# Patient Record
Sex: FEMALE | Race: AMERICAN INDIAN OR ALASKA NATIVE | NOT HISPANIC OR LATINO | Employment: OTHER | URBAN - NONMETROPOLITAN AREA
[De-identification: names, ages, dates, MRNs, and addresses within clinical notes are randomized per-mention and may not be internally consistent; named-entity substitution may affect disease eponyms.]

---

## 2017-02-02 ENCOUNTER — OFFICE VISIT (OUTPATIENT)
Dept: PSYCHOLOGY | Facility: OTHER | Age: 62
End: 2017-02-02
Attending: SOCIAL WORKER
Payer: COMMERCIAL

## 2017-02-02 DIAGNOSIS — F41.1 GENERALIZED ANXIETY DISORDER: ICD-10-CM

## 2017-02-02 DIAGNOSIS — F34.1 PERSISTENT DEPRESSIVE DISORDER WITH ANXIOUS DISTRESS, CURRENTLY MILD: Primary | ICD-10-CM

## 2017-02-02 PROCEDURE — 90791 PSYCH DIAGNOSTIC EVALUATION: CPT | Performed by: SOCIAL WORKER

## 2017-02-02 ASSESSMENT — PATIENT HEALTH QUESTIONNAIRE - PHQ9: 5. POOR APPETITE OR OVEREATING: SEVERAL DAYS

## 2017-02-02 ASSESSMENT — ANXIETY QUESTIONNAIRES
6. BECOMING EASILY ANNOYED OR IRRITABLE: MORE THAN HALF THE DAYS
5. BEING SO RESTLESS THAT IT IS HARD TO SIT STILL: NOT AT ALL
2. NOT BEING ABLE TO STOP OR CONTROL WORRYING: SEVERAL DAYS
1. FEELING NERVOUS, ANXIOUS, OR ON EDGE: SEVERAL DAYS
3. WORRYING TOO MUCH ABOUT DIFFERENT THINGS: MORE THAN HALF THE DAYS
GAD7 TOTAL SCORE: 7
7. FEELING AFRAID AS IF SOMETHING AWFUL MIGHT HAPPEN: NOT AT ALL

## 2017-02-02 NOTE — MR AVS SNAPSHOT
"              After Visit Summary   2/2/2017    Lisa Angeles    MRN: [id number]           Patient Information     Date Of Birth          1955        Visit Information        Provider Department      2/2/2017 10:00 AM [facility]        Today's Diagnoses     Persistent depressive disorder with anxious distress, currently mild    -  1    Generalized anxiety disorder           Follow-ups after your visit        Your next 10 appointments already scheduled     Feb 15, 2017  9:00 AM CST   (Arrive by 8:45 AM)   Return Visit with Kandace [facility]   [facility] ([facility]    [address] [place], [place] [address] [address]   [phone]            Mar 02, 2017  2:30 PM CST   (Arrive by 2:15 PM)   Return Visit with Kandace Trina [facility] ([facility])    [address] [place] [id number]   [phone]              Who to contact     If you have questions or need follow up information about today's clinic visit or your schedule please contact [facility] directly at [phone].  Normal or non-critical lab and imaging results will be communicated to you by MyChart, letter or phone within 4 business days after the clinic has received the results. If you do not hear from us within 7 days, please contact the clinic through [facility] or phone. If you have a critical or abnormal lab result, we will notify you by phone as soon as possible.  Submit refill requests through [employer] or call your pharmacy and they will forward the refill request to us. Please allow 3 business days for your refill to be completed.          Additional Information About Your Visit        MyChart Information     [employer] lets you send messages to your doctor, view your test results, renew your prescriptions, schedule appointments and more. To sign up, go to [email or web address] . Click on \"Log in\" on the left side of the " "screen, which will take you to the Welcome page. Then click on \"Sign up Now\" on the right side of the page.     You will be asked to enter the access code listed below, as well as some personal information. Please follow the directions to create your username and password.     Your access code is: [email or web address]  Expires: 2017 11:59 AM     Your access code will  in 90 days. If you need help or a new code, please call your [facility] clinic or [phone].        Care EveryWhere ID     This is your Care EveryWhere ID. This could be used by other organizations to access your [facility] medical records  [id number]         Blood Pressure from Last 3 Encounters:   17 118/62   16 116/74   16 112/62    Weight from Last 3 Encounters:   17 154 lb (69.9 kg)   16 150 lb (68 kg)   16 155 lb (70.3 kg)              Today, you had the following     No orders found for display       Primary Care Provider Office Phone # Fax #    Carolyn Ritter [phone]       [address] [address]        Thank you!     Thank you for choosing [facility]  for your care. Our goal is always to provide you with excellent care. Hearing back from our patients is one way we can continue to improve our services. Please take a few minutes to complete the written survey that you may receive in the mail after your visit with us. Thank you!             Your Updated Medication List - Protect others around you: Learn how to safely use, store and throw away your medicines at www.disposemymeds.org.          This list is accurate as of: 17 11:59 PM.  Always use your most recent med list.                   Brand Name Dispense Instructions for use    acetaminophen 500 MG tablet    ACETAMINOPHEN EXTRA STRENGTH    90 tablet    Take 2 tablets (1,000 mg) by mouth every 8 hours as needed for pain or fever       B Complex-C Caps     100 capsule    " Take 1 capsule by mouth daily       calcium carbonate 500 MG chewable tablet    TUMS    180 tablet    Take 1 tablet (500 mg) by mouth 2 times daily       diazepam 5 MG tablet    VALIUM    40 tablet    Take 1-2 tablets (5-10 mg) by mouth every 6 hours as needed for muscle spasms (MUSCLE SPASM)       estrogens (conjugated) 0.3 MG tablet    PREMARIN    90 tablet    Take 1 tablet (0.3 mg) by mouth daily       HYDROcodone-acetaminophen 7.5-325 MG per tablet    NORCO    60 tablet    Take 1 tablet by mouth every 6 hours as needed for moderate to severe pain       ibuprofen 800 MG tablet    ADVIL/MOTRIN    90 tablet    Take 1 tablet (800 mg) by mouth every 8 hours as needed       Omega-3 Fish Oil 1000 MG Caps     90 capsule    Take 3 capsules (3,000 mg) by mouth daily       progesterone 100 MG capsule    PROMETRIUM    90 capsule    Take 1 capsule (100 mg) by mouth daily       VITAMIN D3 PO      Take 1,000 Units by mouth daily

## 2017-02-13 ENCOUNTER — OFFICE VISIT (OUTPATIENT)
Dept: FAMILY MEDICINE | Facility: OTHER | Age: 62
End: 2017-02-13
Attending: FAMILY MEDICINE
Payer: COMMERCIAL

## 2017-02-13 VITALS
WEIGHT: 154 LBS | BODY MASS INDEX: 28.34 KG/M2 | RESPIRATION RATE: 14 BRPM | HEART RATE: 66 BPM | HEIGHT: 62 IN | DIASTOLIC BLOOD PRESSURE: 62 MMHG | TEMPERATURE: 96.4 F | SYSTOLIC BLOOD PRESSURE: 118 MMHG

## 2017-02-13 DIAGNOSIS — M65.4 RADIAL STYLOID TENOSYNOVITIS: Primary | ICD-10-CM

## 2017-02-13 PROCEDURE — 99214 OFFICE O/P EST MOD 30 MIN: CPT | Performed by: FAMILY MEDICINE

## 2017-02-13 NOTE — MR AVS SNAPSHOT
"              After Visit Summary   2/13/2017    Lisa Angeles    MRN: [id number]           Patient Information     Date Of Birth          1955        Visit Information        Provider Department      2/13/2017 10:00 AM Carolyn Ritter MD [facility]        Today's Diagnoses     Radial styloid tenosynovitis    -  1       Follow-ups after your visit        Follow-up notes from your care team     Return if symptoms worsen or fail to improve.      Your next 10 appointments already scheduled     Feb 15, 2017  9:00 AM CST   (Arrive by 8:45 AM)   Return Visit with Kandace [facility]   [facility]    [address] [place], [place] [address] [place] [id number] [phone]            Mar 02, 2017  2:30 PM CST   (Arrive by 2:15 PM)   Return Visit with Kandace Andrew [facility]   [facility]    [address] [place] [id number] [phone]              Who to contact     If you have questions or need follow up information about today's clinic visit or your schedule please contact [facility] directly at [phone].  Normal or non-critical lab and imaging results will be communicated to you by MyChart, letter or phone within 4 business days after the clinic has received the results. If you do not hear from us within 7 days, please contact the clinic through MyChart or phone. If you have a critical or abnormal lab result, we will notify you by phone as soon as possible.  Submit refill requests through [employer] or call your pharmacy and they will forward the refill request to us. Please allow 3 business days for your refill to be completed.          Additional Information About Your Visit        MyChart Information     [employer] lets you send messages to your doctor, view your test results, renew your prescriptions, schedule appointments and more. To sign up, go to [email or web address] . Click on \"Log in\" on the left " "side of the screen, which will take you to the Welcome page. Then click on \"Sign up Now\" on the right side of the page.     You will be asked to enter the access code listed below, as well as some personal information. Please follow the directions to create your username and password.     Your access code is: [email or web address]  Expires: 2017 11:59 AM     Your access code will  in 90 days. If you need help or a new code, please call your [facility]] or [phone].        Care EveryWhere ID     This is your Care EveryWhere ID. This could be used by other organizations to access your [facility] medical records  [id number]        Your Vitals Were     Pulse Temperature Respirations Height BMI (Body Mass Index)       66 96.4  F (35.8  C) (Tympanic) 14 5' 2\" (1.575 m) 28.17 kg/m2        Blood Pressure from Last 3 Encounters:   17 118/62   16 116/74   16 112/62    Weight from Last 3 Encounters:   17 154 lb (69.9 kg)   16 150 lb (68 kg)   16 155 lb (70.3 kg)              Today, you had the following     No orders found for display         Today's Medication Changes          These changes are accurate as of: 17 11:59 AM.  If you have any questions, ask your nurse or doctor.               Start taking these medicines.        Dose/Directions    order for DME   Used for:  Radial styloid tenosynovitis   Started by:  Carolyn Ritter MD        Equipment being ordered: gamekeeper/thumb spica splint   Quantity:  1 Device   Refills:  0            Where to get your medicines      Some of these will need a paper prescription and others can be bought over the counter.  Ask your nurse if you have questions.     Bring a paper prescription for each of these medications     order for DME                Primary Care Provider Office Phone # Fax #    Carolyn Ritter [phone]       [place] [address]        Thank " you!     Thank you for choosing [facility]  for your care. Our goal is always to provide you with excellent care. Hearing back from our patients is one way we can continue to improve our services. Please take a few minutes to complete the written survey that you may receive in the mail after your visit with us. Thank you!             Your Updated Medication List - Protect others around you: Learn how to safely use, store and throw away your medicines at www.disposemymeds.org.          This list is accurate as of: 2/13/17 11:59 AM.  Always use your most recent med list.                   Brand Name Dispense Instructions for use    acetaminophen 500 MG tablet    ACETAMINOPHEN EXTRA STRENGTH    90 tablet    Take 2 tablets (1,000 mg) by mouth every 8 hours as needed for pain or fever       B Complex-C Caps     100 capsule    Take 1 capsule by mouth daily       calcium carbonate 500 MG chewable tablet    TUMS    180 tablet    Take 1 tablet (500 mg) by mouth 2 times daily       diazepam 5 MG tablet    VALIUM    40 tablet    Take 1-2 tablets (5-10 mg) by mouth every 6 hours as needed for muscle spasms (MUSCLE SPASM)       estrogens (conjugated) 0.3 MG tablet    PREMARIN    90 tablet    Take 1 tablet (0.3 mg) by mouth daily       HYDROcodone-acetaminophen 7.5-325 MG per tablet    NORCO    60 tablet    Take 1 tablet by mouth every 6 hours as needed for moderate to severe pain       ibuprofen 800 MG tablet    ADVIL/MOTRIN    90 tablet    Take 1 tablet (800 mg) by mouth every 8 hours as needed       Omega-3 Fish Oil 1000 MG Caps     90 capsule    Take 3 capsules (3,000 mg) by mouth daily       order for DME     1 Device    Equipment being ordered: gamekeeper/thumb spica splint       progesterone 100 MG capsule    PROMETRIUM    90 capsule    Take 1 capsule (100 mg) by mouth daily       VITAMIN D3 PO      Take 1,000 Units by mouth daily

## 2017-02-13 NOTE — NURSING NOTE
"Chief Complaint   Patient presents with     Musculoskeletal Problem     Patient reports right wrist pain. Denies injury, ongoing and worsening       Initial /62 (BP Location: Right arm, Patient Position: Chair, Cuff Size: Adult Regular)  Pulse 66  Temp 96.4  F (35.8  C) (Tympanic)  Resp 14  Ht 5' 2\" (1.575 m)  Wt 154 lb (69.9 kg)  BMI 28.17 kg/m2 Estimated body mass index is 28.17 kg/(m^2) as calculated from the following:    Height as of this encounter: 5' 2\" (1.575 m).    Weight as of this encounter: 154 lb (69.9 kg).  Medication Reconciliation: complete   Agatha Montgomery      "
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
## 2017-02-15 ENCOUNTER — OFFICE VISIT (OUTPATIENT)
Dept: PSYCHOLOGY | Facility: OTHER | Age: 62
End: 2017-02-15
Attending: SOCIAL WORKER
Payer: COMMERCIAL

## 2017-02-15 DIAGNOSIS — F34.1 PERSISTENT DEPRESSIVE DISORDER WITH ANXIOUS DISTRESS, CURRENTLY MILD: Primary | ICD-10-CM

## 2017-02-15 DIAGNOSIS — F41.1 GENERALIZED ANXIETY DISORDER: ICD-10-CM

## 2017-02-15 PROCEDURE — 90837 PSYTX W PT 60 MINUTES: CPT | Performed by: SOCIAL WORKER

## 2017-02-15 ASSESSMENT — ANXIETY QUESTIONNAIRES
IF YOU CHECKED OFF ANY PROBLEMS ON THIS QUESTIONNAIRE, HOW DIFFICULT HAVE THESE PROBLEMS MADE IT FOR YOU TO DO YOUR WORK, TAKE CARE OF THINGS AT HOME, OR GET ALONG WITH OTHER PEOPLE: SOMEWHAT DIFFICULT
5. BEING SO RESTLESS THAT IT IS HARD TO SIT STILL: NOT AT ALL
3. WORRYING TOO MUCH ABOUT DIFFERENT THINGS: MORE THAN HALF THE DAYS
6. BECOMING EASILY ANNOYED OR IRRITABLE: MORE THAN HALF THE DAYS
7. FEELING AFRAID AS IF SOMETHING AWFUL MIGHT HAPPEN: NOT AT ALL
1. FEELING NERVOUS, ANXIOUS, OR ON EDGE: SEVERAL DAYS
GAD7 TOTAL SCORE: 8
GAD7 TOTAL SCORE: 7
2. NOT BEING ABLE TO STOP OR CONTROL WORRYING: MORE THAN HALF THE DAYS

## 2017-02-15 ASSESSMENT — PATIENT HEALTH QUESTIONNAIRE - PHQ9
SUM OF ALL RESPONSES TO PHQ QUESTIONS 1-9: 9
5. POOR APPETITE OR OVEREATING: SEVERAL DAYS

## 2017-02-15 NOTE — MR AVS SNAPSHOT
"              After Visit Summary   2/15/2017    Lisa Angeles    MRN: [id number]           Patient Information     Date Of Birth          1955        Visit Information        Provider Department      2/15/2017 9:00 AM [facility]        Today's Diagnoses     Persistent depressive disorder with anxious distress, currently mild    -  1    Generalized anxiety disorder           Follow-ups after your visit        Your next 10 appointments already scheduled     Mar 02, 2017  2:30 PM CST   (Arrive by 2:15 PM)   Return Visit with Kandace Mena [facility]   [facility] ([facility]    [address] [place], [place] [address] [place] [address]   [phone]            Mar 15, 2017 11:00 AM CDT   (Arrive by 10:45 AM)   Return Visit with Kandace Trina [facility]   [facility] ([facility])    [address] [place], [place] [address] [place] [address] [phone]              Who to contact     If you have questions or need follow up information about today's clinic visit or your schedule please contact [facility] directly at [phone].  Normal or non-critical lab and imaging results will be communicated to you by MyChart, letter or phone within 4 business days after the clinic has received the results. If you do not hear from us within 7 days, please contact the clinic through [employer] or phone. If you have a critical or abnormal lab result, we will notify you by phone as soon as possible.  Submit refill requests through R&L or call your pharmacy and they will forward the refill request to us. Please allow 3 business days for your refill to be completed.          Additional Information About Your Visit        MyChart Information     R&L lets you send messages to your doctor, view your test results, renew your prescriptions, schedule appointments and more. To sign up, go to [email or web address] . Click on \"Log in\" on the left side of the " "screen, which will take you to the Welcome page. Then click on \"Sign up Now\" on the right side of the page.     You will be asked to enter the access code listed below, as well as some personal information. Please follow the directions to create your username and password.     Your access code is: [email or web address]  Expires: 2017 11:59 AM     Your access code will  in 90 days. If you need help or a new code, please call your [facility] clinic or [phone].        Care EveryWhere ID     This is your Care EveryWhere ID. This could be used by other organizations to access your [facility] medical records  [id number]         Blood Pressure from Last 3 Encounters:   17 118/62   16 116/74   16 112/62    Weight from Last 3 Encounters:   17 154 lb (69.9 kg)   16 150 lb (68 kg)   16 155 lb (70.3 kg)              Today, you had the following     No orders found for display       Primary Care Provider Office Phone # Fax #    Carolyn Ritter [phone]       [address] [address]        Thank you!     Thank you for choosing [facility]  for your care. Our goal is always to provide you with excellent care. Hearing back from our patients is one way we can continue to improve our services. Please take a few minutes to complete the written survey that you may receive in the mail after your visit with us. Thank you!             Your Updated Medication List - Protect others around you: Learn how to safely use, store and throw away your medicines at www.disposemymeds.org.          This list is accurate as of: 2/15/17 11:59 PM.  Always use your most recent med list.                   Brand Name Dispense Instructions for use    acetaminophen 500 MG tablet    ACETAMINOPHEN EXTRA STRENGTH    90 tablet    Take 2 tablets (1,000 mg) by mouth every 8 hours as needed for pain or fever       B Complex-C Caps     100 capsule "    Take 1 capsule by mouth daily       calcium carbonate 500 MG chewable tablet    TUMS    180 tablet    Take 1 tablet (500 mg) by mouth 2 times daily       diazepam 5 MG tablet    VALIUM    40 tablet    Take 1-2 tablets (5-10 mg) by mouth every 6 hours as needed for muscle spasms (MUSCLE SPASM)       estrogens (conjugated) 0.3 MG tablet    PREMARIN    90 tablet    Take 1 tablet (0.3 mg) by mouth daily       HYDROcodone-acetaminophen 7.5-325 MG per tablet    NORCO    60 tablet    Take 1 tablet by mouth every 6 hours as needed for moderate to severe pain       ibuprofen 800 MG tablet    ADVIL/MOTRIN    90 tablet    Take 1 tablet (800 mg) by mouth every 8 hours as needed       Omega-3 Fish Oil 1000 MG Caps     90 capsule    Take 3 capsules (3,000 mg) by mouth daily       order for DME     1 Device    Equipment being ordered: gamekeeper/thumb spica splint       progesterone 100 MG capsule    PROMETRIUM    90 capsule    Take 1 capsule (100 mg) by mouth daily       VITAMIN D3 PO      Take 1,000 Units by mouth daily

## 2017-02-23 NOTE — PROGRESS NOTES
Progress Note    Client Name: Lisa Angeles  Date: February 15, 2017         Service Type: Individual      Session Start Time: 8:55  Session End Time: 9:50      Session Length: 55 minutes     Session #: 1     Attendees: Client attended alone     DSM-V Diagnoses:   300.4 Persistent Depressive Disorder, Late onset  300.02 (F41.1) Generalized Anxiety Disorder (provisional)  DA Date: 2/2/17    Treatment Plan Due: 5/15/17  PHQ-9 :   PHQ-9 SCORE 12/9/2016 2/2/2017 2/15/2017   Total Score - - -   Total Score 6 9 7      DAYTON-7 :    DAYTON-7 SCORE 12/9/2016 2/2/2017 2/15/2017   Total Score 4 7 8           DATA      Progress Since Last Session (Related to Symptoms / Goals / Homework):   Symptoms: same    Homework: Completed in session     Current / Ongoing Stressors and Concerns:   Lisa explained that she continues to struggle with ongoing back issues and pain. She reports that this pain causes her more depressive symptoms. Lisa also discussed her worry/anxiety thoughts and how they have been affecting her.      Treatment Objective(s) Addressed in This Session:   Objective A (Client Action)                  Client will learn 5 CBT skills to use for reducing depressed mood.     Intervention:   Provided psycho-education utilizing CBT, cognitive restructuring and container exercise.     Response to Interventions:   Lisa stated that she understood this information.     ASSESSMENT: Current Emotional / Mental Status (status of significant symptoms):   Risk status (Self / Other harm or suicidal ideation)   Client denies current fears or concerns for personal safety.   Client denies current or recent suicidal ideation or behaviors.   Client denies current or recent homicidal ideation or behaviors.   Client denies current or recent self injurious behavior or ideation.   Client denies other safety concerns.   A safety and risk management plan has not been developed at this time,  however client was given the after-hours number / 911 should there be a change in any of these risk factors.     Appearance:   Appropriate    Eye Contact:   Good    Psychomotor Behavior: Normal    Attitude:   Cooperative    Orientation:   All   Speech    Rate / Production: Normal     Volume:  Normal    Mood:    Anxious  Depressed    Affect:    Expansive  Worrisome    Thought Content:  Clear    Thought Form:  Coherent  Circumstantial   Insight:    Fair         Medication Compliance:   Yes     Changes in Health Issues:   None reported     Chemical Use Review:   Substance Use: Chemical use reviewed, no active concerns identified      Tobacco Use: No current tobacco use.       Collateral Reports Completed:   Not Applicable    PLAN: (Client Tasks / Therapist Tasks / Other)  Lisa was asked to work on these skills at home and to return for follow up appointments.    VILLA EduardoSW

## 2017-02-24 ASSESSMENT — PATIENT HEALTH QUESTIONNAIRE - PHQ9: SUM OF ALL RESPONSES TO PHQ QUESTIONS 1-9: 7

## 2017-02-24 ASSESSMENT — ANXIETY QUESTIONNAIRES: GAD7 TOTAL SCORE: 8

## 2017-03-02 ENCOUNTER — OFFICE VISIT (OUTPATIENT)
Dept: PSYCHOLOGY | Facility: OTHER | Age: 62
End: 2017-03-02
Attending: SOCIAL WORKER
Payer: COMMERCIAL

## 2017-03-02 DIAGNOSIS — F34.1 PERSISTENT DEPRESSIVE DISORDER WITH ANXIOUS DISTRESS, CURRENTLY MILD: Primary | ICD-10-CM

## 2017-03-02 DIAGNOSIS — F41.1 GENERALIZED ANXIETY DISORDER: ICD-10-CM

## 2017-03-02 PROCEDURE — 90837 PSYTX W PT 60 MINUTES: CPT | Performed by: SOCIAL WORKER

## 2017-03-02 ASSESSMENT — ANXIETY QUESTIONNAIRES
5. BEING SO RESTLESS THAT IT IS HARD TO SIT STILL: NOT AT ALL
7. FEELING AFRAID AS IF SOMETHING AWFUL MIGHT HAPPEN: NOT AT ALL
1. FEELING NERVOUS, ANXIOUS, OR ON EDGE: MORE THAN HALF THE DAYS
3. WORRYING TOO MUCH ABOUT DIFFERENT THINGS: SEVERAL DAYS
2. NOT BEING ABLE TO STOP OR CONTROL WORRYING: SEVERAL DAYS
IF YOU CHECKED OFF ANY PROBLEMS ON THIS QUESTIONNAIRE, HOW DIFFICULT HAVE THESE PROBLEMS MADE IT FOR YOU TO DO YOUR WORK, TAKE CARE OF THINGS AT HOME, OR GET ALONG WITH OTHER PEOPLE: SOMEWHAT DIFFICULT
GAD7 TOTAL SCORE: 6
6. BECOMING EASILY ANNOYED OR IRRITABLE: MORE THAN HALF THE DAYS

## 2017-03-02 ASSESSMENT — PATIENT HEALTH QUESTIONNAIRE - PHQ9: 5. POOR APPETITE OR OVEREATING: NOT AT ALL

## 2017-03-03 NOTE — PROGRESS NOTES
Progress Note    Client Name: Lisa Angeles  Date: March 2, 2017         Service Type: Individual      Session Start Time: 8:18  Session End Time: 3:15      Session Length: 57 minutes     Session #: 2     Attendees: Client attended alone     DSM-V Diagnoses:   300.4 Persistent Depressive Disorder, Late onset  300.02 (F41.1) Generalized Anxiety Disorder (provisional)  DA Date: 2/2/17    Treatment Plan Due: 5/15/17  PHQ-9 :   PHQ-9 SCORE 2/2/2017 2/15/2017 3/2/2017   Total Score - - -   Total Score 9 7 6      DAYTON-7 :    DAYTON-7 SCORE 2/2/2017 2/15/2017 3/2/2017   Total Score 7 8 6           DATA      Progress Since Last Session (Related to Symptoms / Goals / Homework):   Symptoms: same    Homework: Completed in session     Current / Ongoing Stressors and Concerns:   Lisa discussed increased symptoms of anxiety and how she has been feeling on edge. She reports that her back pain continues to be bad and explains how this negatively affects her mood, anxiety, and interactions with others.      Treatment Objective(s) Addressed in This Session:   Objective 1A (Client Action)                  Client will learn 5 CBT skills to use for reducing depressed mood.  Objective 1C  Client will learn and use mindfulness skills 5 days out of 7.     Intervention:   Provided psycho-education utilizing CBT, and mindfulness skills. Also worked with Lisa on social security papers.      Response to Interventions:   Lisa stated that she understood this information.     ASSESSMENT: Current Emotional / Mental Status (status of significant symptoms):   Risk status (Self / Other harm or suicidal ideation)   Client denies current fears or concerns for personal safety.   Client denies current or recent suicidal ideation or behaviors.   Client denies current or recent homicidal ideation or behaviors.   Client denies current or recent self injurious behavior or ideation.   Client denies other  safety concerns.   A safety and risk management plan has not been developed at this time, however client was given the after-hours number / 911 should there be a change in any of these risk factors.     Appearance:   Appropriate    Eye Contact:   Good    Psychomotor Behavior: Normal    Attitude:   Cooperative    Orientation:   All   Speech    Rate / Production: Normal     Volume:  Normal    Mood:    Anxious  Depressed    Affect:    Expansive  Worrisome    Thought Content:  Clear    Thought Form:  Coherent  Circumstantial   Insight:    Fair         Medication Compliance:   Yes     Changes in Health Issues:   None reported     Chemical Use Review:   Substance Use: Chemical use reviewed, no active concerns identified      Tobacco Use: No current tobacco use.       Collateral Reports Completed:   Not Applicable    PLAN: (Client Tasks / Therapist Tasks / Other)  Lisa was asked to work on these skills at home and to return for follow up appointments.    VILLA EduardoSW

## 2017-03-04 ASSESSMENT — ANXIETY QUESTIONNAIRES: GAD7 TOTAL SCORE: 6

## 2017-03-04 ASSESSMENT — PATIENT HEALTH QUESTIONNAIRE - PHQ9: SUM OF ALL RESPONSES TO PHQ QUESTIONS 1-9: 6

## 2017-03-07 DIAGNOSIS — G89.29 CHRONIC BILATERAL LOW BACK PAIN WITH RIGHT-SIDED SCIATICA: ICD-10-CM

## 2017-03-07 DIAGNOSIS — M54.41 CHRONIC BILATERAL LOW BACK PAIN WITH RIGHT-SIDED SCIATICA: ICD-10-CM

## 2017-03-07 DIAGNOSIS — M54.41 BILATERAL LOW BACK PAIN WITH RIGHT-SIDED SCIATICA, UNSPECIFIED CHRONICITY: ICD-10-CM

## 2017-03-07 RX ORDER — DIAZEPAM 5 MG
5-10 TABLET ORAL EVERY 6 HOURS PRN
Qty: 40 TABLET | Refills: 0 | Status: SHIPPED | OUTPATIENT
Start: 2017-03-07 | End: 2017-07-28

## 2017-03-07 RX ORDER — HYDROCODONE BITARTRATE AND ACETAMINOPHEN 7.5; 325 MG/1; MG/1
1 TABLET ORAL EVERY 6 HOURS PRN
Qty: 60 TABLET | Refills: 0 | Status: SHIPPED | OUTPATIENT
Start: 2017-03-07 | End: 2017-10-12

## 2017-03-07 NOTE — TELEPHONE ENCOUNTER
Notified via message that  Script for requested medication is ready to be picked up at the  of the [facility]

## 2017-03-07 NOTE — TELEPHONE ENCOUNTER
Norco last filled on 10.11.16,# 60. Diazepam last filled on 9.30.16,# 40 with 1 refill.Last office visit on 2.13.17.Medications pended for you at this time.Thank you.

## 2017-03-07 NOTE — TELEPHONE ENCOUNTER
Reason for call:  Medication    1. Medication Name?diazepam 5 mgms,Hydrocodone acetaminaphen 7.5/ 325  2. Is this request for a refill?yes  3. What Pharmacy do you use? Needs to  [facility]  Have you contacted your pharmacy? no(Please note that the turn-around-time for prescriptions is 72 business hours; I am sending your request at this time. SEND TO  [facility]  )  Description:work comp prescription refill Was an appointment offered for this a call? No  Preferred method for responding to this message self  If we cannot reach you directly, may we leave a detailed response at the number you provided?yes  Can this message wait until your PCP/Provider returns if not available today? no

## 2017-03-20 ENCOUNTER — TELEPHONE (OUTPATIENT)
Dept: FAMILY MEDICINE | Facility: OTHER | Age: 62
End: 2017-03-20

## 2017-03-20 NOTE — TELEPHONE ENCOUNTER
3:24 PM    Reason for Call: OVERBOOK    Patient is having the following symptoms: Lump in neck for 2 weeks. Aggravated and sore when touched.    The patient is requesting an appointment for future overbook with [facility].    Was an appointment offered for this call? Yes 04/17 - patient declined    Preferred method for responding to this message: Telephone Call    If we cannot reach you directly, may we leave a detailed response at the number you provided? Yes    Can this message wait until your PCP/provider returns, if unavailable today? Not applicable,     Nayana Mericle

## 2017-03-23 ENCOUNTER — OFFICE VISIT (OUTPATIENT)
Dept: FAMILY MEDICINE | Facility: OTHER | Age: 62
End: 2017-03-23
Attending: FAMILY MEDICINE
Payer: COMMERCIAL

## 2017-03-23 VITALS
WEIGHT: 155 LBS | HEIGHT: 65 IN | BODY MASS INDEX: 25.83 KG/M2 | HEART RATE: 72 BPM | TEMPERATURE: 97.4 F | DIASTOLIC BLOOD PRESSURE: 62 MMHG | SYSTOLIC BLOOD PRESSURE: 114 MMHG

## 2017-03-23 DIAGNOSIS — M79.12 STERNOCLEIDOMASTOID MUSCLE TENDERNESS: Primary | ICD-10-CM

## 2017-03-23 PROCEDURE — 99213 OFFICE O/P EST LOW 20 MIN: CPT | Performed by: FAMILY MEDICINE
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
## 2017-03-23 NOTE — MR AVS SNAPSHOT
"              After Visit Summary   3/23/2017    Lisa Angeles    MRN: [id number]           Patient Information     Date Of Birth          1955        Visit Information        Provider Department      3/23/2017 8:45 AM Carolyn Ritter MD [facility]        Today's Diagnoses     Sternocleidomastoid muscle tenderness    -  1       Follow-ups after your visit        Follow-up notes from your care team     Return if symptoms worsen or fail to improve.      Your next 10 appointments already scheduled     Apr 12, 2017 11:00 AM CDT   (Arrive by 10:45 AM)   Return Visit with Kandace Andrew [facility]   [facility] [place] [facility]    [address] [place] [address]  [place] [address] [phone]              Who to contact     If you have questions or need follow up information about today's clinic visit or your schedule please contact [facility] directly at [phone].  Normal or non-critical lab and imaging results will be communicated to you by MyChart, letter or phone within 4 business days after the clinic has received the results. If you do not hear from us within 7 days, please contact the clinic through [employer] or phone. If you have a critical or abnormal lab result, we will notify you by phone as soon as possible.  Submit refill requests through [employer] or call your pharmacy and they will forward the refill request to us. Please allow 3 business days for your refill to be completed.          Additional Information About Your Visit        [employer] Information     [employer] lets you send messages to your doctor, view your test results, renew your prescriptions, schedule appointments and more. To sign up, go to [email or web address] . Click on \"Log in\" on the left side of the screen, which will take you to the Welcome page. Then click on \"Sign up Now\" on the right side of the page.     You will be asked to enter the access code listed below, as well as some " "personal information. Please follow the directions to create your username and password.     Your access code is: [email or web address]  Expires: 2017 12:59 PM     Your access code will  in 90 days. If you need help or a new code, please call your [facility] [place] clinic or [phone].        Care EveryWhere ID     This is your Care EveryWhere ID. This could be used by other organizations to access your [place] medical records  [id number]        Your Vitals Were     Pulse Temperature Height BMI (Body Mass Index)          72 97.4  F (36.3  C) (Tympanic) 5' 5\" (1.651 m) 25.79 kg/m2         Blood Pressure from Last 3 Encounters:   17 114/62   17 118/62   16 116/74    Weight from Last 3 Encounters:   17 155 lb (70.3 kg)   17 154 lb (69.9 kg)   16 150 lb (68 kg)              Today, you had the following     No orders found for display       Primary Care Provider Office Phone # Fax #    Carolyn Ritter [phone]       [facility] [address] [place] [address]        Thank you!     Thank you for choosing [facility]  for your care. Our goal is always to provide you with excellent care. Hearing back from our patients is one way we can continue to improve our services. Please take a few minutes to complete the written survey that you may receive in the mail after your visit with us. Thank you!             Your Updated Medication List - Protect others around you: Learn how to safely use, store and throw away your medicines at www.disposemymeds.org.          This list is accurate as of: 3/23/17 11:17 AM.  Always use your most recent med list.                   Brand Name Dispense Instructions for use    acetaminophen 500 MG tablet    ACETAMINOPHEN EXTRA STRENGTH    90 tablet    Take 2 tablets (1,000 mg) by mouth every 8 hours as needed for pain or fever       B Complex-C Caps     100 capsule    Take 1 capsule by mouth daily       " calcium carbonate 500 MG chewable tablet    TUMS    180 tablet    Take 1 tablet (500 mg) by mouth 2 times daily       diazepam 5 MG tablet    VALIUM    40 tablet    Take 1-2 tablets (5-10 mg) by mouth every 6 hours as needed for muscle spasms (MUSCLE SPASM)       estrogens (conjugated) 0.3 MG tablet    PREMARIN    90 tablet    Take 1 tablet (0.3 mg) by mouth daily       HYDROcodone-acetaminophen 7.5-325 MG per tablet    NORCO    60 tablet    Take 1 tablet by mouth every 6 hours as needed for moderate to severe pain       ibuprofen 800 MG tablet    ADVIL/MOTRIN    90 tablet    Take 1 tablet (800 mg) by mouth every 8 hours as needed       Omega-3 Fish Oil 1000 MG Caps     90 capsule    Take 3 capsules (3,000 mg) by mouth daily       order for DME     1 Device    Equipment being ordered: gamekeeper/thumb spica splint       progesterone 100 MG capsule    PROMETRIUM    90 capsule    Take 1 capsule (100 mg) by mouth daily       VITAMIN D3 PO      Take 1,000 Units by mouth daily

## 2017-03-23 NOTE — NURSING NOTE
"Chief Complaint   Patient presents with     Mass     Patient has a lump on the right side.       Initial /62 (BP Location: Left arm, Patient Position: Chair, Cuff Size: Adult Regular)  Pulse 72  Temp 97.4  F (36.3  C) (Tympanic)  Ht 5' 5\" (1.651 m)  Wt 155 lb (70.3 kg)  BMI 25.79 kg/m2 Estimated body mass index is 25.79 kg/(m^2) as calculated from the following:    Height as of this encounter: 5' 5\" (1.651 m).    Weight as of this encounter: 155 lb (70.3 kg).  Medication Reconciliation: complete   Agatha Montgomery      "

## 2017-04-06 ENCOUNTER — TELEPHONE (OUTPATIENT)
Dept: FAMILY MEDICINE | Facility: OTHER | Age: 62
End: 2017-04-06

## 2017-04-06 DIAGNOSIS — E78.5 HYPERLIPIDEMIA: ICD-10-CM

## 2017-04-06 RX ORDER — CHLORAL HYDRATE 500 MG
3000 CAPSULE ORAL DAILY
Qty: 90 CAPSULE | Refills: 11 | Status: SHIPPED | OUTPATIENT
Start: 2017-04-06 | End: 2017-04-11

## 2017-04-06 NOTE — TELEPHONE ENCOUNTER
Reason for call:  Medication    1. Medication Name?  Omega 3 Fish oil 1200 mg  2. Is this request for a refill?  Yes  3. What Pharmacy do you use?   [facility]  4. Have you contacted your pharmacy?  No  5. If yes, when?  (Please note that the turn-around-time for prescriptions is 72 business hours; I am sending your request at this time. SEND TO  [facility]  )  Description:  Dr. Ocampo usually takes care of this through her office.  Was an appointment offered for this a call?  No  Preferred method for responding to this message  [phone]  If we cannot reach you directly, may we leave a detailed response at the number you provided?  Yes

## 2017-04-06 NOTE — TELEPHONE ENCOUNTER
"10:46 AM    Reason for Call: Phone Call    Description: patient spoke with \"someone\" regarding an order for fish oil, she wanted me to pass along more info...  As of 04/10/2017 ALL of her medication (under her [facility]) will need to be faxed to the [facility]in [place] @ FAX #[phone].  She is out of the Omega 3/1200mg and would like to be sure the following meds (no immediate refills needed) are sent over thru new fax #: premarin 0.3/progesterone Cap 100mg/Vit D 1000IU/Total B w/vit C    Was an appointment offered for this call? N/A    Preferred method for responding to this message: Telephone Call    If we cannot reach you directly, may we leave a detailed response at the number you provided? Yes    Can this message wait until your PCP/provider returns, if available today? YES    Mami Pop        "

## 2017-04-11 DIAGNOSIS — E78.5 HYPERLIPIDEMIA, UNSPECIFIED HYPERLIPIDEMIA TYPE: ICD-10-CM

## 2017-04-11 RX ORDER — CHLORAL HYDRATE 500 MG
3000 CAPSULE ORAL DAILY
Qty: 90 CAPSULE | Refills: 1 | Status: SHIPPED | OUTPATIENT
Start: 2017-04-11 | End: 2017-04-12

## 2017-04-12 ENCOUNTER — OFFICE VISIT (OUTPATIENT)
Dept: PSYCHOLOGY | Facility: OTHER | Age: 62
End: 2017-04-12
Attending: SOCIAL WORKER
Payer: COMMERCIAL

## 2017-04-12 DIAGNOSIS — F43.21 GRIEF: ICD-10-CM

## 2017-04-12 DIAGNOSIS — F34.1 PERSISTENT DEPRESSIVE DISORDER WITH ANXIOUS DISTRESS, CURRENTLY MILD: Primary | ICD-10-CM

## 2017-04-12 PROCEDURE — 90837 PSYTX W PT 60 MINUTES: CPT | Performed by: SOCIAL WORKER

## 2017-04-12 RX ORDER — CHLORAL HYDRATE 500 MG
3000 CAPSULE ORAL DAILY
Qty: 90 CAPSULE | Refills: 10 | Status: SHIPPED | OUTPATIENT
Start: 2017-04-12 | End: 2018-03-06

## 2017-04-12 ASSESSMENT — ANXIETY QUESTIONNAIRES
GAD7 TOTAL SCORE: 13
2. NOT BEING ABLE TO STOP OR CONTROL WORRYING: NEARLY EVERY DAY
IF YOU CHECKED OFF ANY PROBLEMS ON THIS QUESTIONNAIRE, HOW DIFFICULT HAVE THESE PROBLEMS MADE IT FOR YOU TO DO YOUR WORK, TAKE CARE OF THINGS AT HOME, OR GET ALONG WITH OTHER PEOPLE: SOMEWHAT DIFFICULT
1. FEELING NERVOUS, ANXIOUS, OR ON EDGE: MORE THAN HALF THE DAYS
7. FEELING AFRAID AS IF SOMETHING AWFUL MIGHT HAPPEN: NOT AT ALL
6. BECOMING EASILY ANNOYED OR IRRITABLE: NEARLY EVERY DAY
5. BEING SO RESTLESS THAT IT IS HARD TO SIT STILL: SEVERAL DAYS
3. WORRYING TOO MUCH ABOUT DIFFERENT THINGS: NEARLY EVERY DAY

## 2017-04-12 ASSESSMENT — PATIENT HEALTH QUESTIONNAIRE - PHQ9: 5. POOR APPETITE OR OVEREATING: SEVERAL DAYS

## 2017-04-12 NOTE — MR AVS SNAPSHOT
"              After Visit Summary   4/12/2017    Lisa Angeles    MRN: [id number]           Patient Information     Date Of Birth          1955        Visit Information        Provider Department      4/12/2017 11:00 AM Kandace Andrew [facility]        Today's Diagnoses     Persistent depressive disorder with anxious distress, currently mild    -  1    Grief           Follow-ups after your visit        Your next 10 appointments already scheduled     May 03, 2017 11:00 AM CDT   (Arrive by 10:45 AM)   Return Visit with Kandace Andrew [facility] ([facility])    [address] [place] [id number] [phone]              Who to contact     If you have questions or need follow up information about today's clinic visit or your schedule please contact [facility] directly at [phone].  Normal or non-critical lab and imaging results will be communicated to you by MyChart, letter or phone within 4 business days after the clinic has received the results. If you do not hear from us within 7 days, please contact the clinic through MyChart or phone. If you have a critical or abnormal lab result, we will notify you by phone as soon as possible.  Submit refill requests through [employer] or call your pharmacy and they will forward the refill request to us. Please allow 3 business days for your refill to be completed.          Additional Information About Your Visit        MyChart Information     [employer] lets you send messages to your doctor, view your test results, renew your prescriptions, schedule appointments and more. To sign up, go to [email or web address] . Click on \"Log in\" on the left side of the screen, which will take you to the Welcome page. Then click on \"Sign up Now\" on the right side of the page.     You will be asked to enter the access code listed below, as well as some personal information. Please follow the directions to " create your username and password.     Your access code is: [email or web address]  Expires: 2017 12:59 PM     Your access code will  in 90 days. If you need help or a new code, please call your [facility] or [phone].        Care EveryWhere ID     This is your Care EveryWhere ID. This could be used by other organizations to access your [place] medical records  [id number]         Blood Pressure from Last 3 Encounters:   17 114/62   17 118/62   16 116/74    Weight from Last 3 Encounters:   17 155 lb (70.3 kg)   17 154 lb (69.9 kg)   16 150 lb (68 kg)              Today, you had the following     No orders found for display       Primary Care Provider Office Phone # Fax #    Carolyn Ritter [phone]       [address] [address]        Thank you!     Thank you for choosing [facility]  for your care. Our goal is always to provide you with excellent care. Hearing back from our patients is one way we can continue to improve our services. Please take a few minutes to complete the written survey that you may receive in the mail after your visit with us. Thank you!             Your Updated Medication List - Protect others around you: Learn how to safely use, store and throw away your medicines at www.disposemymeds.org.          This list is accurate as of: 17 11:59 PM.  Always use your most recent med list.                   Brand Name Dispense Instructions for use    acetaminophen 500 MG tablet    ACETAMINOPHEN EXTRA STRENGTH    90 tablet    Take 2 tablets (1,000 mg) by mouth every 8 hours as needed for pain or fever       B Complex-C Caps     100 capsule    Take 1 capsule by mouth daily       calcium carbonate 500 MG chewable tablet    TUMS    180 tablet    Take 1 tablet (500 mg) by mouth 2 times daily       diazepam 5 MG tablet    VALIUM    40 tablet    Take 1-2 tablets (5-10 mg) by mouth  every 6 hours as needed for muscle spasms (MUSCLE SPASM)       estrogens (conjugated) 0.3 MG tablet    PREMARIN    90 tablet    Take 1 tablet (0.3 mg) by mouth daily       HYDROcodone-acetaminophen 7.5-325 MG per tablet    NORCO    60 tablet    Take 1 tablet by mouth every 6 hours as needed for moderate to severe pain       ibuprofen 800 MG tablet    ADVIL/MOTRIN    90 tablet    Take 1 tablet (800 mg) by mouth every 8 hours as needed       Omega-3 Fish Oil 1000 MG Caps     90 capsule    Take 3 capsules (3,000 mg) by mouth daily       order for DME     1 Device    Equipment being ordered: gamekeeper/thumb spica splint       progesterone 100 MG capsule    PROMETRIUM    90 capsule    Take 1 capsule (100 mg) by mouth daily       VITAMIN D3 PO      Take 1,000 Units by mouth daily

## 2017-04-12 NOTE — TELEPHONE ENCOUNTER
Last office visit: 3.23.17 - Patient needs this printed so it can be faxed to her new pharmacy in [place].  Fax [phone]. Thank you  PCP

## 2017-04-14 NOTE — PROGRESS NOTES
Progress Note    Client Name: Lisa Angeles  Date: April 12, 2017         Service Type: Individual      Session Start Time: 11:05  Session End Time: 12:00      Session Length: 55 minutes     Session #: 3     Attendees: Client attended alone     DSM-V Diagnoses:   300.4 Persistent Depressive Disorder, Late onset  300.02 (F41.1) Generalized Anxiety Disorder (provisional)  DA Date: 2/2/17    Treatment Plan Due: 5/15/17  PHQ-9 :   PHQ-9 SCORE 2/2/2017 2/15/2017 3/2/2017   Total Score - - -   Total Score 9 7 6      DAYTON-7 :    DAYTON-7 SCORE 2/15/2017 3/2/2017 4/12/2017   Total Score 8 6 13           DATA      Progress Since Last Session (Related to Symptoms / Goals / Homework):   Symptoms: somewhat worse, however grief over family members passing was prominent.    Homework: Completed in session     Current / Ongoing Stressors and Concerns:   Lisa explained that in less than two weeks time, her aunt, step-daughter, and sister-in-law passed away. She discussed her symptoms of grief and loss and her understanding of the process. Lisa also reported that other family members have not been supportive recently which has also been adding to increased symptoms.      Treatment Objective(s) Addressed in This Session:   Objective 1A (Client Action)                  Client will learn 5 CBT skills to use for reducing depressed mood.  Objective 1C  Client will learn and use mindfulness skills 5 days out of 7.     Intervention:   Provided psycho-education utilizing CBT, and mindfulness skills. Also worked with Lisa on grief and loss.     Response to Interventions:   Lisa stated that she understood this information.     ASSESSMENT: Current Emotional / Mental Status (status of significant symptoms):   Risk status (Self / Other harm or suicidal ideation)   Client denies current fears or concerns for personal safety.   Client denies current or recent suicidal ideation or  behaviors.   Client denies current or recent homicidal ideation or behaviors.   Client denies current or recent self injurious behavior or ideation.   Client denies other safety concerns.   A safety and risk management plan has not been developed at this time, however client was given the after-hours number / 911 should there be a change in any of these risk factors.     Appearance:   Appropriate    Eye Contact:   Good    Psychomotor Behavior: Normal    Attitude:   Cooperative    Orientation:   All   Speech    Rate / Production: Normal     Volume:  Normal    Mood:    Anxious  Depressed  Sad    Affect:    Worrisome  tearful    Thought Content:  Clear    Thought Form:  Coherent  Circumstantial   Insight:    Fair         Medication Compliance:   Yes     Changes in Health Issues:   None reported     Chemical Use Review:   Substance Use: Chemical use reviewed, no active concerns identified      Tobacco Use: No current tobacco use.       Collateral Reports Completed:   Not Applicable    PLAN: (Client Tasks / Therapist Tasks / Other)  Lisa was asked to work on these skills at home and to return for follow up appointments.    Kandace Andrew, VILLASW

## 2017-04-15 ASSESSMENT — ANXIETY QUESTIONNAIRES: GAD7 TOTAL SCORE: 13

## 2017-05-03 ENCOUNTER — OFFICE VISIT (OUTPATIENT)
Dept: PSYCHOLOGY | Facility: OTHER | Age: 62
End: 2017-05-03
Attending: SOCIAL WORKER
Payer: COMMERCIAL

## 2017-05-03 DIAGNOSIS — F34.1 PERSISTENT DEPRESSIVE DISORDER WITH ANXIOUS DISTRESS, CURRENTLY MILD: Primary | ICD-10-CM

## 2017-05-03 PROCEDURE — 90837 PSYTX W PT 60 MINUTES: CPT | Performed by: SOCIAL WORKER

## 2017-05-03 ASSESSMENT — ANXIETY QUESTIONNAIRES
1. FEELING NERVOUS, ANXIOUS, OR ON EDGE: SEVERAL DAYS
5. BEING SO RESTLESS THAT IT IS HARD TO SIT STILL: NOT AT ALL
GAD7 TOTAL SCORE: 7
3. WORRYING TOO MUCH ABOUT DIFFERENT THINGS: MORE THAN HALF THE DAYS
2. NOT BEING ABLE TO STOP OR CONTROL WORRYING: MORE THAN HALF THE DAYS
6. BECOMING EASILY ANNOYED OR IRRITABLE: MORE THAN HALF THE DAYS
IF YOU CHECKED OFF ANY PROBLEMS ON THIS QUESTIONNAIRE, HOW DIFFICULT HAVE THESE PROBLEMS MADE IT FOR YOU TO DO YOUR WORK, TAKE CARE OF THINGS AT HOME, OR GET ALONG WITH OTHER PEOPLE: NOT DIFFICULT AT ALL
7. FEELING AFRAID AS IF SOMETHING AWFUL MIGHT HAPPEN: NOT AT ALL

## 2017-05-03 ASSESSMENT — PATIENT HEALTH QUESTIONNAIRE - PHQ9: 5. POOR APPETITE OR OVEREATING: NOT AT ALL

## 2017-05-03 NOTE — MR AVS SNAPSHOT
"              After Visit Summary   5/3/2017    Lisa Angeles    MRN: [id number]           Patient Information     Date Of Birth          1955        Visit Information        [facility]      [facility] [place] [facility]        Today's Diagnoses     Persistent depressive disorder with anxious distress, currently mild    -  1       Follow-ups after your visit        Your next 10 appointments already scheduled     May 23, 2017 11:00 AM CDT   (Arrive by 10:45 AM)   Return Visit with Kandace Trina Lor[facility] [place] [facility] ([facility])    [address] [place], [place] [address] [id number] [phone]            Jun 13, 2017 11:00 AM CDT   (Arrive by 10:45 AM)   Return Visit with Kandace Trina Lor[facility] [place] [facility] ([facility])    [address] [place] [phone]              Who to contact     If you have questions or need follow up information about today's clinic visit or your schedule please [facility] directly at [phone].  Normal or non-critical lab and imaging results will be communicated to you by F[employer]or[employer], letter or phone within 4 business days after the clinic has received the results. If you do not hear from us within 7 days, please contact the clinic through Fancorpshart or phone. If you have a critical or abnormal lab result, we will notify you by phone as soon as possible.  Submit refill requests through [employer] or call your pharmacy and they will forward the refill request to us. Please allow 3 business days for your refill to be completed.          Additional Information About Your Visit        MyChart Information     [employer] lets you send messages to your doctor, view your test results, renew your prescriptions, schedule appointments and more. To sign up, go to [email or web address] . Click on \"Log in\" on the left side of the screen, which will take you to the " "Welcome page. Then click on \"Sign up Now\" on the right side of the page.     You will be asked to enter the access code listed below, as well as some personal information. Please follow the directions to create your username and password.     Your access code is: [email or web address]  Expires: 2017 11:55 AM     Your access code will  in 90 days. If you need help or a new code, please call your [place] clinic or [phone].        Care EveryWhere ID     This is your Care EveryWhere ID. This could be used by other organizations to access your [facility] medical records  [id number]         Blood Pressure from Last 3 Encounters:   17 114/62   17 118/62   16 116/74    Weight from Last 3 Encounters:   17 155 lb (70.3 kg)   17 154 lb (69.9 kg)   16 150 lb (68 kg)              Today, you had the following     No orders found for display       Primary Care Provider Office Phone # Fax #    Carolyn Ritter [phone]       [address] [address]        Thank you!     Thank you for choosing [facility]  for your care. Our goal is always to provide you with excellent care. Hearing back from our patients is one way we can continue to improve our services. Please take a few minutes to complete the written survey that you may receive in the mail after your visit with us. Thank you!             Your Updated Medication List - Protect others around you: Learn how to safely use, store and throw away your medicines at www.disposemymeds.org.          This list is accurate as of: 5/3/17 11:59 PM.  Always use your most recent med list.                   Brand Name Dispense Instructions for use    acetaminophen 500 MG tablet    ACETAMINOPHEN EXTRA STRENGTH    90 tablet    Take 2 tablets (1,000 mg) by mouth every 8 hours as needed for pain or fever       B Complex-C Caps     100 capsule    Take 1 capsule by mouth daily    "    calcium carbonate 500 MG chewable tablet    TUMS    180 tablet    Take 1 tablet (500 mg) by mouth 2 times daily       diazepam 5 MG tablet    VALIUM    40 tablet    Take 1-2 tablets (5-10 mg) by mouth every 6 hours as needed for muscle spasms (MUSCLE SPASM)       estrogens (conjugated) 0.3 MG tablet    PREMARIN    90 tablet    Take 1 tablet (0.3 mg) by mouth daily       fish oil-omega-3 fatty acids 1000 MG capsule     90 capsule    Take 3 capsules (3,000 mg) by mouth daily       HYDROcodone-acetaminophen 7.5-325 MG per tablet    NORCO    60 tablet    Take 1 tablet by mouth every 6 hours as needed for moderate to severe pain       ibuprofen 800 MG tablet    ADVIL/MOTRIN    90 tablet    Take 1 tablet (800 mg) by mouth every 8 hours as needed       order for DME     1 Device    Equipment being ordered: gamekeeper/thumb spica splint       progesterone 100 MG capsule    PROMETRIUM    90 capsule    Take 1 capsule (100 mg) by mouth daily       VITAMIN D3 PO      Take 1,000 Units by mouth daily

## 2017-05-22 NOTE — PROGRESS NOTES
Progress Note    Client Name: Lisa Angeles  Date: May 3, 2017         Service Type: Individual      Session Start Time: 11:00  Session End Time: 11:58      Session Length: 58 minutes     Session #: 4     Attendees: Client attended alone     DSM-V Diagnoses:   300.4 Persistent Depressive Disorder, Late onset  300.02 (F41.1) Generalized Anxiety Disorder (provisional)  DA Date: 2/2/17    Treatment Plan Due: 5/15/17  PHQ-9 :   PHQ-9 SCORE 2/15/2017 3/2/2017 5/3/2017   Total Score - - -   Total Score 7 6 3      DAYTON-7 :    DAYTON-7 SCORE 3/2/2017 4/12/2017 5/3/2017   Total Score 6 13 7           DATA      Progress Since Last Session (Related to Symptoms / Goals / Homework):  Symptoms: somewhat improving     Homework: Achieved / completed to satisfaction     Current / Ongoing Stressors and Concerns:   Lisa reports that she feels that she is grieving her loved ones in a healthy way. She did explain that she has been trying to incorporate more self care skills. Lisa discussed issues she is experiencing in her relationships and how they can increase her symptoms.      Treatment Objective(s) Addressed in This Session:   Objective 1A (Client Action)                  Client will learn 5 CBT skills to use for reducing depressed mood.     Intervention:   Provided psycho-education utilizing CBT.     Response to Interventions:   Lisa stated that she understood this information.     ASSESSMENT: Current Emotional / Mental Status (status of significant symptoms):   Risk status (Self / Other harm or suicidal ideation)   Client denies current fears or concerns for personal safety.   Client denies current or recent suicidal ideation or behaviors.   Client denies current or recent homicidal ideation or behaviors.   Client denies current or recent self injurious behavior or ideation.   Client denies other safety concerns.   A safety and risk management plan has not been developed at this  time, however client was given the after-hours number / 911 should there be a change in any of these risk factors.     Appearance:   Appropriate    Eye Contact:   Good    Psychomotor Behavior: Normal    Attitude:   Cooperative    Orientation:   All   Speech    Rate / Production: Normal     Volume:  Normal    Mood:    Anxious  Sad    Affect:    Worrisome    Thought Content:  Clear    Thought Form:  Coherent  Circumstantial   Insight:    Fair         Medication Compliance:   Yes     Changes in Health Issues:   None reported     Chemical Use Review:   Substance Use: Chemical use reviewed, no active concerns identified      Tobacco Use: No current tobacco use.       Collateral Reports Completed:   Not Applicable    PLAN: (Client Tasks / Therapist Tasks / Other)  Lisa was asked to work on these skills at home and to return for follow up appointments.    VILLA EduardoSW

## 2017-05-23 ENCOUNTER — OFFICE VISIT (OUTPATIENT)
Dept: PSYCHOLOGY | Facility: OTHER | Age: 62
End: 2017-05-23
Attending: SOCIAL WORKER
Payer: COMMERCIAL

## 2017-05-23 DIAGNOSIS — F34.1 PERSISTENT DEPRESSIVE DISORDER WITH ANXIOUS DISTRESS, CURRENTLY MILD: Primary | ICD-10-CM

## 2017-05-23 PROCEDURE — 90837 PSYTX W PT 60 MINUTES: CPT | Performed by: SOCIAL WORKER

## 2017-05-23 ASSESSMENT — ANXIETY QUESTIONNAIRES
GAD7 TOTAL SCORE: 3
2. NOT BEING ABLE TO STOP OR CONTROL WORRYING: SEVERAL DAYS
GAD7 TOTAL SCORE: 7
7. FEELING AFRAID AS IF SOMETHING AWFUL MIGHT HAPPEN: NOT AT ALL
1. FEELING NERVOUS, ANXIOUS, OR ON EDGE: SEVERAL DAYS
IF YOU CHECKED OFF ANY PROBLEMS ON THIS QUESTIONNAIRE, HOW DIFFICULT HAVE THESE PROBLEMS MADE IT FOR YOU TO DO YOUR WORK, TAKE CARE OF THINGS AT HOME, OR GET ALONG WITH OTHER PEOPLE: NOT DIFFICULT AT ALL
3. WORRYING TOO MUCH ABOUT DIFFERENT THINGS: NOT AT ALL
6. BECOMING EASILY ANNOYED OR IRRITABLE: SEVERAL DAYS
5. BEING SO RESTLESS THAT IT IS HARD TO SIT STILL: NOT AT ALL

## 2017-05-23 ASSESSMENT — PATIENT HEALTH QUESTIONNAIRE - PHQ9
5. POOR APPETITE OR OVEREATING: NOT AT ALL
SUM OF ALL RESPONSES TO PHQ QUESTIONS 1-9: 3

## 2017-05-23 NOTE — MR AVS SNAPSHOT
"              After Visit Summary   5/23/2017    Lisa Angeles    MRN: [id number]           Patient Information     Date Of Birth          1955        Visit Information        Provider Department      5/23/2017 11:00 AM [facility] [place] [facility]        Today's Diagnoses     Persistent depressive disorder with anxious distress, currently mild    -  1       Follow-ups after your visit        Your next 10 appointments already scheduled     Jun 13, 2017 11:00 AM CDT   (Arrive by 10:45 AM)   Return Visit with Kandace Andrew [facility] [place] [facility] ([facility])    [address] [place] [phone] [phone]              Who to contact     If you have questions or need follow up information about today's clinic visit or your schedule please contact [facility] directly at [phone].  Normal or non-critical lab and imaging results will be communicated to you by MyChart, letter or phone within 4 business days after the clinic has received the results. If you do not hear from us within 7 days, please contact the clinic through MyChart or phone. If you have a critical or abnormal lab result, we will notify you by phone as soon as possible.  Submit refill requests through [employer] or call your pharmacy and they will forward the refill request to us. Please allow 3 business days for your refill to be completed.          Additional Information About Your Visit        MyChart Information     [employer] lets you send messages to your doctor, view your test results, renew your prescriptions, schedule appointments and more. To sign up, go to [email or web address] . Click on \"Log in\" on the left side of the screen, which will take you to the Welcome page. Then click on \"Sign up Now\" on the right side of the page.     You will be asked to enter the access code listed below, as well as some personal information. Please follow the directions to create your " username and password.     Your access code is: [email or web address]  Expires: 2017 11:55 AM     Your access code will  in 90 days. If you need help or a new code, please call your [facility] or [phone].        Care EveryWhere ID     This is your Care EveryWhere ID. This could be used by other organizations to access your [place] medical records  [id number]         Blood Pressure from Last 3 Encounters:   17 114/62   17 118/62   16 116/74    Weight from Last 3 Encounters:   17 155 lb (70.3 kg)   17 154 lb (69.9 kg)   16 150 lb (68 kg)              Today, you had the following     No orders found for display       Primary Care Provider Office Phone # Fax #    Carolyn Ritter [phone]       [address] [address]        Thank you!     Thank you for choosing [facility]  for your care. Our goal is always to provide you with excellent care. Hearing back from our patients is one way we can continue to improve our services. Please take a few minutes to complete the written survey that you may receive in the mail after your visit with us. Thank you!             Your Updated Medication List - Protect others around you: Learn how to safely use, store and throw away your medicines at www.disposemymeds.org.          This list is accurate as of: 17 11:59 PM.  Always use your most recent med list.                   Brand Name Dispense Instructions for use    acetaminophen 500 MG tablet    ACETAMINOPHEN EXTRA STRENGTH    90 tablet    Take 2 tablets (1,000 mg) by mouth every 8 hours as needed for pain or fever       B Complex-C Caps     100 capsule    Take 1 capsule by mouth daily       calcium carbonate 500 MG chewable tablet    TUMS    180 tablet    Take 1 tablet (500 mg) by mouth 2 times daily       diazepam 5 MG tablet    VALIUM    40 tablet    Take 1-2 tablets (5-10 mg) by mouth every 6  hours as needed for muscle spasms (MUSCLE SPASM)       estrogens (conjugated) 0.3 MG tablet    PREMARIN    90 tablet    Take 1 tablet (0.3 mg) by mouth daily       fish oil-omega-3 fatty acids 1000 MG capsule     90 capsule    Take 3 capsules (3,000 mg) by mouth daily       HYDROcodone-acetaminophen 7.5-325 MG per tablet    NORCO    60 tablet    Take 1 tablet by mouth every 6 hours as needed for moderate to severe pain       ibuprofen 800 MG tablet    ADVIL/MOTRIN    90 tablet    Take 1 tablet (800 mg) by mouth every 8 hours as needed       order for DME     1 Device    Equipment being ordered: gamekeeper/thumb spica splint       progesterone 100 MG capsule    PROMETRIUM    90 capsule    Take 1 capsule (100 mg) by mouth daily       VITAMIN D3 PO      Take 1,000 Units by mouth daily

## 2017-05-30 NOTE — PROGRESS NOTES
Treatment Plan    Client's Name: Lisa Angeles  YOB: 1955    Date: May 23, 2017    DSM-V Diagnoses:   300.4 Persistent Depressive Disorder, Late onset  300.02 (F41.1) Generalized Anxiety Disorder (provisional)  DA Date: 2/2/17  Psychosocial / Contextual Factors:   biological, social, interpersonal  WHODAS: 37    Referral / Collaboration:  Referral to another professional/service is not indicated at this time.    Services to be provided/Interventions:   Interventions will be to alleviate anxiety, alleviate depressed mood, increase coping skills, process losses, teach CBT skills, teach mindfulness skills and teach relaxation strategies.    Functional Impairment:   Activities of Daily Living - lack of motivation, energy, interest  Social / Relational - anxiety is affecting relationships and social events    Anticipated number of session: 6      MeasurableTreatment Goal(s) related to diagnosis / functional impairment(s)  Goal 1: Client will report a reduction in depressed mood 5 sessions out of 6.     Objective A (Client Action)                  Client will learn 5 CBT skills to use for reducing depressed mood.     Objective B  Client will report using at least 2 CBT skills 5 days out of 7.     Objective C  Client will learn and use mindfulness skills 5 days out of 7.     Goal 2: Client will report a decrease in anxiety 5 out of 6 sessions.     Objective A (Client Action)                  Client will learn 5 relaxation skills.     Objective B  Client will report using relaxations skills 5 days out of 7.    VILLA EduardoSW

## 2017-05-30 NOTE — PROGRESS NOTES
Progress Note    Client Name: Lisa Angeles  Date: May 23, 2017         Service Type: Individual      Session Start Time: 11:05  Session End Time: 12:00      Session Length: 55 minutes     Session #: 5     Attendees: Client attended alone     DSM-V Diagnoses:   300.4 Persistent Depressive Disorder, Late onset  300.02 (F41.1) Generalized Anxiety Disorder (provisional)  DA Date: 2/2/17    Treatment Plan Due: 8/23/17  PHQ-9 :   PHQ-9 SCORE 3/2/2017 5/3/2017 5/23/2017   Total Score - - -   Total Score 6 3 3      DAYTON-7 :    DAYTON-7 SCORE 4/12/2017 5/3/2017 5/23/2017   Total Score 13 7 3           DATA      Progress Since Last Session (Related to Symptoms / Goals / Homework):  Symptoms: somewhat improving     Homework: Achieved / completed to satisfaction     Current / Ongoing Stressors and Concerns:  Lisa stated that she feels she had a setback in regards to her depressive symptoms and coping. She reports increased feelings of guilt. Lisa also explained that she has been missing her loved ones who passed away recently.     Treatment Objective(s) Addressed in This Session:   Objective 1A (Client Action)                  Client will learn 5 CBT skills to use for reducing depressed mood.     Intervention:   Provided psycho-education utilizing CBT. Discussed grief and loss.     Response to Interventions:   Lisa stated that she understood this information.     ASSESSMENT: Current Emotional / Mental Status (status of significant symptoms):   Risk status (Self / Other harm or suicidal ideation)   Client denies current fears or concerns for personal safety.   Client denies current or recent suicidal ideation or behaviors.   Client denies current or recent homicidal ideation or behaviors.   Client denies current or recent self injurious behavior or ideation.   Client denies other safety concerns.   A safety and risk management plan has not been developed at this time, however  client was given the after-hours number / 911 should there be a change in any of these risk factors.     Appearance:   Appropriate    Eye Contact:   Good    Psychomotor Behavior: Normal    Attitude:   Cooperative    Orientation:   All   Speech    Rate / Production: Normal     Volume:  Normal    Mood:    Anxious  Sad    Affect:    Worrisome    Thought Content:  Clear    Thought Form:  Coherent  Circumstantial   Insight:    Fair         Medication Compliance:   Yes     Changes in Health Issues:   None reported     Chemical Use Review:   Substance Use: Chemical use reviewed, no active concerns identified      Tobacco Use: No current tobacco use.       Collateral Reports Completed:   Not Applicable    PLAN: (Client Tasks / Therapist Tasks / Other)  Lisa was asked to work on these skills at home and to return for follow up appointments.    VILLA EduardoSW

## 2017-05-31 ASSESSMENT — ANXIETY QUESTIONNAIRES: GAD7 TOTAL SCORE: 3

## 2017-05-31 ASSESSMENT — PATIENT HEALTH QUESTIONNAIRE - PHQ9: SUM OF ALL RESPONSES TO PHQ QUESTIONS 1-9: 3

## 2017-06-13 ENCOUNTER — OFFICE VISIT (OUTPATIENT)
Dept: PSYCHOLOGY | Facility: OTHER | Age: 62
End: 2017-06-13
Attending: SOCIAL WORKER
Payer: COMMERCIAL

## 2017-06-13 DIAGNOSIS — F34.1 PERSISTENT DEPRESSIVE DISORDER WITH ANXIOUS DISTRESS, CURRENTLY MILD: Primary | ICD-10-CM

## 2017-06-13 PROCEDURE — 90837 PSYTX W PT 60 MINUTES: CPT | Performed by: SOCIAL WORKER

## 2017-06-13 ASSESSMENT — ANXIETY QUESTIONNAIRES
GAD7 TOTAL SCORE: 4
5. BEING SO RESTLESS THAT IT IS HARD TO SIT STILL: NOT AT ALL
7. FEELING AFRAID AS IF SOMETHING AWFUL MIGHT HAPPEN: NOT AT ALL
2. NOT BEING ABLE TO STOP OR CONTROL WORRYING: SEVERAL DAYS
6. BECOMING EASILY ANNOYED OR IRRITABLE: SEVERAL DAYS
3. WORRYING TOO MUCH ABOUT DIFFERENT THINGS: SEVERAL DAYS
IF YOU CHECKED OFF ANY PROBLEMS ON THIS QUESTIONNAIRE, HOW DIFFICULT HAVE THESE PROBLEMS MADE IT FOR YOU TO DO YOUR WORK, TAKE CARE OF THINGS AT HOME, OR GET ALONG WITH OTHER PEOPLE: NOT DIFFICULT AT ALL
1. FEELING NERVOUS, ANXIOUS, OR ON EDGE: SEVERAL DAYS

## 2017-06-13 ASSESSMENT — PATIENT HEALTH QUESTIONNAIRE - PHQ9: 5. POOR APPETITE OR OVEREATING: NOT AT ALL

## 2017-06-13 NOTE — MR AVS SNAPSHOT
"              After Visit Summary   2017    Lisa Angeles    MRN: [id number]           Patient Information     Date Of Birth          1955        Visit Information        Provider Department      2017 11:00 AM Kandace Andrew LICSW [facility]        Today's Diagnoses     Persistent depressive disorder with anxious distress, currently mild    -  1       Follow-ups after your visit        Who to contact     If you have questions or need follow up information about today's clinic visit or your schedule please contact [facility] directly at [phone].  Normal or non-critical lab and imaging results will be communicated to you by Techgeniahart, letter or phone within 4 business days after the clinic has received the results. If you do not hear from us within 7 days, please contact the clinic through Techgeniahart or phone. If you have a critical or abnormal lab result, we will notify you by phone as soon as possible.  Submit refill requests through MangoPlate or call your pharmacy and they will forward the refill request to us. Please allow 3 business days for your refill to be completed.          Additional Information About Your Visit        MyChart Information     MangoPlate lets you send messages to your doctor, view your test results, renew your prescriptions, schedule appointments and more. To sign up, go to [email or web address] . Click on \"Log in\" on the left side of the screen, which will take you to the Welcome page. Then click on \"Sign up Now\" on the right side of the page.     You will be asked to enter the access code listed below, as well as some personal information. Please follow the directions to create your username and password.     Your access code is: [email or web address]  Expires: 2017 11:55 AM     Your access code will  in 90 days. If you need help or a new code, please call your [place] clinic or [phone].        Care EveryWhere ID     This is your Care " EveryWhere ID. This could be used by other organizations to access your [facility] medical records  [id number]         Blood Pressure from Last 3 Encounters:   03/23/17 114/62   02/13/17 118/62   12/20/16 116/74    Weight from Last 3 Encounters:   03/23/17 155 lb (70.3 kg)   02/13/17 154 lb (69.9 kg)   12/20/16 150 lb (68 kg)              Today, you had the following     No orders found for display       Primary Care Provider Office Phone # Fax #    Carolyn Ritter [phone]       [facility] [address] [place] [id number]        Thank you!     Thank you for choosing [facility]  for your care. Our goal is always to provide you with excellent care. Hearing back from our patients is one way we can continue to improve our services. Please take a few minutes to complete the written survey that you may receive in the mail after your visit with us. Thank you!             Your Updated Medication List - Protect others around you: Learn how to safely use, store and throw away your medicines at www.disposemymeds.org.          This list is accurate as of: 6/13/17 11:59 PM.  Always use your most recent med list.                   Brand Name Dispense Instructions for use    acetaminophen 500 MG tablet    ACETAMINOPHEN EXTRA STRENGTH    90 tablet    Take 2 tablets (1,000 mg) by mouth every 8 hours as needed for pain or fever       B Complex-C Caps     100 capsule    Take 1 capsule by mouth daily       calcium carbonate 500 MG chewable tablet    TUMS    180 tablet    Take 1 tablet (500 mg) by mouth 2 times daily       diazepam 5 MG tablet    VALIUM    40 tablet    Take 1-2 tablets (5-10 mg) by mouth every 6 hours as needed for muscle spasms (MUSCLE SPASM)       estrogens (conjugated) 0.3 MG tablet    PREMARIN    90 tablet    Take 1 tablet (0.3 mg) by mouth daily       fish oil-omega-3 fatty acids 1000 MG capsule     90 capsule    Take 3 capsules (3,000 mg) by mouth daily        HYDROcodone-acetaminophen 7.5-325 MG per tablet    NORCO    60 tablet    Take 1 tablet by mouth every 6 hours as needed for moderate to severe pain       ibuprofen 800 MG tablet    ADVIL/MOTRIN    90 tablet    Take 1 tablet (800 mg) by mouth every 8 hours as needed       order for DME     1 Device    Equipment being ordered: gamekeeper/thumb spica splint       progesterone 100 MG capsule    PROMETRIUM    90 capsule    Take 1 capsule (100 mg) by mouth daily       VITAMIN D3 PO      Take 1,000 Units by mouth daily

## 2017-06-21 NOTE — PROGRESS NOTES
Progress Note    Client Name: Lisa Angeles  Date: June 13, 2017         Service Type: Individual      Session Start Time: 11:00  Session End Time: 11:58      Session Length: 58 minutes     Session #: 6     Attendees: Client attended alone     DSM-V Diagnoses:   300.4 Persistent Depressive Disorder, Late onset  300.02 (F41.1) Generalized Anxiety Disorder (provisional)  DA Date: 2/2/17    Treatment Plan Due: 8/23/17  PHQ-9 :   PHQ-9 SCORE 5/3/2017 5/23/2017 6/13/2017   Total Score - - -   Total Score 3 3 2      DAYTON-7 :    DAYTON-7 SCORE 5/3/2017 5/23/2017 6/13/2017   Total Score 7 3 4           DATA      Progress Since Last Session (Related to Symptoms / Goals / Homework):  Symptoms: somewhat improving     Homework: Achieved / completed to satisfaction     Current / Ongoing Stressors and Concerns:  Lisa reports that she has been feeling increasingly better. She also explained that she has been using positive coping skills at home with much relief from her symptoms. Lisa also stated that her and her  are working on communication skills and this is going well. Lisa reports that she would like this to be her last appointment, which is appropriate at this time.     Treatment Objective(s) Addressed in This Session:   Objective 1A (Client Action)                  Client will learn 5 CBT skills to use for reducing depressed mood.     Intervention:   Provided psycho-education utilizing CBT. Worked on relapse prevention.      Response to Interventions:   Lisa stated that she understood this information.     ASSESSMENT: Current Emotional / Mental Status (status of significant symptoms):   Risk status (Self / Other harm or suicidal ideation)   Client denies current fears or concerns for personal safety.   Client denies current or recent suicidal ideation or behaviors.   Client denies current or recent homicidal ideation or behaviors.   Client denies current or recent  self injurious behavior or ideation.   Client denies other safety concerns.   A safety and risk management plan has not been developed at this time, however client was given the after-hours number / 911 should there be a change in any of these risk factors.     Appearance:   Appropriate    Eye Contact:   Good    Psychomotor Behavior: Normal    Attitude:   Cooperative    Orientation:   All   Speech    Rate / Production: Normal     Volume:  Normal    Mood:    Normal   Affect:    Appropriate    Thought Content:  Clear    Thought Form:  Coherent  Circumstantial   Insight:    Fair         Medication Compliance:   Yes     Changes in Health Issues:   None reported     Chemical Use Review:   Substance Use: Chemical use reviewed, no active concerns identified      Tobacco Use: No current tobacco use.       Collateral Reports Completed:   Not Applicable    PLAN: (Client Tasks / Therapist Tasks / Other)  This will be Lisa's last appointment. It was explained to her that she can return for future appointments if needed.    VILLA EduardoSW

## 2017-06-22 ASSESSMENT — PATIENT HEALTH QUESTIONNAIRE - PHQ9: SUM OF ALL RESPONSES TO PHQ QUESTIONS 1-9: 2

## 2017-06-22 ASSESSMENT — ANXIETY QUESTIONNAIRES: GAD7 TOTAL SCORE: 4

## 2017-06-28 ENCOUNTER — TELEPHONE (OUTPATIENT)
Dept: FAMILY MEDICINE | Facility: OTHER | Age: 62
End: 2017-06-28

## 2017-06-28 DIAGNOSIS — R69 DIAGNOSIS UNKNOWN: ICD-10-CM

## 2017-06-28 DIAGNOSIS — Z00.00 HEALTHCARE MAINTENANCE: Primary | ICD-10-CM

## 2017-06-28 NOTE — TELEPHONE ENCOUNTER
B Complex   Last office visit: 03/23/17  Last refill: 05/23/16 #100 with 3 refills    Vitamin D  Last office visit: 03/23/17  Last refill: historical  Medication historically entered on patient's current medication list.       Thank you.

## 2017-06-29 RX ORDER — B-COMPLEX WITH VITAMIN C
1 CAPSULE ORAL DAILY
Qty: 100 CAPSULE | Refills: 3 | Status: SHIPPED | OUTPATIENT
Start: 2017-06-29 | End: 2018-07-09

## 2017-07-27 DIAGNOSIS — M54.50 LUMBAGO: ICD-10-CM

## 2017-07-27 DIAGNOSIS — R69 DIAGNOSIS UNKNOWN: ICD-10-CM

## 2017-07-28 ENCOUNTER — TELEPHONE (OUTPATIENT)
Dept: FAMILY MEDICINE | Facility: OTHER | Age: 62
End: 2017-07-28

## 2017-07-28 ENCOUNTER — OFFICE VISIT (OUTPATIENT)
Dept: FAMILY MEDICINE | Facility: OTHER | Age: 62
End: 2017-07-28
Attending: FAMILY MEDICINE
Payer: OTHER MISCELLANEOUS

## 2017-07-28 VITALS
TEMPERATURE: 98.6 F | RESPIRATION RATE: 16 BRPM | DIASTOLIC BLOOD PRESSURE: 62 MMHG | BODY MASS INDEX: 27.97 KG/M2 | HEART RATE: 73 BPM | SYSTOLIC BLOOD PRESSURE: 118 MMHG | WEIGHT: 152 LBS | OXYGEN SATURATION: 97 % | HEIGHT: 62 IN

## 2017-07-28 DIAGNOSIS — M54.41 CHRONIC BILATERAL LOW BACK PAIN WITH RIGHT-SIDED SCIATICA: Primary | ICD-10-CM

## 2017-07-28 DIAGNOSIS — M54.31 SCIATICA OF RIGHT SIDE: ICD-10-CM

## 2017-07-28 DIAGNOSIS — G89.29 CHRONIC BILATERAL LOW BACK PAIN WITH RIGHT-SIDED SCIATICA: Primary | ICD-10-CM

## 2017-07-28 PROCEDURE — 99213 OFFICE O/P EST LOW 20 MIN: CPT | Performed by: FAMILY MEDICINE

## 2017-07-28 RX ORDER — DIAZEPAM 5 MG
5-10 TABLET ORAL EVERY 6 HOURS PRN
Qty: 40 TABLET | Refills: 1 | Status: SHIPPED | OUTPATIENT
Start: 2017-07-28 | End: 2018-07-02

## 2017-07-28 ASSESSMENT — PAIN SCALES - GENERAL: PAINLEVEL: MODERATE PAIN (4)

## 2017-07-28 NOTE — MR AVS SNAPSHOT
"              After Visit Summary   7/28/2017    Lisa Angeles    MRN: [id number]           Patient Information     Date Of Birth          1955        Visit Information        Provider Department      7/28/2017 2:45 PM Carolyn Ritter MD [facility]        Today's Diagnoses     Chronic bilateral low back pain with right-sided sciatica    -  1    Sciatica of right side           Follow-ups after your visit        Follow-up notes from your care team     Return in about 6 months (around 1/28/2018).      Your next 10 appointments already scheduled     Dec 19, 2017 11:00 AM CST   (Arrive by 10:45 AM)   Return Visit with CHASITY Bell MD   [facility] ([facility] )    [address]  [place] [address] [phone]              Who to contact     If you have questions or need follow up information about today's clinic visit or your schedule please contact [facility] directly at [phone].  Normal or non-critical lab and imaging results will be communicated to you by MyChart, letter or phone within 4 business days after the clinic has received the results. If you do not hear from us within 7 days, please contact the clinic through SportsMEDIA Technologyhart or phone. If you have a critical or abnormal lab result, we will notify you by phone as soon as possible.  Submit refill requests through [employer] or call your pharmacy and they will forward the refill request to us. Please allow 3 business days for your refill to be completed.          Additional Information About Your Visit        MyChart Information     [employer] lets you send messages to your doctor, view your test results, renew your prescriptions, schedule appointments and more. To sign up, go to [email or web address] . Click on \"Log in\" on the left side of the screen, which will take you to the Welcome page. Then click on \"Sign up Now\" on the right side of the page.     You will be asked to " "enter the access code listed below, as well as some personal information. Please follow the directions to create your username and password.     Your access code is: [email or web address]  Expires: 2017 11:55 AM     Your access code will  in 90 days. If you need help or a new code, please call your [place] clinic or [phone].        Care EveryWhere ID     This is your Care EveryWhere ID. This could be used by other organizations to access your [place] medical records  [id number]        Your Vitals Were     Pulse Temperature Respirations Height Pulse Oximetry BMI (Body Mass Index)    73 98.6  F (37  C) (Tympanic) 16 5' 2\" (1.575 m) 97% 27.8 kg/m2       Blood Pressure from Last 3 Encounters:   17 118/62   17 114/62   17 118/62    Weight from Last 3 Encounters:   17 152 lb (68.9 kg)   17 155 lb (70.3 kg)   17 154 lb (69.9 kg)              Today, you had the following     No orders found for display         Where to get your medicines      Some of these will need a paper prescription and others can be bought over the counter.  Ask your nurse if you have questions.     Bring a paper prescription for each of these medications     diazepam 5 MG tablet          Primary Care Provider Office Phone # Fax #    Carolyncody Ritter [phone]       [place] [address]        Equal Access to Services     ERICA BRADFORD AH: Hadii rodrick ku hadasho Soomaali, waaxda luqadaha, qaybta kaalmada adeegyada, venita lopez. So [facility] [place] [phone].    ATENCIÓN: Si habla español, tiene a franklin disposición servicios gratuitos de asistencia lingüística. Llame al [phone].    We comply with applicable federal civil rights laws and [place] laws. We do not discriminate on the basis of race, color, national origin, age, disability sex, sexual orientation or gender identity.            Thank you!     Thank you for " choosing [facility]  for your care. Our goal is always to provide you with excellent care. Hearing back from our patients is one way we can continue to improve our services. Please take a few minutes to complete the written survey that you may receive in the mail after your visit with us. Thank you!             Your Updated Medication List - Protect others around you: Learn how to safely use, store and throw away your medicines at www.disposemymeds.org.          This list is accurate as of: 7/28/17  3:19 PM.  Always use your most recent med list.                   Brand Name Dispense Instructions for use Diagnosis    acetaminophen 500 MG tablet    ACETAMINOPHEN EXTRA STRENGTH    90 tablet    Take 2 tablets (1,000 mg) by mouth every 8 hours as needed for pain or fever    Pain in shoulder       B Complex-C Caps     100 capsule    Take 1 capsule by mouth daily    Diagnosis unknown       calcium carbonate 500 MG chewable tablet    TUMS    180 tablet    Take 1 tablet (500 mg) by mouth 2 times daily    Lumbago       cholecalciferol 1000 UNIT tablet    vitamin D    30 tablet    Take 1 tablet (1,000 Units) by mouth daily    Healthcare maintenance       diazepam 5 MG tablet    VALIUM    40 tablet    Take 1-2 tablets (5-10 mg) by mouth every 6 hours as needed for muscle spasms (MUSCLE SPASM)        estrogens (conjugated) 0.3 MG tablet    PREMARIN    90 tablet    Take 1 tablet (0.3 mg) by mouth daily    Menopausal syndrome (hot flashes)       fish oil-omega-3 fatty acids 1000 MG capsule     90 capsule    Take 3 capsules (3,000 mg) by mouth daily    Hyperlipidemia, unspecified hyperlipidemia type       HYDROcodone-acetaminophen 7.5-325 MG per tablet    NORCO    60 tablet    Take 1 tablet by mouth every 6 hours as needed for moderate to severe pain    Chronic bilateral low back pain with right-sided sciatica       ibuprofen 800 MG tablet    ADVIL/MOTRIN    90 tablet    Take 1 tablet (800 mg) by mouth every 8 hours  as needed    Chronic bilateral low back pain with right-sided sciatica       order for DME     1 Device    Equipment being ordered: gamekeeper/thumb spica splint    Radial styloid tenosynovitis       progesterone 100 MG capsule    PROMETRIUM    90 capsule    Take 1 capsule (100 mg) by mouth daily    Menopausal syndrome (hot flashes)

## 2017-07-28 NOTE — TELEPHONE ENCOUNTER
Patient would like refill done today if possible. Advised that refill may take up to 3 business days to complete, but patient states she is out as of today.
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
## 2017-07-28 NOTE — NURSING NOTE
"Chief Complaint   Patient presents with     Musculoskeletal Problem     back pain, sciatica into shoulders and neck       Initial /62 (BP Location: Left arm, Patient Position: Sitting, Cuff Size: Adult Regular)  Pulse 73  Temp 98.6  F (37  C) (Tympanic)  Resp 16  Ht 5' 2\" (1.575 m)  Wt 152 lb (68.9 kg)  SpO2 97%  BMI 27.8 kg/m2 Estimated body mass index is 27.8 kg/(m^2) as calculated from the following:    Height as of this encounter: 5' 2\" (1.575 m).    Weight as of this encounter: 152 lb (68.9 kg).  Medication Reconciliation: complete     Jessica Joseph      "

## 2017-07-31 RX ORDER — B-COMPLEX WITH VITAMIN C
CAPSULE ORAL
Qty: 30 CAPSULE | OUTPATIENT
Start: 2017-07-31

## 2017-07-31 RX ORDER — CALCIUM CARBONATE 500 MG/1
1 TABLET, CHEWABLE ORAL 2 TIMES DAILY
Qty: 180 TABLET | Refills: 3 | Status: SHIPPED | OUTPATIENT
Start: 2017-07-31 | End: 2018-06-07

## 2017-07-31 RX ORDER — B-COMPLEX WITH VITAMIN C
1 CAPSULE ORAL DAILY
Qty: 100 CAPSULE | Refills: 3 | OUTPATIENT
Start: 2017-07-31

## 2017-07-31 RX ORDER — CALCIUM CARBONATE 500 MG/1
TABLET, CHEWABLE ORAL
Qty: 180 TABLET | OUTPATIENT
Start: 2017-07-31

## 2017-10-03 ENCOUNTER — TELEPHONE (OUTPATIENT)
Dept: FAMILY MEDICINE | Facility: OTHER | Age: 62
End: 2017-10-03

## 2017-10-03 DIAGNOSIS — N95.1 MENOPAUSAL SYNDROME (HOT FLASHES): ICD-10-CM

## 2017-10-03 NOTE — TELEPHONE ENCOUNTER
Premarin      Last Written Prescription Date: 10/17/16  Last Fill Quantity: 90, # refills: 3  Last Office Visit with [facility], [facility] or [facility] prescribing provider: 7/28/17  Next 5 appointments (look out 90 days)     Oct 12, 2017  2:00 PM CDT   (Arrive by 1:45 PM)   SHORT with Carolyn iRtter MD   [facility] ([facility] )    [address]  [place] [address]   [phone]            Dec 19, 2017 11:00 AM CST   (Arrive by 10:45 AM)   Return Visit with CHASITY Bell MD   [facility] ([facility]    [address] [place] [id number] [phone]                   BP Readings from Last 3 Encounters:   07/28/17 118/62   03/23/17 114/62   02/13/17 118/62     Date of last Breast Exam: 12/9/16

## 2017-10-03 NOTE — TELEPHONE ENCOUNTER
8:30 AM    Reason for Call: Phone Call    Description: Need a form stating she can go [employer] membership for back strengthening. Prescription ?    Was an appointment offered for this call? No  If yes : Appointment type              Date    Preferred method for responding to this message: Telephone Call  What is your phone number ?[phone]    If we cannot reach you directly, may we leave a detailed response at the number you provided? Yes    Can this message wait until your PCP/provider returns, if available today? No,     Anju Baptiste

## 2017-10-04 NOTE — TELEPHONE ENCOUNTER
Please see notes below.  I have pended Premarin because patient is due for mammogram.  I have called Ari and she has not picked up this Rx.  I am thinking it was the [employer] note.  Medication pended.  Please advise.  Thank you.

## 2017-10-05 RX ORDER — CONJUGATED ESTROGENS 0.3 MG/1
TABLET, FILM COATED ORAL
Qty: 90 TABLET | Refills: 0 | Status: SHIPPED | OUTPATIENT
Start: 2017-10-05 | End: 2018-01-03

## 2017-10-12 ENCOUNTER — OFFICE VISIT (OUTPATIENT)
Dept: FAMILY MEDICINE | Facility: OTHER | Age: 62
End: 2017-10-12
Attending: FAMILY MEDICINE
Payer: OTHER MISCELLANEOUS

## 2017-10-12 VITALS
BODY MASS INDEX: 27.71 KG/M2 | OXYGEN SATURATION: 98 % | WEIGHT: 150.6 LBS | RESPIRATION RATE: 12 BRPM | HEART RATE: 76 BPM | DIASTOLIC BLOOD PRESSURE: 72 MMHG | SYSTOLIC BLOOD PRESSURE: 114 MMHG | HEIGHT: 62 IN | TEMPERATURE: 97.6 F

## 2017-10-12 DIAGNOSIS — M54.41 CHRONIC BILATERAL LOW BACK PAIN WITH RIGHT-SIDED SCIATICA: Primary | ICD-10-CM

## 2017-10-12 DIAGNOSIS — G89.29 CHRONIC BILATERAL LOW BACK PAIN WITH RIGHT-SIDED SCIATICA: Primary | ICD-10-CM

## 2017-10-12 DIAGNOSIS — M54.2 CERVICALGIA: ICD-10-CM

## 2017-10-12 PROCEDURE — 99213 OFFICE O/P EST LOW 20 MIN: CPT | Performed by: FAMILY MEDICINE

## 2017-10-12 RX ORDER — IBUPROFEN 800 MG/1
800 TABLET, FILM COATED ORAL EVERY 8 HOURS PRN
Qty: 90 TABLET | Refills: 3 | Status: SHIPPED | OUTPATIENT
Start: 2017-10-12 | End: 2019-02-05

## 2017-10-12 RX ORDER — HYDROCODONE BITARTRATE AND ACETAMINOPHEN 7.5; 325 MG/1; MG/1
1 TABLET ORAL EVERY 6 HOURS PRN
Qty: 60 TABLET | Refills: 0 | Status: SHIPPED | OUTPATIENT
Start: 2017-10-12 | End: 2018-07-02

## 2017-10-12 NOTE — NURSING NOTE
"Chief Complaint   Patient presents with     Work Comp       Initial /72 (BP Location: Left arm, Patient Position: Sitting, Cuff Size: Adult Large)  Pulse 76  Temp 97.6  F (36.4  C) (Tympanic)  Resp 12  Ht 5' 2\" (1.575 m)  Wt 150 lb 9.6 oz (68.3 kg)  SpO2 98%  BMI 27.55 kg/m2 Estimated body mass index is 27.55 kg/(m^2) as calculated from the following:    Height as of this encounter: 5' 2\" (1.575 m).    Weight as of this encounter: 150 lb 9.6 oz (68.3 kg).  Medication Reconciliation: complete   Agatha Montgomery      "

## 2017-10-12 NOTE — MR AVS SNAPSHOT
"              After Visit Summary   10/12/2017    Lisa Angeles    MRN: [id number]           Patient Information     Date Of Birth          1955        Visit Information        Provider Department      10/12/2017 2:00 PM Carolyn Ritter MD [facility]        Today's Diagnoses     Chronic bilateral low back pain with right-sided sciatica    -  1    Cervicalgia           Follow-ups after your visit        Additional Services     PHYSICAL THERAPY REFERRAL       *This therapy referral will be filtered to a centralized scheduling office at [facility] and the patient will receive a call to schedule an appointment at a [facility] location most convenient for them. *     [facility] provides Physical Therapy evaluation and treatment and many specialty services across the [facility] system.  If requesting a specialty program, please choose from the list below.    If you have not heard from the scheduling office within 2 business days, please call [phone] for all locations, with the exception of [place], please call [phone].  Treatment: Evaluation & Treatment  Special Instructions/Modalities: [facility] at [facility]  Special Programs: None    Please be aware that coverage of these services is subject to the terms and limitations of your health insurance plan.  Call member services at your health plan with any benefit or coverage questions.      **Note to Provider:  If you are referring outside of [facility] for the therapy appointment, please list the name of the location in the \"special instructions\" above, print the referral and give to the patient to schedule the appointment.                  Follow-up notes from your care team     Return if symptoms worsen or fail to improve.      Your next 10 appointments already scheduled     Dec 19, 2017 11:00 AM CST   (Arrive by 10:45 AM)   Return Visit with CHASITY Bell MD   [facility] ([facility] " "[facility] )    [address] [place] [address] [phone]              Who to contact     If you have questions or need follow up information about today's clinic visit or your schedule please contact [facility] directly at [phone].  Normal or non-critical lab and imaging results will be communicated to you by MyChart, letter or phone within 4 business days after the clinic has received the results. If you do not hear from us within 7 days, please contact the clinic through MyChart or phone. If you have a critical or abnormal lab result, we will notify you by phone as soon as possible.  Submit refill requests through Telovations or call your pharmacy and they will forward the refill request to us. Please allow 3 business days for your refill to be completed.          Additional Information About Your Visit        MyChart Information     Telovations lets you send messages to your doctor, view your test results, renew your prescriptions, schedule appointments and more. To sign up, go to [email or web address] . Click on \"Log in\" on the left side of the screen, which will take you to the Welcome page. Then click on \"Sign up Now\" on the right side of the page.     You will be asked to enter the access code listed below, as well as some personal information. Please follow the directions to create your username and password.     Your access code is: [email or web address]  Expires: 1/10/2018  3:17 PM     Your access code will  in 90 days. If you need help or a new code, please call your [facility] or [phone].        Care EveryWhere ID     This is your Care EveryWhere ID. This could be used by other organizations to access your [facility] medical records  [id number]        Your Vitals Were     Pulse Temperature Respirations Height Pulse Oximetry BMI (Body Mass Index)    76 97.6  F (36.4  C) (Tympanic) 12 5' 2\" (1.575 m) 98% 27.55 kg/m2       Blood Pressure from Last 3 Encounters: "   10/12/17 114/72   07/28/17 118/62   03/23/17 114/62    Weight from Last 3 Encounters:   10/12/17 150 lb 9.6 oz (68.3 kg)   07/28/17 152 lb (68.9 kg)   03/23/17 155 lb (70.3 kg)              We Performed the Following     PHYSICAL THERAPY REFERRAL          Where to get your medicines      These medications were sent to [facility] [id number] [place] [address]  AT [facility] [address] [place] , [facility] [address]     Phone:  [phone]     ibuprofen 800 MG tablet         Some of these will need a paper prescription and others can be bought over the counter.  Ask your nurse if you have questions.     Bring a paper prescription for each of these medications     HYDROcodone-acetaminophen 7.5-325 MG per tablet          Primary Care Provider Office Phone # Fax #    Carolyn KINSEY Ritter [phone] [address] [facility] [address]        Equal Access to Services     [facility]: Hadii rodrick ku hadasho Soomaali, waaxda luqadaha, qaybta kaalmada adeegyada, waxay idiin haykamar bradshaw . So [facility] [phone].    ATENCIÓN: Si habla español, tiene a franklin disposición servicios gratuitos de asistencia lingüística. Llame al [phone].    We comply with applicable federal civil rights laws and [place] laws. We do not discriminate on the basis of race, color, national origin, age, disability, sex, sexual orientation, or gender identity.            Thank you!     Thank you for choosing [facility]  for your care. Our goal is always to provide you with excellent care. Hearing back from our patients is one way we can continue to improve our services. Please take a few minutes to complete the written survey that you may receive in the mail after your visit with us. Thank you!             Your Updated Medication List - Protect others around you: Learn how to safely use, store and throw away your medicines at  www.disposemymeds.org.          This list is accurate as of: 10/12/17  3:17 PM.  Always use your most recent med list.                   Brand Name Dispense Instructions for use Diagnosis    acetaminophen 500 MG tablet    ACETAMINOPHEN EXTRA STRENGTH    90 tablet    Take 2 tablets (1,000 mg) by mouth every 8 hours as needed for pain or fever    Pain in shoulder       B Complex-C Caps     100 capsule    Take 1 capsule by mouth daily    Diagnosis unknown       calcium carbonate 500 MG chewable tablet    TUMS    180 tablet    Take 1 tablet (500 mg) by mouth 2 times daily    Lumbago       cholecalciferol 1000 UNIT tablet    vitamin D    30 tablet    Take 1 tablet (1,000 Units) by mouth daily    Healthcare maintenance       diazepam 5 MG tablet    VALIUM    40 tablet    Take 1-2 tablets (5-10 mg) by mouth every 6 hours as needed for muscle spasms (MUSCLE SPASM)        fish oil-omega-3 fatty acids 1000 MG capsule     90 capsule    Take 3 capsules (3,000 mg) by mouth daily    Hyperlipidemia, unspecified hyperlipidemia type       HYDROcodone-acetaminophen 7.5-325 MG per tablet    NORCO    60 tablet    Take 1 tablet by mouth every 6 hours as needed for moderate to severe pain    Chronic bilateral low back pain with right-sided sciatica       ibuprofen 800 MG tablet    ADVIL/MOTRIN    90 tablet    Take 1 tablet (800 mg) by mouth every 8 hours as needed    Chronic bilateral low back pain with right-sided sciatica       order for DME     1 Device    Equipment being ordered: gamekeeper/thumb spica splint    Radial styloid tenosynovitis       PREMARIN 0.3 MG tablet   Generic drug:  estrogens (conjugated)     90 tablet    TAKE 1 TABLET BY MOUTH EVERY DAY    Menopausal syndrome (hot flashes)       progesterone 100 MG capsule    PROMETRIUM    90 capsule    Take 1 capsule (100 mg) by mouth daily    Menopausal syndrome (hot flashes)

## 2017-10-12 NOTE — PROGRESS NOTES
"  SUBJECTIVE:   Lisa Angeles is a 62 year old female who presents to clinic today for the following health issues:    Back Pain Follow Up      Description:   Location of pain:  Bilateral, but now noting neck and shoulder symptoms when low back symptoms flaring  Character of pain: dull ache, cramping and intermittent  Pain radiation: Does not radiate  Since last visit, pain is:  worsened  New numbness or weakness in legs, not attributed to pain:  no     Intensity: moderate    History:   Pain interferes with job: Not applicable  Therapies tried with relief: activity, massage, muscle relaxants, NSAIDs, opioids, Physical Therapy and stretch     Accompanying Signs & Symptoms:  Risk of Fracture:  None  Risk of Cauda Equina:  None  Risk of Infection:  None  Risk of Cancer:  None    Patient states when her back symptoms flare, her change in mechanics is leading to neck spasm and strain.  She states the two areas seem to be linked.      Problem list and histories reviewed & adjusted, as indicated.  Additional history: as documented    PMH, PSH, Problem list, and medications are reviewed updated in the EMR.    No Known Allergies      Reviewed and updated as needed this visit by clinical staffTobacco  Allergies  Meds       Reviewed and updated as needed this visit by Provider         ROS:  Constitutional, HEENT, cardiovascular, pulmonary, gi and gu systems are negative, except as otherwise noted.      OBJECTIVE:   /72 (BP Location: Left arm, Patient Position: Sitting, Cuff Size: Adult Large)  Pulse 76  Temp 97.6  F (36.4  C) (Tympanic)  Resp 12  Ht 5' 2\" (1.575 m)  Wt 150 lb 9.6 oz (68.3 kg)  SpO2 98%  BMI 27.55 kg/m2  Body mass index is 27.55 kg/(m^2).  GENERAL: healthy, alert and no distress  NECK: difficulty with ROM, unable to tilt head  PSYCH: mentation appears normal, affect normal/bright    Diagnostic Test Results:  none     ASSESSMENT/PLAN:     1. Chronic bilateral low back pain with right-sided " sciatica  Medication renewed and PT ordered.  Follow-up as directed.  - HYDROcodone-acetaminophen (NORCO) 7.5-325 MG per tablet; Take 1 tablet by mouth every 6 hours as needed for moderate to severe pain  Dispense: 60 tablet; Refill: 0  - ibuprofen (ADVIL/MOTRIN) 800 MG tablet; Take 1 tablet (800 mg) by mouth every 8 hours as needed  Dispense: 90 tablet; Refill: 3  - PHYSICAL THERAPY REFERRAL    2. Cervicalgia  As above.  - PHYSICAL THERAPY REFERRAL        Carolyn Ritter MD  [facility]

## 2017-11-03 DIAGNOSIS — N95.1 MENOPAUSAL SYNDROME (HOT FLASHES): ICD-10-CM

## 2017-11-06 NOTE — TELEPHONE ENCOUNTER
PROGESTERONE 100MG CAPSULE    Last Written Prescription Date: 11/01/2016  Last Fill Quantity: 90,  # refills: 3   Last Office Visit with [facility], [facility] or [facility] prescribing provider: 10/21/2017                                         Next 5 appointments (look out 90 days)     Dec 19, 2017 11:00 AM CST   (Arrive by 10:45 AM)   Return Visit with CHASITY Bell MD   [facility] [place] ([facility] )    [address] [place] [address] [phone]

## 2017-11-08 NOTE — TELEPHONE ENCOUNTER
I will route this to Dr Marx to address  Last mammo on file over 1 year ago    Lilian Hair [facility]  [phone]

## 2017-11-14 ENCOUNTER — HOSPITAL ENCOUNTER (OUTPATIENT)
Dept: PHYSICAL THERAPY | Facility: OTHER | Age: 62
End: 2017-11-14
Attending: FAMILY MEDICINE
Payer: OTHER MISCELLANEOUS

## 2017-11-14 PROCEDURE — 97140 MANUAL THERAPY 1/> REGIONS: CPT | Mod: GP

## 2017-11-14 PROCEDURE — 97110 THERAPEUTIC EXERCISES: CPT | Mod: GP

## 2017-11-14 PROCEDURE — 97162 PT EVAL MOD COMPLEX 30 MIN: CPT | Mod: GP

## 2017-11-14 NOTE — PROGRESS NOTES
" 11/14/17 6490   General Information   Type of Visit Initial OP Ortho PT Evaluation   Start of Care Date 11/14/17   Referring Physician Dr. 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 She  has been seen in PT several times. She now returns to PT reporting increased back pain within the last few months and onset of neck pain about 7 months ago. She did move in August but did not do any heavy lifting etc. She has been trying to \"baby it\" , use her TENS, and do her exercises. She has noted some improvements when doing these things   Impairments A. Pain;D. Decreased ROM;E. Decreased flexibility;B. Decreased WB tolerance;F. Decreased strength and endurance;L. Tingling;M. Locking or catching;G. Impaired balance   Functional Limitations perform activities of daily living;perform desired leisure / sports activities   Symptom Location R low back to R SIJ/gluteal and at times down thigh to toes. Midlline CT pain w radiation to R>L neck.   How/Where did it occur At work   Chronicity Chronic   Pain rating (0-10 point scale) Best (/10);Worst (/10)   Best (/10) 2/10   Worst (/10) 7/10   Pain quality A. Sharp;B. Dull;C. Aching;E. Shooting;F. Stabbing   Frequency of pain/symptoms A. Constant   Pain/symptoms are: The same all the time   Pain/symptoms exacerbated by A. Sitting;B. Walking;C. Lifting;D. Carrying;G. Certain positions;H. Overhead reach;I. Bending;J. ADL;K. Home " tasks;M. Other   Pain exacerbation comment standing, sleeping   Pain/symptoms eased by K. Other  (TENS)   Progression of symptoms since onset: Improved   Current Level of Function   Patient role/employment history G. Disabled   Living environment House/townhome   Fall Risk Screen   Fall screen completed by PT   Per patient - Fall 2 or more times in past year? No   Per patient - Fall with injury in past year? No   Is patient a fall risk? No   System Outcome Measures   Outcome Measures Low Back Pain (see Oswestry and Adrian)   Lumbar Spine/SI Objective Findings   Observation no acute distress   Posture L illiac , shoulder are elevated. mild forward head posture with increased CT kyphosis   Gait/Locomotion normal   Flexion ROM 0-87 W R LB pull   Extension ROM 0-12 w pain R LB to thigh   Right Side Bending ROM 0-19 w pain R LS   Left Side Bending ROM 0-25 w pain R LB   Pelvic Screen + Samuel L AST   Hip Screen neg   Transversus Abdominus Strength (Woody Leg Lowering-deg) able to withstand almost max resistance with abs and trunk extensors   Lumbar/Hip/Knee/Foot Strength Comments able to heel and toe walk well   Hamstring Flexibility decreased - mild    Hip Flexor Flexibility decreased   Quadricep Flexibility decreased   Piriformis Flexibility decreased on R   SLR neg   Prone Instability Test neg   Crossover SLR neg   Slump Test neg   Segmental Mobility posterior rotation L5 to L3 to L in all planes   Palpation tenderness and soft tissue tension R lumbar paraspinals, R QL, R gluteal /piriformis mms   Planned Therapy Interventions   Planned Therapy Interventions joint mobilization;manual therapy;ROM;strengthening;stretching   Planned Modality Interventions   Planned Modality Interventions Cryotherapy;Electrical stimulation;Ultrasound   Clinical Impression   Criteria for Skilled Therapeutic Interventions Met yes, treatment indicated   PT Diagnosis R LBP w sciatica, Neck pain   Influenced by the following impairments pain,  limited ROM and strength,    Functional limitations due to impairments sit, stand, walking, ADLS, household activities all limited due to pain   Clinical Presentation Evolving/Changing   Clinical Presentation Rationale sx now include neck pain too   Clinical Decision Making (Complexity) Moderate complexity   Therapy Frequency 2 times/Week   Predicted Duration of Therapy Intervention (days/wks) up to 6 wks   Risk & Benefits of therapy have been explained Yes   Patient, Family & other staff in agreement with plan of care Yes   Clinical Impression Comments R back pain with mechanical and soft tissue restrictions. Neck pain - to further evaluate at later date   ORTHO GOALS   PT Ortho Eval Goals 1;3;2   Ortho Goal 1   Goal Identifier STG 1   Goal Description decrease pain when at its worst to 6/10   Ortho Goal 2   Goal Identifier LTG 1   Goal Description She will demonstrate independence with HEP   Target Date 12/26/17   Ortho Goal 3   Goal Identifier LTG 2   Goal Description She will be able to walk 1/4 to 1 mile.    Target Date 12/26/17   Total Evaluation Time   Total Evaluation Time 35

## 2017-11-17 ENCOUNTER — HOSPITAL ENCOUNTER (OUTPATIENT)
Dept: PHYSICAL THERAPY | Facility: OTHER | Age: 62
End: 2017-11-17
Attending: FAMILY MEDICINE
Payer: OTHER MISCELLANEOUS

## 2017-11-17 PROCEDURE — 97140 MANUAL THERAPY 1/> REGIONS: CPT | Mod: GP

## 2017-11-17 PROCEDURE — 97035 APP MDLTY 1+ULTRASOUND EA 15: CPT | Mod: GP

## 2017-11-21 ENCOUNTER — HOSPITAL ENCOUNTER (OUTPATIENT)
Dept: PHYSICAL THERAPY | Facility: OTHER | Age: 62
End: 2017-11-21
Attending: FAMILY MEDICINE
Payer: OTHER MISCELLANEOUS

## 2017-11-21 PROCEDURE — 97140 MANUAL THERAPY 1/> REGIONS: CPT | Mod: GP

## 2017-11-21 PROCEDURE — 97035 APP MDLTY 1+ULTRASOUND EA 15: CPT | Mod: GP

## 2017-11-29 ENCOUNTER — HOSPITAL ENCOUNTER (OUTPATIENT)
Dept: PHYSICAL THERAPY | Facility: OTHER | Age: 62
End: 2017-11-29
Attending: FAMILY MEDICINE
Payer: OTHER MISCELLANEOUS

## 2017-11-29 PROCEDURE — 97140 MANUAL THERAPY 1/> REGIONS: CPT | Mod: GP

## 2017-12-01 ENCOUNTER — HOSPITAL ENCOUNTER (OUTPATIENT)
Dept: PHYSICAL THERAPY | Facility: OTHER | Age: 62
End: 2017-12-01
Attending: FAMILY MEDICINE
Payer: OTHER MISCELLANEOUS

## 2017-12-01 PROCEDURE — 97140 MANUAL THERAPY 1/> REGIONS: CPT | Mod: GP

## 2017-12-08 ENCOUNTER — HOSPITAL ENCOUNTER (OUTPATIENT)
Dept: PHYSICAL THERAPY | Facility: OTHER | Age: 62
End: 2017-12-08
Attending: FAMILY MEDICINE
Payer: OTHER MISCELLANEOUS

## 2017-12-08 ENCOUNTER — RADIANT APPOINTMENT (OUTPATIENT)
Dept: MAMMOGRAPHY | Facility: OTHER | Age: 62
End: 2017-12-08
Attending: FAMILY MEDICINE
Payer: MEDICARE

## 2017-12-08 DIAGNOSIS — Z12.31 VISIT FOR SCREENING MAMMOGRAM: ICD-10-CM

## 2017-12-08 PROCEDURE — 77063 BREAST TOMOSYNTHESIS BI: CPT | Mod: TC

## 2017-12-08 PROCEDURE — 97140 MANUAL THERAPY 1/> REGIONS: CPT | Mod: GP

## 2017-12-08 PROCEDURE — G0202 SCR MAMMO BI INCL CAD: HCPCS | Mod: TC

## 2017-12-12 ENCOUNTER — HOSPITAL ENCOUNTER (OUTPATIENT)
Dept: PHYSICAL THERAPY | Facility: OTHER | Age: 62
End: 2017-12-12
Attending: FAMILY MEDICINE
Payer: OTHER MISCELLANEOUS

## 2017-12-12 PROCEDURE — 97140 MANUAL THERAPY 1/> REGIONS: CPT | Mod: GP

## 2017-12-14 NOTE — PROGRESS NOTES
"Outpatient Physical Therapy Discharge Note     Patient: Lisa Angeles  : 1955    Beginning/End Dates of Reporting Period:  17 to 2017    Referring Provider: Dr. Ritter    Therapy Diagnosis: Chronic LBP with R sciatica     Client Self Report: She was seen in PT 0930 to 1000. She reports back pain as \"pretty good\" with mild R LBP now.     Objective Measurements:  Objective Measure: spinal mechanics  Details: No SIJ dysfx                                      Outcome Measures (most recent score):      Goals:  Goal Identifier STG 1   Goal Description decrease pain when at its worst to 6/10   Target Date 17   Date Met  17   Progress:     Goal Identifier LTG 1   Goal Description She will demonstrate independence with HEP   Target Date 17   Date Met  17   Progress:     Goal Identifier LTG 2   Goal Description She will be able to walk 1/4 to 1 mile.    Target Date 17   Date Met  17   Progress:     Goal Identifier     Goal Description     Target Date     Date Met      Progress:     Goal Identifier     Goal Description     Target Date     Date Met      Progress:     Goal Identifier     Goal Description     Target Date     Date Met      Progress:     Goal Identifier     Goal Description     Target Date     Date Met      Progress:     Goal Identifier     Goal Description     Target Date     Date Met      Progress:     Progress Toward Goals:   Progress this reporting period: PT goals are met            Plan:  D/C to HEP    Discharge:    Reason for Discharge: Patient has met all goals.    Equipment Issued: none    Discharge Plan: Patient to continue home program.  "

## 2017-12-19 ENCOUNTER — OFFICE VISIT (OUTPATIENT)
Dept: DERMATOLOGY | Facility: OTHER | Age: 62
End: 2017-12-19
Attending: DERMATOLOGY
Payer: MEDICARE

## 2017-12-19 VITALS
DIASTOLIC BLOOD PRESSURE: 70 MMHG | WEIGHT: 150 LBS | SYSTOLIC BLOOD PRESSURE: 110 MMHG | OXYGEN SATURATION: 98 % | HEART RATE: 92 BPM | HEIGHT: 62 IN | BODY MASS INDEX: 27.6 KG/M2

## 2017-12-19 DIAGNOSIS — Z85.828 HISTORY OF BASAL CELL CARCINOMA: ICD-10-CM

## 2017-12-19 DIAGNOSIS — L98.9 SKIN LESION: Primary | ICD-10-CM

## 2017-12-19 DIAGNOSIS — D48.5 NEOPLASM OF UNCERTAIN BEHAVIOR OF SKIN: ICD-10-CM

## 2017-12-19 PROCEDURE — 99213 OFFICE O/P EST LOW 20 MIN: CPT | Mod: 25 | Performed by: DERMATOLOGY

## 2017-12-19 PROCEDURE — 99212 OFFICE O/P EST SF 10 MIN: CPT

## 2017-12-19 PROCEDURE — 88305 TISSUE EXAM BY PATHOLOGIST: CPT | Mod: TC | Performed by: DERMATOLOGY

## 2017-12-19 PROCEDURE — 11100 HC BIOPSY SKIN/SUBQ/MUC MEM, SINGLE LESION: CPT | Performed by: DERMATOLOGY

## 2017-12-19 NOTE — MR AVS SNAPSHOT
After Visit Summary   12/19/2017    Lisa Angeles    MRN: [id number]           Patient Information     Date Of Birth          1955        Visit Information        Provider Department      12/19/2017 11:00 AM CHASITY Bell MD [facility]        Today's Diagnoses     Skin lesion    -  1    History of basal cell carcinoma        Neoplasm of uncertain behavior of skin           Follow-ups after your visit        Your next 10 appointments already scheduled     Gar[place] 15, 2018  1:30 PM CST   (Arrive by 1:15 PM)   Office Visit with Carolyn Ritter MD   [facility]    [address]  [place] [address]   [phone]           Bring a current list of meds and any records pertaining to this visit.  For Physicals, please bring immunization records and any forms needing to be filled out.  Please arrive 15 minutes early to complete paperwork and register.              Who to contact     If you have questions or need follow up information about today's clinic visit or your schedule please contact [facility] directly at [phone].  Normal or non-critical lab and imaging results will be communicated to you by Distillhart, letter or phone within 4 business days after the clinic has received the results. If you do not hear from us within 7 days, please contact the clinic through Distillhart or phone. If you have a critical or abnormal lab result, we will notify you by phone as soon as possible.  Submit refill requests through Newsbound or call your pharmacy and they will forward the refill request to us. Please allow 3 business days for your refill to be completed.          Additional Information About Your Visit        MyChart Information     Newsbound lets you send messages to your doctor, view your test results, renew your prescriptions, schedule appointments and more. To sign up, go to [email or web address] . Click  "on \"Log in\" on the left side of the screen, which will take you to the Welcome page. Then click on \"Sign up Now\" on the right side of the page.     You will be asked to enter the access code listed below, as well as some personal information. Please follow the directions to create your username and password.     Your access code is: [email or web address]  Expires: 1/10/2018  2:17 PM     Your access code will  in 90 days. If you need help or a new code, please call your [facility] clinic or [phone].        Care EveryWhere ID     This is your Care EveryWhere ID. This could be used by other organizations to access your [facility] medical records  [id number]        Your Vitals Were     Pulse Height Pulse Oximetry BMI (Body Mass Index)          92 1.575 m (5' 2\") 98% 27.44 kg/m2         Blood Pressure from Last 3 Encounters:   17 110/70   10/12/17 114/72   17 118/62    Weight from Last 3 Encounters:   17 68 kg (150 lb)   10/12/17 68.3 kg (150 lb 9.6 oz)   17 68.9 kg (152 lb)              We Performed the Following     BIOPSY SKIN/SUBQ/MUC MEM, SINGLE LESION     Surgical pathology exam        Primary Care Provider Office Phone # Fax #    Carolynmartha Ritter [phone] [address] [facility] [address]        Equal Access to Services     [facility]: Hadii rodrick zaman hadasho Sothuyali, waaxda luqadaha, qaybta kaalmada ladi, venita bradshaw . So [facility] [phone].    ATENCIÓN: Si habla edin, tiene a franklin disposición servicios gratuitos de asistencia lingüística. Llame al [phone].    We comply with applicable federal civil rights laws and [place] laws. We do not discriminate on the basis of race, color, national origin, age, disability, sex, sexual orientation, or gender identity.            Thank you!     Thank you for choosing [facility]  for your care. Our goal is always to provide you with excellent care. Hearing " back from our patients is one way we can continue to improve our services. Please take a few minutes to complete the written survey that you may receive in the mail after your visit with us. Thank you!             Your Updated Medication List - Protect others around you: Learn how to safely use, store and throw away your medicines at www.disposemymeds.org.          This list is accurate as of: 12/19/17 11:59 PM.  Always use your most recent med list.                   Brand Name Dispense Instructions for use Diagnosis    acetaminophen 500 MG tablet    ACETAMINOPHEN EXTRA STRENGTH    90 tablet    Take 2 tablets (1,000 mg) by mouth every 8 hours as needed for pain or fever    Pain in shoulder       B Complex-C Caps     100 capsule    Take 1 capsule by mouth daily    Diagnosis unknown       calcium carbonate 500 MG chewable tablet    TUMS    180 tablet    Take 1 tablet (500 mg) by mouth 2 times daily    Lumbago       cholecalciferol 1000 UNIT tablet    vitamin D3    30 tablet    Take 1 tablet (1,000 Units) by mouth daily    Healthcare maintenance       diazepam 5 MG tablet    VALIUM    40 tablet    Take 1-2 tablets (5-10 mg) by mouth every 6 hours as needed for muscle spasms (MUSCLE SPASM)        fish oil-omega-3 fatty acids 1000 MG capsule     90 capsule    Take 3 capsules (3,000 mg) by mouth daily    Hyperlipidemia, unspecified hyperlipidemia type       HYDROcodone-acetaminophen 7.5-325 MG per tablet    NORCO    60 tablet    Take 1 tablet by mouth every 6 hours as needed for moderate to severe pain    Chronic bilateral low back pain with right-sided sciatica       ibuprofen 800 MG tablet    ADVIL/MOTRIN    90 tablet    Take 1 tablet (800 mg) by mouth every 8 hours as needed    Chronic bilateral low back pain with right-sided sciatica       order for DME     1 Device    Equipment being ordered: gamekeeper/thumb spica splint    Radial styloid tenosynovitis       PREMARIN 0.3 MG tablet   Generic drug:  estrogens  (conjugated)     90 tablet    TAKE 1 TABLET BY MOUTH EVERY DAY    Menopausal syndrome (hot flashes)       progesterone 100 MG capsule    PROMETRIUM    90 capsule    TAKE 1 CAPSULE BY MOUTH EVERY DAY    Menopausal syndrome (hot flashes)

## 2017-12-19 NOTE — LETTER
12/19/2017       RE: Lisa Angeles  [address] [place] [address]     Dear Colleague,    Thank you for referring your patient, Lisa Angeles, to the [facility] [place] at [facility]. Please see a copy of my visit note below.    dictated    Again, thank you for allowing me to participate in the care of your patient.      Sincerely,    CHASITY Bell MD

## 2017-12-19 NOTE — NURSING NOTE
"Chief Complaint   Patient presents with     RECHECK     1 yr f/u hx of sk        Initial /70  Pulse 92  Ht 1.575 m (5' 2\")  Wt 68 kg (150 lb)  SpO2 98%  BMI 27.44 kg/m2 Estimated body mass index is 27.44 kg/(m^2) as calculated from the following:    Height as of this encounter: 1.575 m (5' 2\").    Weight as of this encounter: 68 kg (150 lb).  Medication Reconciliation: complete     Astrid Norwood        "

## 2017-12-20 LAB — COPATH REPORT: NORMAL

## 2017-12-20 NOTE — CONSULTS
SUBJECTIVE:  Lisa Simon comes in for a 1 year follow-up.  She has had a history of skin cancer on her forehead, seborrheic keratoses and other issues.  Today, she is concerned about a lesion on her right ear.      OBJECTIVE:  The ear lesion  appears to be simply an actinic keratosis.  She has another lesion on her left  cheek that is concerning.  It appears to be possibly a very early basal cell or versus a sebaceous hyperplasia lesion with a somewhat whitish appearance, a central pore or erosions measuring roughly 3 mm.  On her right breast is the fading seborrheic keratosis that we checked last visit.  We checked her back, her chest, her face and neck carefully and saw no other lesions, other than those mentioned.        ASSESSMENT:  Possible basal cell, left cheek, seborrheic keratoses, actinic keratosis.      PLAN:  The cheek lesion was anesthetized, shave removed and submitted.  The base was very gently curetted.  If this is a sebaceous hyperplasia lesion, we should be done.  If it is a basal cell, we will have her return, however.  Meds and allergies reviewed.  Return pending the report of the left cheek lesion.         CHASITY SMITH MD             D: 2017 18:09   T: 2017 22:14   MT: [place]      Name:     LISA WEI   MRN:      [id number]        Account:       [id number]   :      1955           Consult Date:  2017      Document: [id number]       cc: Carolyn Marx MD

## 2017-12-22 ENCOUNTER — TELEPHONE (OUTPATIENT)
Dept: DERMATOLOGY | Facility: OTHER | Age: 62
End: 2017-12-22

## 2017-12-22 NOTE — TELEPHONE ENCOUNTER
Reason for call:  RESULTS    1. What is the test that was ordered? Skin bx  2. Who ordered your test? Dr Bell  3. When was the test performed?  12/19/17  Description: Pt called to see if bx results were back? Please call her back at [phone]  Was an appointment offered for this a call? No  If Yes :  Appointment type                 Date    Preferred method for responding to this message: Telephone Call  What is your phone number ?    If we cannot reach you directly, may we leave a detailed response at the number you provided? Yes  Can this message wait until your PCP/Provider returns if not available today? No, provider out    12-26-17   I called Lisa ni and left a message on her phone recorder. The lesion from the left cheek was a benign one.  There was some mild inflammation and some evidence of sun damage but no skin cancer or anything serious.  I had passed this message to one of the nurses and that was to be relayed to Lisa but apparently that didn't happen.    BRYN Bell M.D.

## 2017-12-28 ENCOUNTER — TELEPHONE (OUTPATIENT)
Dept: FAMILY MEDICINE | Facility: OTHER | Age: 62
End: 2017-12-28

## 2018-01-03 DIAGNOSIS — N95.1 MENOPAUSAL SYNDROME (HOT FLASHES): ICD-10-CM

## 2018-01-05 RX ORDER — CONJUGATED ESTROGENS 0.3 MG/1
TABLET, FILM COATED ORAL
Qty: 90 TABLET | Refills: 1 | Status: SHIPPED | OUTPATIENT
Start: 2018-01-05 | End: 2018-07-06

## 2018-01-15 ENCOUNTER — OFFICE VISIT (OUTPATIENT)
Dept: FAMILY MEDICINE | Facility: OTHER | Age: 63
End: 2018-01-15
Attending: FAMILY MEDICINE
Payer: COMMERCIAL

## 2018-01-15 VITALS
TEMPERATURE: 96.7 F | DIASTOLIC BLOOD PRESSURE: 68 MMHG | RESPIRATION RATE: 14 BRPM | HEART RATE: 72 BPM | HEIGHT: 60 IN | WEIGHT: 154 LBS | OXYGEN SATURATION: 98 % | BODY MASS INDEX: 30.23 KG/M2 | SYSTOLIC BLOOD PRESSURE: 124 MMHG

## 2018-01-15 DIAGNOSIS — E78.5 HYPERLIPIDEMIA, UNSPECIFIED HYPERLIPIDEMIA TYPE: ICD-10-CM

## 2018-01-15 DIAGNOSIS — Z00.00 ROUTINE GENERAL MEDICAL EXAMINATION AT A HEALTH CARE FACILITY: Primary | ICD-10-CM

## 2018-01-15 DIAGNOSIS — Z23 NEED FOR PROPHYLACTIC VACCINATION AND INOCULATION AGAINST INFLUENZA: ICD-10-CM

## 2018-01-15 DIAGNOSIS — Z01.419 ENCOUNTER FOR GYNECOLOGICAL EXAMINATION WITHOUT ABNORMAL FINDING: ICD-10-CM

## 2018-01-15 PROCEDURE — G0123 SCREEN CERV/VAG THIN LAYER: HCPCS | Mod: ZL | Performed by: FAMILY MEDICINE

## 2018-01-15 PROCEDURE — 99396 PREV VISIT EST AGE 40-64: CPT | Performed by: FAMILY MEDICINE

## 2018-01-15 PROCEDURE — G0463 HOSPITAL OUTPT CLINIC VISIT: HCPCS

## 2018-01-15 PROCEDURE — 99000 SPECIMEN HANDLING OFFICE-LAB: CPT | Performed by: FAMILY MEDICINE

## 2018-01-15 PROCEDURE — 87624 HPV HI-RISK TYP POOLED RSLT: CPT | Mod: ZL | Performed by: FAMILY MEDICINE

## 2018-01-15 PROCEDURE — 90686 IIV4 VACC NO PRSV 0.5 ML IM: CPT | Performed by: FAMILY MEDICINE

## 2018-01-15 PROCEDURE — G0476 HPV COMBO ASSAY CA SCREEN: HCPCS | Mod: ZL | Performed by: FAMILY MEDICINE

## 2018-01-15 PROCEDURE — G0008 ADMIN INFLUENZA VIRUS VAC: HCPCS | Performed by: FAMILY MEDICINE

## 2018-01-15 ASSESSMENT — ANXIETY QUESTIONNAIRES
2. NOT BEING ABLE TO STOP OR CONTROL WORRYING: SEVERAL DAYS
5. BEING SO RESTLESS THAT IT IS HARD TO SIT STILL: NOT AT ALL
7. FEELING AFRAID AS IF SOMETHING AWFUL MIGHT HAPPEN: NOT AT ALL
GAD7 TOTAL SCORE: 3
IF YOU CHECKED OFF ANY PROBLEMS ON THIS QUESTIONNAIRE, HOW DIFFICULT HAVE THESE PROBLEMS MADE IT FOR YOU TO DO YOUR WORK, TAKE CARE OF THINGS AT HOME, OR GET ALONG WITH OTHER PEOPLE: SOMEWHAT DIFFICULT
1. FEELING NERVOUS, ANXIOUS, OR ON EDGE: NOT AT ALL
6. BECOMING EASILY ANNOYED OR IRRITABLE: SEVERAL DAYS
3. WORRYING TOO MUCH ABOUT DIFFERENT THINGS: SEVERAL DAYS

## 2018-01-15 ASSESSMENT — PATIENT HEALTH QUESTIONNAIRE - PHQ9
5. POOR APPETITE OR OVEREATING: NOT AT ALL
SUM OF ALL RESPONSES TO PHQ QUESTIONS 1-9: 6

## 2018-01-15 NOTE — MR AVS SNAPSHOT
After Visit Summary   1/15/2018    Lisa Angeles    MRN: [id number]           Patient Information     Date Of Birth          1955        Visit Information        Provider Department      1/15/2018 1:30 PM Carolyn Ritter MD [facility]        Today's Diagnoses     Routine general medical examination at a health care facility    -  1    Encounter for gynecological examination without abnormal finding        Hyperlipidemia, unspecified hyperlipidemia type        Need for prophylactic vaccination and inoculation against influenza          Care Instructions      Preventive Health Recommendations  Female Ages 50 - 64    Yearly exam: See your health care provider every year in order to  o Review health changes.   o Discuss preventive care.    o Review your medicines if your doctor has prescribed any.      Get a Pap test every three years (unless you have an abnormal result and your provider advises testing more often).    If you get Pap tests with HPV test, you only need to test every 5 years, unless you have an abnormal result.     You do not need a Pap test if your uterus was removed (hysterectomy) and you have not had cancer.    You should be tested each year for STDs (sexually transmitted diseases) if you're at risk.     Have a mammogram every 1 to 2 years.    Have a colonoscopy at age 50, or have a yearly FIT test (stool test). These exams screen for colon cancer.      Have a cholesterol test every 5 years, or more often if advised.    Have a diabetes test (fasting glucose) every three years. If you are at risk for diabetes, you should have this test more often.     If you are at risk for osteoporosis (brittle bone disease), think about having a bone density scan (DEXA).    Shots: Get a flu shot each year. Get a tetanus shot every 10 years.    Nutrition:     Eat at least 5 servings of fruits and vegetables each day.    Eat whole-grain bread, whole-wheat pasta and brown rice  "instead of white grains and rice.    Talk to your provider about Calcium and Vitamin D.     Lifestyle    Exercise at least 150 minutes a week (30 minutes a day, 5 days a week). This will help you control your weight and prevent disease.    Limit alcohol to one drink per day.    No smoking.     Wear sunscreen to prevent skin cancer.     See your dentist every six months for an exam and cleaning.    See your eye doctor every 1 to 2 years.            Follow-ups after your visit        Follow-up notes from your care team     Return in about 6 months (around 7/15/2018).      Future tests that were ordered for you today     Open Future Orders        Priority Expected Expires Ordered    Lipid Profile Routine  1/15/2019 1/15/2018            Who to contact     If you have questions or need follow up information about today's clinic visit or your schedule please contact [facility] directly at [phone].  Normal or non-critical lab and imaging results will be communicated to you by MyChart, letter or phone within 4 business days after the clinic has received the results. If you do not hear from us within 7 days, please contact the clinic through [employer] or phone. If you have a critical or abnormal lab result, we will notify you by phone as soon as possible.  Submit refill requests through [employer] or call your pharmacy and they will forward the refill request to us. Please allow 3 business days for your refill to be completed.          Additional Information About Your Visit        [employer] Information     [employer] lets you send messages to your doctor, view your test results, renew your prescriptions, schedule appointments and more. To sign up, go to [email or web address] . Click on \"Log in\" on the left side of the screen, which will take you to the Welcome page. Then click on \"Sign up Now\" on the right side of the page.     You will be asked to enter the access code listed below, as well as some personal " information. Please follow the directions to create your username and password.     Your access code is: [email or web address]  Expires: 4/15/2018  4:25 PM     Your access code will  in 90 days. If you need help or a new code, please call your [facility] clinic or [phone].        Care EveryWhere ID     This is your Care EveryWhere ID. This could be used by other organizations to access your [facility] medical records  [id number]        Your Vitals Were     Pulse Temperature Respirations Height Pulse Oximetry BMI (Body Mass Index)    72 96.7  F (35.9  C) (Tympanic) 14 5' (1.524 m) 98% 30.08 kg/m2       Blood Pressure from Last 3 Encounters:   01/15/18 124/68   17 110/70   10/12/17 114/72    Weight from Last 3 Encounters:   01/15/18 154 lb (69.9 kg)   17 150 lb (68 kg)   10/12/17 150 lb 9.6 oz (68.3 kg)              We Performed the Following     A pap thin layer screen with  HPV - recommended age 30 - 65 years (select HPV order below)     FLU VAC, SPLIT VIRUS IM > 3 YO (QUADRIVALENT) [22962]     HPV High Risk Types DNA Cervical     Vaccine Administration, Initial [[id number]]        Primary Care Provider Office Phone # Fax #    Carolyn Ritter [phone] [address] [facility] [address]        Equal Access to Services     ERICA BRADFORD AH: Hadii rodrick ferrer Sobharat, waaxda luqadaha, qaybta kaalmavenita boykin. So [facility] [phone].    ATENCIÓN: Si habla español, tiene a franklin disposición servicios gratuitos de asistencia lingüística. Llame al [phone].    We comply with applicable federal civil rights laws and [place] laws. We do not discriminate on the basis of race, color, national origin, age, disability, sex, sexual orientation, or gender identity.            Thank you!     Thank you for choosing [facility]  for your care. Our goal is always to provide you with excellent care. Hearing back from our  patients is one way we can continue to improve our services. Please take a few minutes to complete the written survey that you may receive in the mail after your visit with us. Thank you!             Your Updated Medication List - Protect others around you: Learn how to safely use, store and throw away your medicines at www.disposemymeds.org.          This list is accurate as of: 1/15/18  4:25 PM.  Always use your most recent med list.                   Brand Name Dispense Instructions for use Diagnosis    acetaminophen 500 MG tablet    ACETAMINOPHEN EXTRA STRENGTH    90 tablet    Take 2 tablets (1,000 mg) by mouth every 8 hours as needed for pain or fever    Pain in shoulder       B Complex-C Caps     100 capsule    Take 1 capsule by mouth daily    Diagnosis unknown       calcium carbonate 500 MG chewable tablet    TUMS    180 tablet    Take 1 tablet (500 mg) by mouth 2 times daily    Lumbago       cholecalciferol 1000 UNIT tablet    vitamin D3    30 tablet    Take 1 tablet (1,000 Units) by mouth daily    Healthcare maintenance       diazepam 5 MG tablet    VALIUM    40 tablet    Take 1-2 tablets (5-10 mg) by mouth every 6 hours as needed for muscle spasms (MUSCLE SPASM)        fish oil-omega-3 fatty acids 1000 MG capsule     90 capsule    Take 3 capsules (3,000 mg) by mouth daily    Hyperlipidemia, unspecified hyperlipidemia type       HYDROcodone-acetaminophen 7.5-325 MG per tablet    NORCO    60 tablet    Take 1 tablet by mouth every 6 hours as needed for moderate to severe pain    Chronic bilateral low back pain with right-sided sciatica       ibuprofen 800 MG tablet    ADVIL/MOTRIN    90 tablet    Take 1 tablet (800 mg) by mouth every 8 hours as needed    Chronic bilateral low back pain with right-sided sciatica       order for DME     1 Device    Equipment being ordered: gamekeeper/thumb spica splint    Radial styloid tenosynovitis       PREMARIN 0.3 MG tablet   Generic drug:  estrogens (conjugated)     90  tablet    TAKE 1 TABLET BY MOUTH EVERY DAY    Menopausal syndrome (hot flashes)       progesterone 100 MG capsule    PROMETRIUM    90 capsule    TAKE 1 CAPSULE BY MOUTH EVERY DAY    Menopausal syndrome (hot flashes)
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
## 2018-01-15 NOTE — NURSING NOTE
Chief Complaint   Patient presents with     Physical       Initial /68 (BP Location: Left arm, Patient Position: Sitting, Cuff Size: Adult Regular)  Pulse 72  Temp 96.7  F (35.9  C) (Tympanic)  Resp 14  Ht 5' (1.524 m)  Wt 154 lb (69.9 kg)  SpO2 98%  BMI 30.08 kg/m2 Estimated body mass index is 30.08 kg/(m^2) as calculated from the following:    Height as of this encounter: 5' (1.524 m).    Weight as of this encounter: 154 lb (69.9 kg).  Medication Reconciliation: complete   Agatha Montgomery

## 2018-01-16 ASSESSMENT — ANXIETY QUESTIONNAIRES: GAD7 TOTAL SCORE: 3

## 2018-01-17 DIAGNOSIS — E78.5 HYPERLIPIDEMIA, UNSPECIFIED HYPERLIPIDEMIA TYPE: ICD-10-CM

## 2018-01-17 LAB
CHOLEST SERPL-MCNC: 252 MG/DL
HDLC SERPL-MCNC: 89 MG/DL
LDLC SERPL CALC-MCNC: 133 MG/DL
NONHDLC SERPL-MCNC: 163 MG/DL
TRIGL SERPL-MCNC: 149 MG/DL

## 2018-01-17 PROCEDURE — 80061 LIPID PANEL: CPT | Mod: ZL | Performed by: FAMILY MEDICINE

## 2018-01-17 PROCEDURE — 36415 COLL VENOUS BLD VENIPUNCTURE: CPT | Mod: ZL | Performed by: FAMILY MEDICINE

## 2018-01-19 LAB
COPATH REPORT: NORMAL
PAP: NORMAL

## 2018-01-22 LAB
FINAL DIAGNOSIS: NORMAL
HPV HR 12 DNA CVX QL NAA+PROBE: NEGATIVE
HPV16 DNA SPEC QL NAA+PROBE: NEGATIVE
HPV18 DNA SPEC QL NAA+PROBE: NEGATIVE
SPECIMEN DESCRIPTION: NORMAL
SPECIMEN SOURCE CVX/VAG CYTO: NORMAL

## 2018-03-06 DIAGNOSIS — E78.5 HYPERLIPIDEMIA, UNSPECIFIED HYPERLIPIDEMIA TYPE: ICD-10-CM

## 2018-03-06 RX ORDER — CHLORAL HYDRATE 500 MG
3000 CAPSULE ORAL DAILY
Qty: 90 CAPSULE | Refills: 4 | Status: SHIPPED | OUTPATIENT
Start: 2018-03-06 | End: 2018-03-21

## 2018-03-06 NOTE — TELEPHONE ENCOUNTER
Omega-3 Fatty Acids (OMEGA-3 FISH OIL) 1000 MG CAPS    Last Written Prescription Date:  4/12/17  Last Fill Quantity: 90,   # refills: 10  Last Office Visit: 1/15/18  Future Office visit:

## 2018-03-15 DIAGNOSIS — E78.5 HYPERLIPIDEMIA, UNSPECIFIED HYPERLIPIDEMIA TYPE: ICD-10-CM

## 2018-03-16 RX ORDER — CHLORAL HYDRATE 500 MG
3000 CAPSULE ORAL DAILY
Qty: 90 CAPSULE | Refills: 4 | OUTPATIENT
Start: 2018-03-16

## 2018-03-21 DIAGNOSIS — E78.5 HYPERLIPIDEMIA, UNSPECIFIED HYPERLIPIDEMIA TYPE: ICD-10-CM

## 2018-03-21 RX ORDER — CHLORAL HYDRATE 500 MG
3000 CAPSULE ORAL DAILY
Qty: 90 CAPSULE | Refills: 4 | Status: SHIPPED | OUTPATIENT
Start: 2018-03-21 | End: 2018-03-26

## 2018-03-26 ENCOUNTER — OFFICE VISIT (OUTPATIENT)
Dept: FAMILY MEDICINE | Facility: OTHER | Age: 63
End: 2018-03-26
Attending: FAMILY MEDICINE
Payer: COMMERCIAL

## 2018-03-26 VITALS
DIASTOLIC BLOOD PRESSURE: 70 MMHG | HEIGHT: 60 IN | HEART RATE: 70 BPM | OXYGEN SATURATION: 98 % | BODY MASS INDEX: 30.63 KG/M2 | WEIGHT: 156 LBS | RESPIRATION RATE: 14 BRPM | SYSTOLIC BLOOD PRESSURE: 126 MMHG | TEMPERATURE: 97.9 F

## 2018-03-26 DIAGNOSIS — L08.9 LOCAL INFECTION OF SKIN AND SUBCUTANEOUS TISSUE: Primary | ICD-10-CM

## 2018-03-26 DIAGNOSIS — E78.5 HYPERLIPIDEMIA, UNSPECIFIED HYPERLIPIDEMIA TYPE: ICD-10-CM

## 2018-03-26 PROCEDURE — G0463 HOSPITAL OUTPT CLINIC VISIT: HCPCS

## 2018-03-26 PROCEDURE — 99213 OFFICE O/P EST LOW 20 MIN: CPT | Performed by: FAMILY MEDICINE

## 2018-03-26 RX ORDER — CHLORAL HYDRATE 500 MG
3000 CAPSULE ORAL DAILY
Qty: 100 CAPSULE | Refills: 5 | Status: SHIPPED | OUTPATIENT
Start: 2018-03-26 | End: 2018-08-09

## 2018-03-26 RX ORDER — CEPHALEXIN 500 MG/1
500 CAPSULE ORAL 3 TIMES DAILY
Qty: 21 CAPSULE | Refills: 0 | Status: SHIPPED | OUTPATIENT
Start: 2018-03-26 | End: 2019-02-08

## 2018-03-26 ASSESSMENT — ANXIETY QUESTIONNAIRES
GAD7 TOTAL SCORE: 1
3. WORRYING TOO MUCH ABOUT DIFFERENT THINGS: NOT AT ALL
5. BEING SO RESTLESS THAT IT IS HARD TO SIT STILL: NOT AT ALL
6. BECOMING EASILY ANNOYED OR IRRITABLE: SEVERAL DAYS
2. NOT BEING ABLE TO STOP OR CONTROL WORRYING: NOT AT ALL
1. FEELING NERVOUS, ANXIOUS, OR ON EDGE: NOT AT ALL
7. FEELING AFRAID AS IF SOMETHING AWFUL MIGHT HAPPEN: NOT AT ALL
IF YOU CHECKED OFF ANY PROBLEMS ON THIS QUESTIONNAIRE, HOW DIFFICULT HAVE THESE PROBLEMS MADE IT FOR YOU TO DO YOUR WORK, TAKE CARE OF THINGS AT HOME, OR GET ALONG WITH OTHER PEOPLE: NOT DIFFICULT AT ALL

## 2018-03-26 ASSESSMENT — PATIENT HEALTH QUESTIONNAIRE - PHQ9: 5. POOR APPETITE OR OVEREATING: NOT AT ALL

## 2018-03-26 NOTE — MR AVS SNAPSHOT
"              After Visit Summary   3/26/2018    Lisa Angeles    MRN: [id number]           Patient Information     Date Of Birth          1955        Visit Information        Provider Department      3/26/2018 9:30 AM Carolyn Ritter MD [facility] (formerly [facility])        Today's Diagnoses     Local infection of skin and subcutaneous tissue    -  1    Hyperlipidemia, unspecified hyperlipidemia type           Follow-ups after your visit        Follow-up notes from your care team     Return if symptoms worsen or fail to improve.      Who to contact     If you have questions or need follow up information about today's clinic visit or your schedule please contact [facility] directly at [phone].  Normal or non-critical lab and imaging results will be communicated to you by MyChart, letter or phone within 4 business days after the clinic has received the results. If you do not hear from us within 7 days, please contact the clinic through MyChart or phone. If you have a critical or abnormal lab result, we will notify you by phone as soon as possible.  Submit refill requests through [employer] or call your pharmacy and they will forward the refill request to us. Please allow 3 business days for your refill to be completed.          Additional Information About Your Visit        MyChart Information     [employer] lets you send messages to your doctor, view your test results, renew your prescriptions, schedule appointments and more. To sign up, go to [email or web address] . Click on \"Log in\" on the left side of the screen, which will take you to the Welcome page. Then click on \"Sign up Now\" on the right side of the page.     You will be asked to enter the access code listed below, as well as some personal information. Please follow the directions to create your username and password.     Your access code is: [email or web address]  Expires: 4/15/2018  5:25 PM     Your access code will  in 90 days. If you " need help or a new code, please call your [place] clinic or [phone].        Care EveryWhere ID     This is your Care EveryWhere ID. This could be used by other organizations to access your [place] medical records  [id number]        Your Vitals Were     Pulse Temperature Respirations Height Pulse Oximetry BMI (Body Mass Index)    70 97.9  F (36.6  C) (Tympanic) 14 5' (1.524 m) 98% 30.47 kg/m2       Blood Pressure from Last 3 Encounters:   03/26/18 126/70   01/15/18 124/68   12/19/17 110/70    Weight from Last 3 Encounters:   03/26/18 156 lb (70.8 kg)   01/15/18 154 lb (69.9 kg)   12/19/17 150 lb (68 kg)              Today, you had the following     No orders found for display         Today's Medication Changes          These changes are accurate as of 3/26/18  1:18 PM.  If you have any questions, ask your nurse or doctor.               Start taking these medicines.        Dose/Directions    cephALEXin 500 MG capsule   Commonly known as:  KEFLEX   Used for:  Local infection of skin and subcutaneous tissue   Started by:  Carolyn Ritter MD        Dose:  500 mg   Take 1 capsule (500 mg) by mouth 3 times daily for 7 days   Quantity:  21 capsule   Refills:  0            Where to get your medicines      These medications were sent to [facility] [id number] [place] [address]  AT [facility] [address]  [address] [facility] [address]     Phone:  [phone]     cephALEXin 500 MG capsule         Some of these will need a paper prescription and others can be bought over the counter.  Ask your nurse if you have questions.     Bring a paper prescription for each of these medications     fish oil-omega-3 fatty acids 1000 MG capsule                Primary Care Provider Office Phone # Fax #    Carolyn Ritter [phone] [address] [facility] [address]        Equal Access to Services     RUSS BRADFORD AH: Carola White,  wajackda janineadaha, qaybta katerra bledsoe, venita danilesonaateresita ah. So [facility] [phone].    ATENCIÓN: Si kassy jesus, tiene a franklin disposición servicios gratuitos de asistencia lingüística. Camila al [phone].    We comply with applicable federal civil rights laws and [place] laws. We do not discriminate on the basis of race, color, national origin, age, disability, sex, sexual orientation, or gender identity.            Thank you!     Thank you for choosing [facility]  for your care. Our goal is always to provide you with excellent care. Hearing back from our patients is one way we can continue to improve our services. Please take a few minutes to complete the written survey that you may receive in the mail after your visit with us. Thank you!             Your Updated Medication List - Protect others around you: Learn how to safely use, store and throw away your medicines at www.disposemymeds.org.          This list is accurate as of 3/26/18  1:18 PM.  Always use your most recent med list.                   Brand Name Dispense Instructions for use Diagnosis    acetaminophen 500 MG tablet    ACETAMINOPHEN EXTRA STRENGTH    90 tablet    Take 2 tablets (1,000 mg) by mouth every 8 hours as needed for pain or fever    Pain in shoulder       B Complex-C Caps     100 capsule    Take 1 capsule by mouth daily    Diagnosis unknown       calcium carbonate 500 MG chewable tablet    TUMS    180 tablet    Take 1 tablet (500 mg) by mouth 2 times daily    Lumbago       cephALEXin 500 MG capsule    KEFLEX    21 capsule    Take 1 capsule (500 mg) by mouth 3 times daily for 7 days    Local infection of skin and subcutaneous tissue       cholecalciferol 1000 UNIT tablet    vitamin D3    30 tablet    Take 1 tablet (1,000 Units) by mouth daily    Healthcare maintenance       diazepam 5 MG tablet    VALIUM    40 tablet    Take 1-2 tablets (5-10 mg) by mouth every 6 hours as needed for muscle spasms  (MUSCLE SPASM)        fish oil-omega-3 fatty acids 1000 MG capsule     100 capsule    Take 3 capsules (3,000 mg) by mouth daily    Hyperlipidemia, unspecified hyperlipidemia type       HYDROcodone-acetaminophen 7.5-325 MG per tablet    NORCO    60 tablet    Take 1 tablet by mouth every 6 hours as needed for moderate to severe pain    Chronic bilateral low back pain with right-sided sciatica       ibuprofen 800 MG tablet    ADVIL/MOTRIN    90 tablet    Take 1 tablet (800 mg) by mouth every 8 hours as needed    Chronic bilateral low back pain with right-sided sciatica       order for DME     1 Device    Equipment being ordered: gamekeeper/thumb spica splint    Radial styloid tenosynovitis       PREMARIN 0.3 MG tablet   Generic drug:  estrogens (conjugated)     90 tablet    TAKE 1 TABLET BY MOUTH EVERY DAY    Menopausal syndrome (hot flashes)       progesterone 100 MG capsule    PROMETRIUM    90 capsule    TAKE 1 CAPSULE BY MOUTH EVERY DAY    Menopausal syndrome (hot flashes)

## 2018-03-26 NOTE — NURSING NOTE
Chief Complaint   Patient presents with     Insect Bites     Other     Snap fitness renewal       Initial /70 (BP Location: Left arm, Patient Position: Sitting, Cuff Size: Adult Regular)  Pulse 70  Temp 97.9  F (36.6  C) (Tympanic)  Resp 14  Ht 5' (1.524 m)  Wt 156 lb (70.8 kg)  SpO2 98%  BMI 30.47 kg/m2 Estimated body mass index is 30.47 kg/(m^2) as calculated from the following:    Height as of this encounter: 5' (1.524 m).    Weight as of this encounter: 156 lb (70.8 kg).  Medication Reconciliation: complete   Agatha Montgomery
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
## 2018-03-27 ASSESSMENT — ANXIETY QUESTIONNAIRES: GAD7 TOTAL SCORE: 1

## 2018-03-27 ASSESSMENT — PATIENT HEALTH QUESTIONNAIRE - PHQ9: SUM OF ALL RESPONSES TO PHQ QUESTIONS 1-9: 4

## 2018-06-07 DIAGNOSIS — M54.50 LUMBAGO: ICD-10-CM

## 2018-06-07 NOTE — TELEPHONE ENCOUNTER
Calcium Antacid  Last Written Prescription Date: 7/31/17  Last Fill Quantity: 180 # of Refills: 3  Last Office Visit: 3/26/18

## 2018-06-08 RX ORDER — CALCIUM CARBONATE 500 MG/1
TABLET, CHEWABLE ORAL
Qty: 180 TABLET | Refills: 0 | Status: SHIPPED | OUTPATIENT
Start: 2018-06-08 | End: 2018-08-13

## 2018-06-27 ENCOUNTER — TELEPHONE (OUTPATIENT)
Dept: FAMILY MEDICINE | Facility: OTHER | Age: 63
End: 2018-06-27

## 2018-06-27 NOTE — TELEPHONE ENCOUNTER
Patient left voicemail that she needs a 3 month RX. for gym membership d/t back pain with sciatica. She will  at  when ready.Thank you.

## 2018-07-02 DIAGNOSIS — G89.29 CHRONIC BILATERAL LOW BACK PAIN WITH RIGHT-SIDED SCIATICA: ICD-10-CM

## 2018-07-02 DIAGNOSIS — M54.30 SCIATICA, UNSPECIFIED LATERALITY: Primary | ICD-10-CM

## 2018-07-02 DIAGNOSIS — M54.41 CHRONIC BILATERAL LOW BACK PAIN WITH RIGHT-SIDED SCIATICA: ICD-10-CM

## 2018-07-02 NOTE — TELEPHONE ENCOUNTER
Called patient and she is requesting Norco and Valium.This is for workers compensation. Per workers compensation staff  the RX's have to say this on them.Last office visit on 3.26.18..    Controlled Substance Refill Request for Norco and Valium      Problem List Complete:  No     PROVIDER TO CONSIDER COMPLETION OF PROBLEM LIST AND OVERVIEW/CONTROLLED SUBSTANCE AGREEMENT    Last Written Prescription Date:      Norco-Last filled on 10.12.17 #60.    Valium last filled on 7.28.17 #40 with 1 refill    Last Office Visit with [facility] primary care provider:     Future Office visit: 3.26.18    Controlled substance agreement on file: No.     Processing:  Patient will  in clinic   checked in past 3 months?  No, route to RN

## 2018-07-02 NOTE — TELEPHONE ENCOUNTER
Reason for call:  Medication    1. Medication Name? Pt didn't state  2. Is this request for a refill? Yes  3. What Pharmacy do you use?   4. Have you contacted your pharmacy? No    5. If yes, when?  (Please note that the turn-around-time for prescriptions is 72 business hours; I am sending your request at this time. SEND TO  [facility]  )  Description: Pt stated that she needs refills on medication but it's through workers comp. Pt doesn't know if they need an appt. Or if it can just be refilled.  Was an appointment offered for this a call? No   Preferred method for responding to this messageTelephone Call [phone]  If we cannot reach you directly, may we leave a detailed response at the number you provided? Yes  Can this message wait until your PCP/Provider returns if not available today? yes

## 2018-07-03 RX ORDER — DIAZEPAM 5 MG
5-10 TABLET ORAL EVERY 6 HOURS PRN
Qty: 40 TABLET | Refills: 0 | Status: SHIPPED | OUTPATIENT
Start: 2018-07-03 | End: 2019-02-05

## 2018-07-03 RX ORDER — HYDROCODONE BITARTRATE AND ACETAMINOPHEN 7.5; 325 MG/1; MG/1
1 TABLET ORAL EVERY 6 HOURS PRN
Qty: 60 TABLET | Refills: 0 | Status: SHIPPED | OUTPATIENT
Start: 2018-07-03 | End: 2019-02-05

## 2018-07-06 DIAGNOSIS — R69 DIAGNOSIS UNKNOWN: ICD-10-CM

## 2018-07-06 DIAGNOSIS — N95.1 MENOPAUSAL SYNDROME (HOT FLASHES): ICD-10-CM

## 2018-07-06 DIAGNOSIS — Z00.00 HEALTHCARE MAINTENANCE: ICD-10-CM

## 2018-07-09 RX ORDER — CONJUGATED ESTROGENS 0.3 MG/1
TABLET, FILM COATED ORAL
Qty: 90 TABLET | Refills: 0 | Status: SHIPPED | OUTPATIENT
Start: 2018-07-09 | End: 2018-08-09

## 2018-07-09 RX ORDER — CHOLECALCIFEROL (VITAMIN D3) 25 MCG
TABLET ORAL
Qty: 30 TABLET | Refills: 8 | Status: SHIPPED | OUTPATIENT
Start: 2018-07-09 | End: 2020-02-11

## 2018-07-09 RX ORDER — B-COMPLEX WITH VITAMIN C
1 CAPSULE ORAL DAILY
Qty: 100 CAPSULE | Refills: 3 | Status: SHIPPED | OUTPATIENT
Start: 2018-07-09 | End: 2020-02-11

## 2018-07-09 NOTE — TELEPHONE ENCOUNTER
premarin      Last Written Prescription Date:  1/5/18  Last Fill Quantity: 90,   # refills: 1  Last Office Visit: 3/26/18  Future Office visit:  none     B complex      Last Written Prescription Date:  6/29/17  Last Fill Quantity: 100,   # refills: 3  Last Office Visit: 3/26/18      Vitamin D3      Last Written Prescription Date:  6/29/17  Last Fill Quantity: 30,   # refills: 11  Last Office Visit: 3/26/18

## 2018-08-13 DIAGNOSIS — M54.50 LUMBAGO: ICD-10-CM

## 2018-08-14 RX ORDER — CALCIUM CARBONATE 500 MG/1
TABLET, CHEWABLE ORAL
Qty: 180 TABLET | Refills: 1 | Status: SHIPPED | OUTPATIENT
Start: 2018-08-14 | End: 2020-02-11

## 2018-08-20 ENCOUNTER — HOSPITAL ENCOUNTER (EMERGENCY)
Facility: HOSPITAL | Age: 63
Discharge: HOME | End: 2018-08-20
Attending: EMERGENCY MEDICINE | Admitting: EMERGENCY MEDICINE
Payer: MEDICARE

## 2018-08-20 ENCOUNTER — TELEPHONE (OUTPATIENT)
Dept: FAMILY MEDICINE | Facility: OTHER | Age: 63
End: 2018-08-20

## 2018-08-20 VITALS
TEMPERATURE: 99 F | SYSTOLIC BLOOD PRESSURE: 135 MMHG | DIASTOLIC BLOOD PRESSURE: 72 MMHG | OXYGEN SATURATION: 96 % | RESPIRATION RATE: 16 BRPM

## 2018-08-20 DIAGNOSIS — I10 BENIGN ESSENTIAL HYPERTENSION: ICD-10-CM

## 2018-08-20 DIAGNOSIS — R06.02 SOB (SHORTNESS OF BREATH): ICD-10-CM

## 2018-08-20 DIAGNOSIS — I10 BENIGN ESSENTIAL HYPERTENSION: Primary | ICD-10-CM

## 2018-08-20 LAB
ALBUMIN SERPL-MCNC: 4 G/DL (ref 3.4–5)
ALBUMIN UR-MCNC: NEGATIVE MG/DL
ALP SERPL-CCNC: 79 U/L (ref 40–150)
ALT SERPL W P-5'-P-CCNC: 31 U/L (ref 0–50)
ANION GAP SERPL CALCULATED.3IONS-SCNC: 7 MMOL/L (ref 3–14)
APPEARANCE UR: CLEAR
AST SERPL W P-5'-P-CCNC: 17 U/L (ref 0–45)
BACTERIA #/AREA URNS HPF: ABNORMAL /HPF
BASOPHILS # BLD AUTO: 0.1 10E9/L (ref 0–0.2)
BASOPHILS NFR BLD AUTO: 0.6 %
BILIRUB SERPL-MCNC: 0.3 MG/DL (ref 0.2–1.3)
BILIRUB UR QL STRIP: NEGATIVE
BUN SERPL-MCNC: 19 MG/DL (ref 7–30)
CALCIUM SERPL-MCNC: 9 MG/DL (ref 8.5–10.1)
CHLORIDE SERPL-SCNC: 106 MMOL/L (ref 94–109)
CO2 SERPL-SCNC: 28 MMOL/L (ref 20–32)
COLOR UR AUTO: ABNORMAL
CREAT SERPL-MCNC: 0.73 MG/DL (ref 0.52–1.04)
DIFFERENTIAL METHOD BLD: NORMAL
EOSINOPHIL # BLD AUTO: 0.1 10E9/L (ref 0–0.7)
EOSINOPHIL NFR BLD AUTO: 1.5 %
ERYTHROCYTE [DISTWIDTH] IN BLOOD BY AUTOMATED COUNT: 12.8 % (ref 10–15)
GFR SERPL CREATININE-BSD FRML MDRD: 80 ML/MIN/1.7M2
GLUCOSE SERPL-MCNC: 97 MG/DL (ref 70–99)
GLUCOSE UR STRIP-MCNC: NEGATIVE MG/DL
HCT VFR BLD AUTO: 43.2 % (ref 35–47)
HGB BLD-MCNC: 15 G/DL (ref 11.7–15.7)
HGB UR QL STRIP: NEGATIVE
IMM GRANULOCYTES # BLD: 0 10E9/L (ref 0–0.4)
IMM GRANULOCYTES NFR BLD: 0.2 %
KETONES UR STRIP-MCNC: NEGATIVE MG/DL
LEUKOCYTE ESTERASE UR QL STRIP: NEGATIVE
LYMPHOCYTES # BLD AUTO: 2.6 10E9/L (ref 0.8–5.3)
LYMPHOCYTES NFR BLD AUTO: 30.1 %
MAGNESIUM SERPL-MCNC: 2.2 MG/DL (ref 1.6–2.3)
MCH RBC QN AUTO: 32.8 PG (ref 26.5–33)
MCHC RBC AUTO-ENTMCNC: 34.7 G/DL (ref 31.5–36.5)
MCV RBC AUTO: 95 FL (ref 78–100)
MONOCYTES # BLD AUTO: 0.6 10E9/L (ref 0–1.3)
MONOCYTES NFR BLD AUTO: 6.7 %
MUCOUS THREADS #/AREA URNS LPF: PRESENT /LPF
NEUTROPHILS # BLD AUTO: 5.3 10E9/L (ref 1.6–8.3)
NEUTROPHILS NFR BLD AUTO: 60.9 %
NITRATE UR QL: NEGATIVE
NRBC # BLD AUTO: 0 10*3/UL
NRBC BLD AUTO-RTO: 0 /100
NT-PROBNP SERPL-MCNC: 63 PG/ML (ref 0–900)
PH UR STRIP: 5.5 PH (ref 4.7–8)
PLATELET # BLD AUTO: 319 10E9/L (ref 150–450)
POTASSIUM SERPL-SCNC: 3.5 MMOL/L (ref 3.4–5.3)
PROT SERPL-MCNC: 7.7 G/DL (ref 6.8–8.8)
RBC # BLD AUTO: 4.57 10E12/L (ref 3.8–5.2)
RBC #/AREA URNS AUTO: 1 /HPF (ref 0–2)
SODIUM SERPL-SCNC: 141 MMOL/L (ref 133–144)
SOURCE: ABNORMAL
SP GR UR STRIP: 1.01 (ref 1–1.03)
TROPONIN I SERPL-MCNC: <0.015 UG/L (ref 0–0.04)
UROBILINOGEN UR STRIP-MCNC: NORMAL MG/DL (ref 0–2)
WBC # BLD AUTO: 8.7 10E9/L (ref 4–11)
WBC #/AREA URNS AUTO: <1 /HPF (ref 0–5)

## 2018-08-20 PROCEDURE — 83880 ASSAY OF NATRIURETIC PEPTIDE: CPT | Performed by: EMERGENCY MEDICINE

## 2018-08-20 PROCEDURE — 93005 ELECTROCARDIOGRAM TRACING: CPT

## 2018-08-20 PROCEDURE — 25000128 H RX IP 250 OP 636: Performed by: EMERGENCY MEDICINE

## 2018-08-20 PROCEDURE — 84484 ASSAY OF TROPONIN QUANT: CPT | Performed by: EMERGENCY MEDICINE

## 2018-08-20 PROCEDURE — 99284 EMERGENCY DEPT VISIT MOD MDM: CPT | Mod: 25

## 2018-08-20 PROCEDURE — 83735 ASSAY OF MAGNESIUM: CPT | Performed by: EMERGENCY MEDICINE

## 2018-08-20 PROCEDURE — 85025 COMPLETE CBC W/AUTO DIFF WBC: CPT | Performed by: EMERGENCY MEDICINE

## 2018-08-20 PROCEDURE — 80053 COMPREHEN METABOLIC PANEL: CPT | Performed by: EMERGENCY MEDICINE

## 2018-08-20 PROCEDURE — 93010 ELECTROCARDIOGRAM REPORT: CPT | Performed by: INTERNAL MEDICINE

## 2018-08-20 PROCEDURE — 96374 THER/PROPH/DIAG INJ IV PUSH: CPT

## 2018-08-20 PROCEDURE — 36415 COLL VENOUS BLD VENIPUNCTURE: CPT | Performed by: EMERGENCY MEDICINE

## 2018-08-20 PROCEDURE — 99284 EMERGENCY DEPT VISIT MOD MDM: CPT | Performed by: EMERGENCY MEDICINE

## 2018-08-20 PROCEDURE — 81001 URINALYSIS AUTO W/SCOPE: CPT | Performed by: EMERGENCY MEDICINE

## 2018-08-20 RX ORDER — AMLODIPINE BESYLATE 5 MG/1
10 TABLET ORAL DAILY
Qty: 30 TABLET | Refills: 1 | Status: SHIPPED | OUTPATIENT
Start: 2018-08-20 | End: 2018-09-14

## 2018-08-20 RX ORDER — LORAZEPAM 2 MG/ML
1 INJECTION INTRAMUSCULAR ONCE
Status: COMPLETED | OUTPATIENT
Start: 2018-08-20 | End: 2018-08-20

## 2018-08-20 RX ADMIN — LORAZEPAM 1 MG: 2 INJECTION INTRAMUSCULAR; INTRAVENOUS at 17:58

## 2018-08-20 ASSESSMENT — ENCOUNTER SYMPTOMS
SHORTNESS OF BREATH: 1
PALPITATIONS: 1
FATIGUE: 1
BACK PAIN: 1
CHEST TIGHTNESS: 1
WEAKNESS: 1
LIGHT-HEADEDNESS: 1
DIAPHORESIS: 1
CHILLS: 1

## 2018-08-20 NOTE — TELEPHONE ENCOUNTER
Spoke with pt and she states that she has been feeling extremely hot off and on.  She also says that she feels her heartbeat in her head.  Pt denies nausea, vomitting, dizziness, or palpitations.  Has had a slight upset stomach.  She states that she has been taking her medication and has checked her blood pressures.    157/80-yesterday  140/80-today in the am  160/86- today at 2pm, Pulse was 76 (only time she checked it)  She states that she is just not feeling right and is wondering what your recommendation is?

## 2018-08-20 NOTE — DISCHARGE INSTRUCTIONS
Taking Your Blood Pressure  Blood pressure is the force of blood against the artery wall as it moves from the heart through the blood vessels. You can take your own blood pressure reading using a digital monitor. Take your readings the same each time, using the same arm. Take readings as often as your healthcare provider instructs.  About blood pressure monitors  Blood pressure monitors are designed for certain ages and cases. You can find monitors for older adults, for pregnant women, and for children. Make sure the one you choose is the right one for your age and situation.  The American Heart Association recommends an automatic cuff monitor that fits on your upper arm (bicep). The cuff should fit your arm size. A cuff that s too large or too small will not give an accurate reading. Measure around your upper arm to find your size.  Monitors that attach to your finger or wrist are not as accurate as monitors for your upper arm.  Ask your healthcare provider for help in choosing a monitor. Bring your monitor to your next provider visit if you need help in using it the correct way.  The steps below are general instructions for using an automatic digital monitor.  Step 1. Relax      Take your blood pressure at the same time every day, such as in the morning or evening, or at the time your healthcare provider recommends.    Wait at least a half-hour after smoking, eating, or exercising. Don't drink coffee, tea, soda, or other caffeinated beverages before checking your blood pressure.    Sit comfortably at a table with both feet on the floor. Do not cross your legs or feet. Place the monitor near you.    Rest for a few minutes before you begin.  Step 2. Wrap the cuff      Place your arm on the table, palm up. Your arm should be at the level of your heart. Wrap the cuff around your upper arm, just above your elbow. It s best done on bare skin, not over clothing. Most cuffs will indicate where the brachial artery (the  blood vessel in the middle of the arm at the inner side of the elbow) should line up with the cuff. Look in your monitor's instruction booklet for an illustration. You can also bring your cuff to your healthcare provider and have them show you how to correctly place the cuff.  Step 3. Inflate the cuff      Push the button that starts the pump.    The cuff will tighten, then loosen.    The numbers will change. When they stop changing, your blood pressure reading will appear.    Take 2 or 3 readings one minute apart.  Step 4. Write down the results of each reading      Write down your blood pressure numbers for each reading. Note the date and time. Keep your results in one place, such as a notebook. Even if your monitor has a built-in memory, keep a hard copy of the readings.    Remove the cuff from your arm. Turn off the machine.    Bring your blood pressure records with your healthcare providers at each visit.    If you start a new blood pressure medicine, note the day you started the new medicine. Also note the day if you change the dose of your medicine. This information goes on your blood pressure recording sheet. This will help your healthcare provider monitor how well the medicine changes are working.    Ask your healthcare provider what numbers should prompt you to call him or her. Also ask what numbers should prompt you to get help right away.  Date Last Reviewed: 11/1/2016 2000-2017 [employer]. [address] [place] [address]. All rights reserved. This information is not intended as a substitute for professional medical care. Always follow your healthcare professional's instructions.          Step-by-Step  Checking Your Blood Pressure    Date Last Reviewed: 4/27/2016 2000-2017 [employer]. [address] [place] [address]. All rights reserved. This information is not intended as a substitute for professional medical care. Always follow your healthcare  professional's instructions.          Discharge Instructions: Taking Calcium Channel Blockers  Your healthcare provider prescribed a medicine called a calcium channel blocker for you. This type of medicine can treat high blood pressure, correct abnormal heart rhythms, and relieve a type of chest pain called angina.     The name of your calcium channel blocker is  _____________________      Home care    Follow the fact sheet that came with your medicine. It tells you when and how to take your medicine. Ask for a sheet if you didn t get one.    Take this medicine exactly as directed, even if you feel fine.    If you miss a dose of this medicine, take it as soon as you remember--unless it s almost time for your next dose. In that case, just wait and take your next dose at the normal time. Don t take a double dose. If you aren't sure what to do, call your healthcare provider or your pharmacist.    Don t drive until you know how you will react to this medicine.    Tell your healthcare provider about any other medicines or herbal remedies you are using.    Be sure to give this medicine time to work. It may take several weeks to lower blood pressure.    Learn to take your own pulse. Keep a record of your results. Ask your provider which pulse rates mean that you need medical attention.    Don t eat grapefruit or drink grapefruit juice. These may interact with calcium channel blockers.    Ask your healthcare provider how much exercise and activity is safe.    See your provider regularly while taking this medicine.  Possible side effects  Tell your healthcare provider if you have any of these side effects. Don t stop taking the medicine unless your doctor tells you to. Mild side effects include:    Sore, bleeding gums    Mild headache    Dizziness or lightheadedness    Flushing    Nausea    Leg swelling  When to call your healthcare provider  Call your healthcare provider right away if you have any of the  following:    Severe headache    Slow, weak pulse    Breathing problems, coughing, or wheezing    Irregular, fast, or pounding heartbeat    Skin rash    Swollen ankles, feet, or lower legs    Constipation   Date Last Reviewed: 6/1/2016 2000-2017 [employer]. [address] [place] [address]. All rights reserved. This information is not intended as a substitute for professional medical care. Always follow your healthcare professional's instructions.      Lisa,  There was nothing about your hx, life style, exam, labs, and ECG that suggest your slight elevation of blood pressure should be a source of great worry.  Especially since you are a disciplined eater and exerciser, the addition of a calcium channel blocker would be a good safe choice and allow you to see Dr Marx in a month after taking daily BPs and continuing your current lifestyle.  You should do well long term.  Good luck!

## 2018-08-20 NOTE — TELEPHONE ENCOUNTER
"2:09 PM    Reason for Call: Phone Call    Description: Pt called and stated that she would like to talk to a nurse about her high blood pressure. Pt stated that she hasn't been feeling \"right\" for a few weeks but has been taking her medication the last few days and still doesn't feel good. Please call. Thank you    Was an appointment offered for this call? No  If yes : Appointment type              Date    Preferred method for responding to this message: Telephone Call  What is your phone number ?[phone]    If we cannot reach you directly, may we leave a detailed response at the number you provided? Yes    Can this message wait until your PCP/provider returns, if available today? Not applicable, provider is in    [place]      "

## 2018-08-20 NOTE — ED NOTES
"Presents ot ER with c/o of \"not feeling like herself, weak, short of breath at times an having episodes where she feels marly her heart is bounding causing pressure in her head. Denies pain, see assessments. Call light within reach.   "
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
## 2018-08-20 NOTE — TELEPHONE ENCOUNTER
She probably needs to go to UC/ER today as she still doesn't feel well, we can see her then in follow-up likely next week.

## 2018-08-20 NOTE — ED AVS SNAPSHOT
[facility]    [address] [place] [address]    Phone:  [phone]                                       Lisa Angeles   MRN: [id number]    Department:  [facility]   Date of Visit:  8/20/2018           Patient Information     Date Of Birth          1955        Your diagnoses for this visit were:     Benign essential hypertension        You were seen by Danny Flowers MD.      Follow-up Information     Follow up with Carolyn Ritter MD In 1 month.    Specialty:  Family Practice    Contact information:    [address] [facility] [address]  [phone]          Discharge Instructions         Taking Your Blood Pressure  Blood pressure is the force of blood against the artery wall as it moves from the heart through the blood vessels. You can take your own blood pressure reading using a digital monitor. Take your readings the same each time, using the same arm. Take readings as often as your healthcare provider instructs.  About blood pressure monitors  Blood pressure monitors are designed for certain ages and cases. You can find monitors for older adults, for pregnant women, and for children. Make sure the one you choose is the right one for your age and situation.  The American Heart Association recommends an automatic cuff monitor that fits on your upper arm (bicep). The cuff should fit your arm size. A cuff that s too large or too small will not give an accurate reading. Measure around your upper arm to find your size.  Monitors that attach to your finger or wrist are not as accurate as monitors for your upper arm.  Ask your healthcare provider for help in choosing a monitor. Bring your monitor to your next provider visit if you need help in using it the correct way.  The steps below are general instructions for using an automatic digital monitor.  Step 1. Relax      Take your blood pressure at the same time every day, such as in the morning or  evening, or at the time your healthcare provider recommends.    Wait at least a half-hour after smoking, eating, or exercising. Don't drink coffee, tea, soda, or other caffeinated beverages before checking your blood pressure.    Sit comfortably at a table with both feet on the floor. Do not cross your legs or feet. Place the monitor near you.    Rest for a few minutes before you begin.  Step 2. Wrap the cuff      Place your arm on the table, palm up. Your arm should be at the level of your heart. Wrap the cuff around your upper arm, just above your elbow. It s best done on bare skin, not over clothing. Most cuffs will indicate where the brachial artery (the blood vessel in the middle of the arm at the inner side of the elbow) should line up with the cuff. Look in your monitor's instruction booklet for an illustration. You can also bring your cuff to your healthcare provider and have them show you how to correctly place the cuff.  Step 3. Inflate the cuff      Push the button that starts the pump.    The cuff will tighten, then loosen.    The numbers will change. When they stop changing, your blood pressure reading will appear.    Take 2 or 3 readings one minute apart.  Step 4. Write down the results of each reading      Write down your blood pressure numbers for each reading. Note the date and time. Keep your results in one place, such as a notebook. Even if your monitor has a built-in memory, keep a hard copy of the readings.    Remove the cuff from your arm. Turn off the machine.    Bring your blood pressure records with your healthcare providers at each visit.    If you start a new blood pressure medicine, note the day you started the new medicine. Also note the day if you change the dose of your medicine. This information goes on your blood pressure recording sheet. This will help your healthcare provider monitor how well the medicine changes are working.    Ask your healthcare provider what numbers should  prompt you to call him or her. Also ask what numbers should prompt you to get help right away.  Date Last Reviewed: 11/1/2016 2000-2017 [employer]. [address] [place] [address]. All rights reserved. This information is not intended as a substitute for professional medical care. Always follow your healthcare professional's instructions.          Step-by-Step  Checking Your Blood Pressure    Date Last Reviewed: 4/27/2016 2000-2017 [employer]. [address] [place] [address]. All rights reserved. This information is not intended as a substitute for professional medical care. Always follow your healthcare professional's instructions.          Discharge Instructions: Taking Calcium Channel Blockers  Your healthcare provider prescribed a medicine called a calcium channel blocker for you. This type of medicine can treat high blood pressure, correct abnormal heart rhythms, and relieve a type of chest pain called angina.     The name of your calcium channel blocker is  _____________________      Home care    Follow the fact sheet that came with your medicine. It tells you when and how to take your medicine. Ask for a sheet if you didn t get one.    Take this medicine exactly as directed, even if you feel fine.    If you miss a dose of this medicine, take it as soon as you remember--unless it s almost time for your next dose. In that case, just wait and take your next dose at the normal time. Don t take a double dose. If you aren't sure what to do, call your healthcare provider or your pharmacist.    Don t drive until you know how you will react to this medicine.    Tell your healthcare provider about any other medicines or herbal remedies you are using.    Be sure to give this medicine time to work. It may take several weeks to lower blood pressure.    Learn to take your own pulse. Keep a record of your results. Ask your provider which pulse rates mean that you need  medical attention.    Don t eat grapefruit or drink grapefruit juice. These may interact with calcium channel blockers.    Ask your healthcare provider how much exercise and activity is safe.    See your provider regularly while taking this medicine.  Possible side effects  Tell your healthcare provider if you have any of these side effects. Don t stop taking the medicine unless your doctor tells you to. Mild side effects include:    Sore, bleeding gums    Mild headache    Dizziness or lightheadedness    Flushing    Nausea    Leg swelling  When to call your healthcare provider  Call your healthcare provider right away if you have any of the following:    Severe headache    Slow, weak pulse    Breathing problems, coughing, or wheezing    Irregular, fast, or pounding heartbeat    Skin rash    Swollen ankles, feet, or lower legs    Constipation   Date Last Reviewed: 6/1/2016 2000-2017 [employer]. [address] [place] [address]. All rights reserved. This information is not intended as a substitute for professional medical care. Always follow your healthcare professional's instructions.      Lisa,  There was nothing about your hx, life style, exam, labs, and ECG that suggest your slight elevation of blood pressure should be a source of great worry.  Especially since you are a disciplined eater and exerciser, the addition of a calcium channel blocker would be a good safe choice and allow you to see Dr Marx in a month after taking daily BPs and continuing your current lifestyle.  You should do well long term.  Good luck!       Review of your medicines      START taking        Dose / Directions Last dose taken    amLODIPine 5 MG tablet   Commonly known as:  NORVASC   Dose:  10 mg   Quantity:  30 tablet        Take 2 tablets (10 mg) by mouth daily   Refills:  1          Our records show that you are taking the medicines listed below. If these are incorrect, please call your family doctor  or clinic.        Dose / Directions Last dose taken    acetaminophen 500 MG tablet   Commonly known as:  ACETAMINOPHEN EXTRA STRENGTH   Dose:  1000 mg   Quantity:  90 tablet        Take 2 tablets (1,000 mg) by mouth every 8 hours as needed for pain or fever   Refills:  0        B Complex-C Caps   Dose:  1 capsule   Quantity:  100 capsule        Take 1 capsule by mouth daily   Refills:  3        CALCIUM ANTACID 500 MG chewable tablet   Quantity:  180 tablet   Generic drug:  calcium carbonate        CHEW 1 TABLET BY MOUTH 2 TIMES A DAY   Refills:  1        cholecalciferol 1000 UNIT tablet   Commonly known as:  vitamin D3   Quantity:  30 tablet        TAKE 1 TABLET BY MOUTH EVERY DAY   Refills:  8        diazepam 5 MG tablet   Commonly known as:  VALIUM   Dose:  5-10 mg   Quantity:  40 tablet        Take 1-2 tablets (5-10 mg) by mouth every 6 hours as needed for muscle spasms (MUSCLE SPASM)   Refills:  0        fish oil-omega-3 fatty acids 1000 MG capsule   Quantity:  100 each        TAKE 3 CAPSULES BY MOUTH EVERY DAY   Refills:  0        HYDROcodone-acetaminophen 7.5-325 MG per tablet   Commonly known as:  NORCO   Dose:  1 tablet   Quantity:  60 tablet        Take 1 tablet by mouth every 6 hours as needed for moderate to severe pain   Refills:  0        ibuprofen 800 MG tablet   Commonly known as:  ADVIL/MOTRIN   Dose:  800 mg   Quantity:  90 tablet        Take 1 tablet (800 mg) by mouth every 8 hours as needed   Refills:  3        order for DME   Quantity:  1 Device        Equipment being ordered: gamekeeper/thumb spica splint   Refills:  0        PREMARIN 0.3 MG tablet   Quantity:  90 tablet   Generic drug:  estrogens (conjugated)        TAKE 1 TABLET BY MOUTH EVERY DAY   Refills:  0        progesterone 100 MG capsule   Commonly known as:  PROMETRIUM   Quantity:  90 capsule        TAKE 1 CAPSULE BY MOUTH EVERY DAY   Refills:  0                Prescriptions were sent or printed at these locations (1 Prescription)      "              Other Prescriptions                Printed at Department/Unit printer (1 of 1)         amLODIPine (NORVASC) 5 MG tablet                Procedures and tests performed during your visit     CBC with platelets differential    Comprehensive metabolic panel    EKG 12 lead    Magnesium    Nt probnp inpatient (BNP)    Orthostatic blood pressure and pulse    Troponin I      Orders Needing Specimen Collection     Ordered          18 1733  UA with Microscopic - STAT, [facility]o: STAT, Status: Sent     Scheduled Task Status   18 1733 [employer] [facility] Reminder: Open   Order Class:  PCU Collect                  Pending Results     No orders found from 2018 to 2018.            Pending Culture Results     No orders found from 2018 to 2018.            Thank you for choosing [facility]       Thank you for choosing [facility] for your care. Our goal is always to provide you with excellent care. Hearing back from our patients is one way we can continue to improve our services. Please take a few minutes to complete the written survey that you may receive in the mail after you visit with us. Thank you!        [employer] Information     [employer] lets you send messages to your doctor, view your test results, renew your prescriptions, schedule appointments and more. To sign up, go to [email or web address] . Click on \"Log in\" on the left side of the screen, which will take you to the Welcome page. Then click on \"Sign up Now\" on the right side of the page.     You will be asked to enter the access code listed below, as well as some personal information. Please follow the directions to create your username and password.     Your access code is: [email or web address]  Expires: 2018  6:46 PM     Your access code will  in 90 days. If you need help or a new code, please call your [facility] clinic or [phone].        Care EveryWhere ID     This is your Care EveryWhere ID. This could be used by other organizations " to access your [place] medical records  [id number]        Equal Access to Services     RUSS BRADFORD : Carola White, rich yin, venita crandall. So [facility] [phone].    ATENCIÓN: Si habla español, tiene a franklin disposición servicios gratuitos de asistencia lingüística. Llame al [phone].    We comply with applicable federal civil rights laws and [place] laws. We do not discriminate on the basis of race, color, national origin, age, disability, sex, sexual orientation, or gender identity.            After Visit Summary       This is your record. Keep this with you and show to your community pharmacist(s) and doctor(s) at your next visit.

## 2018-08-20 NOTE — ED AVS SNAPSHOT
[facility]    [address]    [place] [address]    Phone:  [phone]                                       Lisa Angeles   MRN: [id number]    Department:  [facility]   Date of Visit:  8/20/2018           After Visit Summary Signature Page     I have received my discharge instructions, and my questions have been answered. I have discussed any challenges I see with this plan with the nurse or doctor.    ..........................................................................................................................................  Patient/Patient Representative Signature      ..........................................................................................................................................  Patient Representative Print Name and Relationship to Patient    ..................................................               ................................................  Date                                            Time    ..........................................................................................................................................  Reviewed by Signature/Title    ...................................................              ..............................................  Date                                                            Time

## 2018-08-21 NOTE — ED NOTES
Reviewed discharge instructions with pt, verbalized understanding, no questions asked. Copy of AVS provided, along with prescription for Norvasc.

## 2018-08-21 NOTE — TELEPHONE ENCOUNTER
"To: [facility] nurse  Patient went to urgent care yesterday 8/20/18 and would like to speak with the doctor to discuss the visit.  Also needs to set up a follow up appointment but wanted to speak with the doctor first to \"get her take on it\" Her phone   "

## 2018-08-21 NOTE — TELEPHONE ENCOUNTER
Pt went to the ER. She states the he recommended a stress test, but that she should discuss this with you.  Would you like to order this?  Pt is wondering if she should get this done before her follow up on 09/20/18.

## 2018-09-05 ENCOUNTER — HOSPITAL ENCOUNTER (OUTPATIENT)
Dept: CARDIOLOGY | Facility: HOSPITAL | Age: 63
Discharge: HOME | End: 2018-09-05
Attending: FAMILY MEDICINE | Admitting: FAMILY MEDICINE
Payer: MEDICARE

## 2018-09-05 DIAGNOSIS — I10 BENIGN ESSENTIAL HYPERTENSION: ICD-10-CM

## 2018-09-05 DIAGNOSIS — R06.02 SOB (SHORTNESS OF BREATH): ICD-10-CM

## 2018-09-05 PROCEDURE — 93350 STRESS TTE ONLY: CPT | Mod: 26 | Performed by: INTERNAL MEDICINE

## 2018-09-05 PROCEDURE — 93018 CV STRESS TEST I&R ONLY: CPT | Performed by: INTERNAL MEDICINE

## 2018-09-05 PROCEDURE — 93325 DOPPLER ECHO COLOR FLOW MAPG: CPT | Mod: 26 | Performed by: INTERNAL MEDICINE

## 2018-09-05 PROCEDURE — 93321 DOPPLER ECHO F-UP/LMTD STD: CPT | Mod: 26 | Performed by: INTERNAL MEDICINE

## 2018-09-05 PROCEDURE — 93016 CV STRESS TEST SUPVJ ONLY: CPT | Performed by: INTERNAL MEDICINE

## 2018-09-05 PROCEDURE — 93017 CV STRESS TEST TRACING ONLY: CPT

## 2018-09-05 PROCEDURE — 93321 DOPPLER ECHO F-UP/LMTD STD: CPT | Mod: TC

## 2018-09-14 ENCOUNTER — TELEPHONE (OUTPATIENT)
Dept: FAMILY MEDICINE | Facility: OTHER | Age: 63
End: 2018-09-14

## 2018-09-14 DIAGNOSIS — I10 BENIGN ESSENTIAL HYPERTENSION: Primary | ICD-10-CM

## 2018-09-14 RX ORDER — AMLODIPINE BESYLATE 5 MG/1
10 TABLET ORAL DAILY
Qty: 60 TABLET | Refills: 2 | Status: SHIPPED | OUTPATIENT
Start: 2018-09-14 | End: 2018-12-10

## 2018-09-14 RX ORDER — AMLODIPINE BESYLATE 5 MG/1
10 TABLET ORAL DAILY
Qty: 30 TABLET | Refills: 1 | Status: CANCELLED | OUTPATIENT
Start: 2018-09-14

## 2018-09-14 NOTE — TELEPHONE ENCOUNTER
11:14 AM    Reason for Call: Phone Call    Description: Pt called and stated that she was prescribed amLODIPine (NORVASC) 5 MG tablet when she went into the ER on 8/20. Pt is seeing primary on 9/20 but stated that she will run out of medication before the appt and would like to know if she can get a refill before then. Please call and advise.     Was an appointment offered for this call? No  If yes : Appointment type              Date    Preferred method for responding to this message: Telephone Call  What is your phone number ?[phone]    If we cannot reach you directly, may we leave a detailed response at the number you provided? Yes    Can this message wait until your PCP/provider returns, if available today? Not applicable, provider is in    [place]
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
## 2018-09-20 ENCOUNTER — OFFICE VISIT (OUTPATIENT)
Dept: FAMILY MEDICINE | Facility: OTHER | Age: 63
End: 2018-09-20
Attending: FAMILY MEDICINE
Payer: MEDICARE

## 2018-09-20 VITALS
SYSTOLIC BLOOD PRESSURE: 132 MMHG | TEMPERATURE: 98 F | DIASTOLIC BLOOD PRESSURE: 68 MMHG | HEIGHT: 60 IN | OXYGEN SATURATION: 98 % | BODY MASS INDEX: 31.25 KG/M2 | WEIGHT: 159.2 LBS | HEART RATE: 72 BPM | RESPIRATION RATE: 16 BRPM

## 2018-09-20 DIAGNOSIS — I10 BENIGN ESSENTIAL HYPERTENSION: Primary | ICD-10-CM

## 2018-09-20 DIAGNOSIS — Z23 NEED FOR PROPHYLACTIC VACCINATION AND INOCULATION AGAINST INFLUENZA: ICD-10-CM

## 2018-09-20 PROCEDURE — G0463 HOSPITAL OUTPT CLINIC VISIT: HCPCS

## 2018-09-20 PROCEDURE — G0008 ADMIN INFLUENZA VIRUS VAC: HCPCS | Performed by: FAMILY MEDICINE

## 2018-09-20 PROCEDURE — 99213 OFFICE O/P EST LOW 20 MIN: CPT | Performed by: FAMILY MEDICINE

## 2018-09-20 PROCEDURE — 90686 IIV4 VACC NO PRSV 0.5 ML IM: CPT | Performed by: FAMILY MEDICINE

## 2018-09-20 PROCEDURE — G0463 HOSPITAL OUTPT CLINIC VISIT: HCPCS | Mod: 25

## 2018-09-20 ASSESSMENT — ANXIETY QUESTIONNAIRES
7. FEELING AFRAID AS IF SOMETHING AWFUL MIGHT HAPPEN: NOT AT ALL
GAD7 TOTAL SCORE: 4
IF YOU CHECKED OFF ANY PROBLEMS ON THIS QUESTIONNAIRE, HOW DIFFICULT HAVE THESE PROBLEMS MADE IT FOR YOU TO DO YOUR WORK, TAKE CARE OF THINGS AT HOME, OR GET ALONG WITH OTHER PEOPLE: NOT DIFFICULT AT ALL
2. NOT BEING ABLE TO STOP OR CONTROL WORRYING: SEVERAL DAYS
6. BECOMING EASILY ANNOYED OR IRRITABLE: MORE THAN HALF THE DAYS
3. WORRYING TOO MUCH ABOUT DIFFERENT THINGS: SEVERAL DAYS
1. FEELING NERVOUS, ANXIOUS, OR ON EDGE: NOT AT ALL
5. BEING SO RESTLESS THAT IT IS HARD TO SIT STILL: NOT AT ALL

## 2018-09-20 ASSESSMENT — PAIN SCALES - GENERAL: PAINLEVEL: MODERATE PAIN (4)

## 2018-09-20 ASSESSMENT — PATIENT HEALTH QUESTIONNAIRE - PHQ9: 5. POOR APPETITE OR OVEREATING: NOT AT ALL

## 2018-09-20 NOTE — MR AVS SNAPSHOT
After Visit Summary   9/20/2018    Lisa Angeles    MRN: [id number]           Patient Information     Date Of Birth          1955        Visit Information        Provider Department      9/20/2018 11:45 AM Carolyn Ritter MD [facility]        Today's Diagnoses     Benign essential hypertension    -  1    Need for prophylactic vaccination and inoculation against influenza           Follow-ups after your visit        Follow-up notes from your care team     Return in about 3 months (around 12/20/2018).      Who to contact     If you have questions or need follow up information about today's clinic visit or your schedule please contact [facility] directly at [phone].  Normal or non-critical lab and imaging results will be communicated to you by MyChart, letter or phone within 4 business days after the clinic has received the results. If you do not hear from us within 7 days, please contact the clinic through MyChart or phone. If you have a critical or abnormal lab result, we will notify you by phone as soon as possible.  Submit refill requests through [employer] or call your pharmacy and they will forward the refill request to us. Please allow 3 business days for your refill to be completed.          Additional Information About Your Visit        Care EveryWhere ID     This is your Care EveryWhere ID. This could be used by other organizations to access your [place] medical records  [id number]        Your Vitals Were     Pulse Temperature Respirations Height Pulse Oximetry BMI (Body Mass Index)    72 98  F (36.7  C) (Tympanic) 16 5' (1.524 m) 98% 31.09 kg/m2       Blood Pressure from Last 3 Encounters:   09/20/18 132/68   08/20/18 135/72   03/26/18 126/70    Weight from Last 3 Encounters:   09/20/18 159 lb 3.2 oz (72.2 kg)   03/26/18 156 lb (70.8 kg)   01/15/18 154 lb (69.9 kg)              We Performed the Following     FLU VACCINE, IM  (QUADRIVALENT W/PRESERVATIVES/MULTI-DOSE) [57714]- >3 YRS     Vaccine Administration, Initial [08839]          Today's Medication Changes          These changes are accurate as of 9/20/18  1:21 PM.  If you have any questions, ask your nurse or doctor.               Start taking these medicines.        Dose/Directions    order for DME   Used for:  Benign essential hypertension   Started by:  Carolyn Ritetr MD        Equipment being ordered: Automatic Blood Pressure Cuff   Quantity:  1 Device   Refills:  0         These medicines have changed or have updated prescriptions.        Dose/Directions    CALCIUM ANTACID 500 MG chewable tablet   This may have changed:  See the new instructions.   Used for:  Lumbago   Generic drug:  calcium carbonate        CHEW 1 TABLET BY MOUTH 2 TIMES A DAY   Quantity:  180 tablet   Refills:  1            Where to get your medicines      Some of these will need a paper prescription and others can be bought over the counter.  Ask your nurse if you have questions.     Bring a paper prescription for each of these medications     order for DME                Primary Care Provider Office Phone # Fax #    Carolyn Ritter -[phone] [address] [facility] [address]        Equal Access to Services     [facility]: Hadii rodrick ferrer Sobharat, waaxda luqbrandon, qaybta kaalmaestefania bledsoe, venita lopez. So [facility] [phone].    ATENCIÓN: Si habla español, tiene a franklin disposición servicios gratuitos de asistencia lingüística. Camila al [phone].    We comply with applicable federal civil rights laws and [place] laws. We do not discriminate on the basis of race, color, national origin, age, disability, sex, sexual orientation, or gender identity.            Thank you!     Thank you for choosing [facility]  for your care. Our goal is always to provide you with excellent care. Hearing back from our  patients is one way we can continue to improve our services. Please take a few minutes to complete the written survey that you may receive in the mail after your visit with us. Thank you!             Your Updated Medication List - Protect others around you: Learn how to safely use, store and throw away your medicines at www.disposemymeds.org.          This list is accurate as of 9/20/18  1:21 PM.  Always use your most recent med list.                   Brand Name Dispense Instructions for use Diagnosis    amLODIPine 5 MG tablet    NORVASC    60 tablet    Take 2 tablets (10 mg) by mouth daily    Benign essential hypertension       B Complex-C Caps     100 capsule    Take 1 capsule by mouth daily    Diagnosis unknown       CALCIUM ANTACID 500 MG chewable tablet   Generic drug:  calcium carbonate     180 tablet    CHEW 1 TABLET BY MOUTH 2 TIMES A DAY    Lumbago       cholecalciferol 1000 UNIT tablet    vitamin D3    30 tablet    TAKE 1 TABLET BY MOUTH EVERY DAY    Healthcare maintenance       diazepam 5 MG tablet    VALIUM    40 tablet    Take 1-2 tablets (5-10 mg) by mouth every 6 hours as needed for muscle spasms (MUSCLE SPASM)    Chronic bilateral low back pain with right-sided sciatica, Sciatica, unspecified laterality       fish oil-omega-3 fatty acids 1000 MG capsule     100 each    TAKE 3 CAPSULES BY MOUTH EVERY DAY    Hyperlipidemia, unspecified hyperlipidemia type       HYDROcodone-acetaminophen 7.5-325 MG per tablet    NORCO    60 tablet    Take 1 tablet by mouth every 6 hours as needed for moderate to severe pain    Chronic bilateral low back pain with right-sided sciatica, Sciatica, unspecified laterality       ibuprofen 800 MG tablet    ADVIL/MOTRIN    90 tablet    Take 1 tablet (800 mg) by mouth every 8 hours as needed    Chronic bilateral low back pain with right-sided sciatica       order for DME     1 Device    Equipment being ordered: gamekeeper/thumb spica splint    Radial styloid tenosynovitis        order for DME     1 Device    Equipment being ordered: Automatic Blood Pressure Cuff    Benign essential hypertension       PREMARIN 0.3 MG tablet   Generic drug:  estrogens (conjugated)     90 tablet    TAKE 1 TABLET BY MOUTH EVERY DAY    Menopausal syndrome (hot flashes)       progesterone 100 MG capsule    PROMETRIUM    90 capsule    TAKE 1 CAPSULE BY MOUTH EVERY DAY    Menopausal syndrome (hot flashes)

## 2018-09-20 NOTE — NURSING NOTE
Chief Complaint   Patient presents with     ER F/U       Initial /68 (BP Location: Left arm, Patient Position: Sitting, Cuff Size: Adult Regular)  Pulse 72  Temp 98  F (36.7  C) (Tympanic)  Resp 16  Ht 5' (1.524 m)  Wt 159 lb 3.2 oz (72.2 kg)  SpO2 98%  BMI 31.09 kg/m2 Estimated body mass index is 31.09 kg/(m^2) as calculated from the following:    Height as of this encounter: 5' (1.524 m).    Weight as of this encounter: 159 lb 3.2 oz (72.2 kg).  Medication Reconciliation: complete    Jeni Byrne MA

## 2018-09-21 PROCEDURE — 90686 IIV4 VACC NO PRSV 0.5 ML IM: CPT | Performed by: FAMILY MEDICINE

## 2018-09-21 ASSESSMENT — PATIENT HEALTH QUESTIONNAIRE - PHQ9: SUM OF ALL RESPONSES TO PHQ QUESTIONS 1-9: 8

## 2018-09-21 ASSESSMENT — ANXIETY QUESTIONNAIRES: GAD7 TOTAL SCORE: 4

## 2018-09-21 NOTE — PROGRESS NOTES

## 2018-10-01 ENCOUNTER — TELEPHONE (OUTPATIENT)
Dept: FAMILY MEDICINE | Facility: OTHER | Age: 63
End: 2018-10-01

## 2018-10-01 NOTE — TELEPHONE ENCOUNTER
10:14 AM    Reason for Call: Phone Call    Description: pt needs a paper Rx for [employer] for back strength    Was an appointment offered for this call? Yes  If yes : Appointment type              Date    Preferred method for responding to this message: Telephone Call  What is your phone number ?    If we cannot reach you directly, may we leave a detailed response at the number you provided? Yes    Can this message wait until your PCP/provider returns, if available today? Not applicable,     Kina Mack

## 2018-10-03 ENCOUNTER — TELEPHONE (OUTPATIENT)
Dept: FAMILY MEDICINE | Facility: OTHER | Age: 63
End: 2018-10-03

## 2018-10-03 NOTE — TELEPHONE ENCOUNTER
9:48 AM    Reason for Call: Phone Call    Description: Pt stopped in and is wondering if she needs an appointment for PT referral  With workman's comp or if she could just get the referral.  Please advise   Was an appointment offered for this call? No  If yes : Appointment type              Date    Preferred method for responding to this message: Telephone Call  What is your phone number ? [phone]    If we cannot reach you directly, may we leave a detailed response at the number you provided? Yes,  is having hip replaced tomorrow 10/04/2018 so please feel free to leave detailed message on cell phone    Can this message wait until your PCP/provider returns, if available today? Yes, advised provider is out today    Thank you,     Ewa Villalta

## 2018-10-03 NOTE — TELEPHONE ENCOUNTER
I do not see where she has treated for work comp since last Dec which indicates a lapse in treatment .So, yes she should have an appt to be evalutated and see if PT is still recommended by the provider.        Thank you,    Jazmyne GÓMEZ-P  Work Comp Dept  [address] [address]  Office: [phone] Fax [phone]

## 2018-11-06 NOTE — PROGRESS NOTES
Occupational Visit     Subjective:  Lisa Angeles, 63 year old, female is seen at [facility] [place] today on 11/09/18.  The date of injury is 09/14/09.  The patient is unemployed.    Patient states her back and leg pain is worse after a recent cardiac stress test, where she had to walk on a treadmill per the Michael protocol.  She has episodes where her back hurts so much she can't get up from a seated position.  She feels like revisiting Physical Therapy would help immensely.      No Known Allergies      Review of Systems:  Constitutional, HEENT, cardiovascular, pulmonary, gi and gu systems are negative, except as otherwise noted.      OBJECTIVE:  Blood pressure 112/68, pulse 77, temperature 97.5  F (36.4  C), temperature source Tympanic, resp. rate 16, height 5' (1.524 m), weight 160 lb (72.6 kg), SpO2 98 %, not currently breastfeeding.    Exam:   General: awake, alert  Back: difficulty changing positions, sitting very stiffly in chair, pain across low back and into right buttock      Labs: none indicated      ASSESSMENT/PLAN:  1. Sciatica of right side  Referral ordered.  I think she just needs to get back on track to get symptoms under control.  Refills of medications will be due soon, but not quite yet.  - PHYSICAL THERAPY REFERRAL    2. Chronic bilateral low back pain with right-sided sciatica  As above.  - PHYSICAL THERAPY REFERRAL       Carolyn Ritter MD

## 2018-11-09 ENCOUNTER — OFFICE VISIT (OUTPATIENT)
Dept: FAMILY MEDICINE | Facility: OTHER | Age: 63
End: 2018-11-09
Attending: FAMILY MEDICINE
Payer: OTHER MISCELLANEOUS

## 2018-11-09 VITALS
TEMPERATURE: 97.5 F | RESPIRATION RATE: 16 BRPM | OXYGEN SATURATION: 98 % | BODY MASS INDEX: 31.41 KG/M2 | SYSTOLIC BLOOD PRESSURE: 112 MMHG | WEIGHT: 160 LBS | HEIGHT: 60 IN | HEART RATE: 77 BPM | DIASTOLIC BLOOD PRESSURE: 68 MMHG

## 2018-11-09 DIAGNOSIS — M54.31 SCIATICA OF RIGHT SIDE: Primary | ICD-10-CM

## 2018-11-09 DIAGNOSIS — G89.29 CHRONIC BILATERAL LOW BACK PAIN WITH RIGHT-SIDED SCIATICA: ICD-10-CM

## 2018-11-09 DIAGNOSIS — M54.41 CHRONIC BILATERAL LOW BACK PAIN WITH RIGHT-SIDED SCIATICA: ICD-10-CM

## 2018-11-09 PROCEDURE — 99213 OFFICE O/P EST LOW 20 MIN: CPT | Performed by: FAMILY MEDICINE

## 2018-11-09 ASSESSMENT — PAIN SCALES - GENERAL: PAINLEVEL: SEVERE PAIN (6)

## 2018-11-09 NOTE — MR AVS SNAPSHOT
After Visit Summary   11/9/2018    Lisa Angeles    MRN: [id number]           Patient Information     Date Of Birth          1955        Visit Information        Provider Department      11/9/2018 1:30 PM Carolyn Ritter MD [facility]        Today's Diagnoses     Sciatica of right side    -  1    Chronic bilateral low back pain with right-sided sciatica           Follow-ups after your visit        Additional Services     PHYSICAL THERAPY REFERRAL       If you have not heard from the scheduling office within 2 business days, please call [phone] for all locations, with the exception of [place], please call [phone] and [facility], please call [phone].    Please be aware that coverage of these services is subject to the terms and limitations of your health insurance plan.  Call member services at your health plan with any benefit or coverage questions.                  Follow-up notes from your care team     Return if symptoms worsen or fail to improve.      Who to contact     If you have questions or need follow up information about today's clinic visit or your schedule please contact [facility] directly at [phone].  Normal or non-critical lab and imaging results will be communicated to you by MyChart, letter or phone within 4 business days after the clinic has received the results. If you do not hear from us within 7 days, please contact the clinic through MyChart or phone. If you have a critical or abnormal lab result, we will notify you by phone as soon as possible.  Submit refill requests through [employer] or call your pharmacy and they will forward the refill request to us. Please allow 3 business days for your refill to be completed.          Additional Information About Your Visit        Care EveryWhere ID     This is your Care EveryWhere ID. This could be used by other organizations to access your [facility]  records  [id number]        Your Vitals Were     Pulse Temperature Respirations Height Pulse Oximetry BMI (Body Mass Index)    77 97.5  F (36.4  C) (Tympanic) 16 5' (1.524 m) 98% 31.25 kg/m2       Blood Pressure from Last 3 Encounters:   11/09/18 112/68   09/20/18 132/68   08/20/18 135/72    Weight from Last 3 Encounters:   11/09/18 160 lb (72.6 kg)   09/20/18 159 lb 3.2 oz (72.2 kg)   03/26/18 156 lb (70.8 kg)              We Performed the Following     PHYSICAL THERAPY REFERRAL          Today's Medication Changes          These changes are accurate as of 11/9/18 11:59 PM.  If you have any questions, ask your nurse or doctor.               These medicines have changed or have updated prescriptions.        Dose/Directions    CALCIUM ANTACID 500 MG chewable tablet   This may have changed:  See the new instructions.   Used for:  Lumbago   Generic drug:  calcium carbonate        CHEW 1 TABLET BY MOUTH 2 TIMES A DAY   Quantity:  180 tablet   Refills:  1                Primary Care Provider Office Phone # Fax #    Carolyn Ritter [phone] [facility] [address]        Equal Access to Services     [facility]: Carola ferrer Sobharat, wajackda lujosef, qaybta kaalmada ademartitayada, venita lopez. So [facility] [phone].    ATENCIÓN: Si habla español, tiene a franklin disposición servicios gratuitos de asistencia lingüística. Llame al [phone].    We comply with applicable federal civil rights laws and [place] laws. We do not discriminate on the basis of race, color, national origin, age, disability, sex, sexual orientation, or gender identity.            Thank you!     Thank you for choosing [facility]  for your care. Our goal is always to provide you with excellent care. Hearing back from our patients is one way we can continue to improve our services. Please take a few minutes to complete the written survey that  you may receive in the mail after your visit with us. Thank you!             Your Updated Medication List - Protect others around you: Learn how to safely use, store and throw away your medicines at www.disposemymeds.org.          This list is accurate as of 11/9/18 11:59 PM.  Always use your most recent med list.                   Brand Name Dispense Instructions for use Diagnosis    amLODIPine 5 MG tablet    NORVASC    60 tablet    Take 2 tablets (10 mg) by mouth daily    Benign essential hypertension       B Complex-C Caps     100 capsule    Take 1 capsule by mouth daily    Diagnosis unknown       CALCIUM ANTACID 500 MG chewable tablet   Generic drug:  calcium carbonate     180 tablet    CHEW 1 TABLET BY MOUTH 2 TIMES A DAY    Lumbago       cholecalciferol 1000 UNIT tablet    vitamin D3    30 tablet    TAKE 1 TABLET BY MOUTH EVERY DAY    Healthcare maintenance       diazepam 5 MG tablet    VALIUM    40 tablet    Take 1-2 tablets (5-10 mg) by mouth every 6 hours as needed for muscle spasms (MUSCLE SPASM)    Chronic bilateral low back pain with right-sided sciatica, Sciatica, unspecified laterality       fish oil-omega-3 fatty acids 1000 MG capsule     100 each    TAKE 3 CAPSULES BY MOUTH EVERY DAY    Hyperlipidemia, unspecified hyperlipidemia type       HYDROcodone-acetaminophen 7.5-325 MG per tablet    NORCO    60 tablet    Take 1 tablet by mouth every 6 hours as needed for moderate to severe pain    Chronic bilateral low back pain with right-sided sciatica, Sciatica, unspecified laterality       ibuprofen 800 MG tablet    ADVIL/MOTRIN    90 tablet    Take 1 tablet (800 mg) by mouth every 8 hours as needed    Chronic bilateral low back pain with right-sided sciatica       order for DME     1 Device    Equipment being ordered: gamekeeper/thumb spica splint    Radial styloid tenosynovitis       order for DME     1 Device    Equipment being ordered: Automatic Blood Pressure Cuff    Benign essential hypertension        PREMARIN 0.3 MG tablet   Generic drug:  estrogens (conjugated)     90 tablet    TAKE 1 TABLET BY MOUTH EVERY DAY    Menopausal syndrome (hot flashes)       progesterone 100 MG capsule    PROMETRIUM    90 capsule    TAKE 1 CAPSULE BY MOUTH EVERY DAY    Menopausal syndrome (hot flashes)

## 2018-11-09 NOTE — NURSING NOTE
Chief Complaint   Patient presents with     Work Comp       Initial /68 (BP Location: Left arm, Patient Position: Sitting, Cuff Size: Adult Regular)  Pulse 77  Temp 97.5  F (36.4  C) (Tympanic)  Resp 16  Ht 5' (1.524 m)  Wt 160 lb (72.6 kg)  SpO2 98%  BMI 31.25 kg/m2 Estimated body mass index is 31.25 kg/(m^2) as calculated from the following:    Height as of this encounter: 5' (1.524 m).    Weight as of this encounter: 160 lb (72.6 kg).  Medication Reconciliation: complete    Jeni Byrne MA

## 2018-11-21 ENCOUNTER — HOSPITAL ENCOUNTER (OUTPATIENT)
Dept: PHYSICAL THERAPY | Facility: OTHER | Age: 63
End: 2018-11-21
Attending: FAMILY MEDICINE
Payer: OTHER MISCELLANEOUS

## 2018-11-21 PROCEDURE — 97140 MANUAL THERAPY 1/> REGIONS: CPT | Mod: GP

## 2018-11-21 PROCEDURE — 97035 APP MDLTY 1+ULTRASOUND EA 15: CPT | Mod: GP

## 2018-11-21 PROCEDURE — 97162 PT EVAL MOD COMPLEX 30 MIN: CPT | Mod: GP

## 2018-11-21 NOTE — PROGRESS NOTES
11/21/18 0954   General Information   Type of Visit Initial OP Ortho PT Evaluation   Start of Care Date 11/21/18   Referring Physician Dr. Ritter   Patient/Family Goals Statement lessen pain   Orders Evaluate and Treat   Date of Order 11/09/18   Insurance Type Other  (Work comp)   Insurance Comments/Visits Authorized 12 sessions auth   Medical Diagnosis Chronic LBP with R sciatica   Surgical/Medical history reviewed Yes   Precautions/Limitations no known precautions/limitations   General Information Comments PMH - see chart (lumbago, sciatica, sinusitis, basal cell carcinoma eyebrow, menopause, high blood pressure)   Body Part(s)   Body Part(s) Lumbar Spine/SI   Presentation and Etiology   Pertinent history of current problem (include personal factors and/or comorbidities that impact the POC) Patient reports date of injury is 9/14/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  She has had previous diagnostic testing.  She is been seen in physical therapy previously with her last sessions being approximately 1 year ago.  She states her back pain increased approximately 1 month or more ago when having to have a stress test.  She reports she noted increased back pain during the stress test and also after the stress test.  She has had 2 decrease her activity level with the increased back pain.  She was seen by Dr. Marx recently and is now referred back to physical therapy.  She does admit she has not been doing her physical therapy exercises but has continued her workouts at [employer].   Impairments A. Pain;D. Decreased ROM;E. Decreased flexibility;F. Decreased strength and endurance;H. Impaired gait   Functional Limitations perform activities of daily living;perform desired leisure / sports activities   Symptom Location Back pain especially right lumbosacral and into the gluteal region.   How/Where did it occur At work;During contact with another person   Chronicity Chronic   Pain rating (0-10  point scale) Best (/10);Worst (/10)   Best (/10) 2   Worst (/10) 8   Pain quality A. Sharp;B. Dull;C. Aching;D. Burning;E. Shooting;F. Stabbing   Frequency of pain/symptoms A. Constant   Pain/symptoms are: The same all the time   Pain/symptoms exacerbated by A. Sitting;B. Walking;C. Lifting;D. Carrying;G. Certain positions;H. Overhead reach;I. Bending;J. ADL;K. Home tasks;M. Other   Pain exacerbation comment Standing, social life, sex life, and traveling   Pain/symptoms eased by A. Sitting;B. Walking;C. Rest;D. Nothing;E. Changing positions;F. Certain positions;G. Heat   Progression of symptoms since onset: Improved   Current Level of Function   Patient role/employment history G. Disabled   Living environment [facility]   Fall Risk Screen   Fall screen completed by PT   Have you fallen 2 or more times in the past year? No   Have you fallen and had an injury in the past year? No   Is patient a fall risk? No   System Outcome Measures   Outcome Measures Low Back Pain (see Oswestry and Adrian)   Lumbar Spine/SI Objective Findings   Observation No acute distress   Posture Left shoulder and iliac crests are elevated as compared to right   Gait/Locomotion Somewhat slow and antalgic   Flexion ROM 0-52 w pain /pull R LB /Glut   Extension ROM 0-14 w B LS pain   Right Side Bending ROM 0-19 w pull /discomfort on R   Left Side Bending ROM 0-24 w CL pull   Repeated Extension-Standing ROM increased area of soreness on R   Repeated Flexion-Standing ROM neg   Pelvic Screen Positive for a left on left anterior sacral torsion dysfunction   Hip Screen Negative   Lumbar/Hip/Knee/Foot Strength Comments She is able to heel and toe walk well   Hamstring Flexibility Mild tightness   Quadricep Flexibility Decreased   Piriformis Flexibility Significant tightness on the right greater than left   SLR Negative   Prone Instability Test Negative   Crossover SLR Negative   Slump Test Negative   Segmental Mobility Posterior rotation left L5-L3 in  all planes   Palpation Soft tissue tension and tenderness to palpation along the right lumbar/he will muscle, right piriformis/gluteal muscles.  Point tenderness over the right SI joint   Planned Therapy Interventions   Planned Therapy Interventions joint mobilization;manual therapy;ROM;strengthening;stretching   Planned Modality Interventions   Planned Modality Interventions Cryotherapy;Electrical stimulation;Ultrasound   Clinical Impression   Criteria for Skilled Therapeutic Interventions Met yes, treatment indicated   PT Diagnosis R SIJ dysfunction   Influenced by the following impairments Pain, decreased mobility and strength   Functional limitations due to impairments Sitting, walking, standing, ADLs, household activities, bending, sleeping   Clinical Presentation Evolving/Changing   Clinical Presentation Rationale Increase in symptoms recently   Clinical Decision Making (Complexity) Moderate complexity   Therapy Frequency 2 times/Week   Predicted Duration of Therapy Intervention (days/wks) 4 weeks   Risk & Benefits of therapy have been explained Yes   Patient, Family & other staff in agreement with plan of care Yes   Clinical Impression Comments She presents with right SI joint dysfunction and soft tissue imbalances/restrictions   Education Assessment   Barriers to Learning No barriers   ORTHO GOALS   PT Ortho Eval Goals 1;2;3   Ortho Goal 1   Goal Identifier S TG 1   Goal Description Decreased pain when at its worst to a 7/10   Target Date 12/05/18   Ortho Goal 2   Goal Identifier LTG 1   Goal Description She will demonstrate independence with home exercise program   Target Date 12/19/18   Ortho Goal 3   Goal Identifier LTG 2   Goal Description She will be able to walk 1+ miles   Target Date 12/19/18   Total Evaluation Time   Total Evaluation Time 30

## 2018-11-27 ENCOUNTER — HOSPITAL ENCOUNTER (OUTPATIENT)
Dept: PHYSICAL THERAPY | Facility: OTHER | Age: 63
End: 2018-11-27
Attending: FAMILY MEDICINE
Payer: OTHER MISCELLANEOUS

## 2018-11-27 PROCEDURE — 97110 THERAPEUTIC EXERCISES: CPT | Mod: GP

## 2018-11-27 PROCEDURE — 97035 APP MDLTY 1+ULTRASOUND EA 15: CPT | Mod: GP

## 2018-11-29 ENCOUNTER — HOSPITAL ENCOUNTER (OUTPATIENT)
Dept: PHYSICAL THERAPY | Facility: OTHER | Age: 63
End: 2018-11-29
Attending: FAMILY MEDICINE
Payer: OTHER MISCELLANEOUS

## 2018-11-29 PROCEDURE — 97035 APP MDLTY 1+ULTRASOUND EA 15: CPT | Mod: GP

## 2018-11-29 PROCEDURE — 97140 MANUAL THERAPY 1/> REGIONS: CPT | Mod: GP

## 2018-12-03 ENCOUNTER — TELEPHONE (OUTPATIENT)
Dept: FAMILY MEDICINE | Facility: OTHER | Age: 63
End: 2018-12-03

## 2018-12-03 NOTE — TELEPHONE ENCOUNTER
Patient called in regards to seeing a warning on tv this weekend that her medication Amlodipine.  She stated that it is reported as having cancer causing agents in it.  She wants to know what is being done about this and would like a call back.  Thank you.

## 2018-12-04 ENCOUNTER — HOSPITAL ENCOUNTER (OUTPATIENT)
Dept: PHYSICAL THERAPY | Facility: OTHER | Age: 63
End: 2018-12-04
Attending: FAMILY MEDICINE
Payer: OTHER MISCELLANEOUS

## 2018-12-04 PROCEDURE — 97110 THERAPEUTIC EXERCISES: CPT | Mod: GP

## 2018-12-04 PROCEDURE — 97140 MANUAL THERAPY 1/> REGIONS: CPT | Mod: GP

## 2018-12-06 ENCOUNTER — HOSPITAL ENCOUNTER (OUTPATIENT)
Dept: PHYSICAL THERAPY | Facility: OTHER | Age: 63
End: 2018-12-06
Attending: FAMILY MEDICINE
Payer: OTHER MISCELLANEOUS

## 2018-12-06 DIAGNOSIS — Z12.31 VISIT FOR SCREENING MAMMOGRAM: Primary | ICD-10-CM

## 2018-12-06 PROCEDURE — 97140 MANUAL THERAPY 1/> REGIONS: CPT | Mod: GP

## 2018-12-11 ENCOUNTER — HOSPITAL ENCOUNTER (OUTPATIENT)
Dept: PHYSICAL THERAPY | Facility: OTHER | Age: 63
End: 2018-12-11
Attending: FAMILY MEDICINE
Payer: OTHER MISCELLANEOUS

## 2018-12-11 PROCEDURE — 97140 MANUAL THERAPY 1/> REGIONS: CPT | Mod: GP

## 2018-12-13 ENCOUNTER — HOSPITAL ENCOUNTER (OUTPATIENT)
Dept: PHYSICAL THERAPY | Facility: OTHER | Age: 63
End: 2018-12-13
Attending: FAMILY MEDICINE
Payer: OTHER MISCELLANEOUS

## 2018-12-13 PROCEDURE — 97140 MANUAL THERAPY 1/> REGIONS: CPT | Mod: GP

## 2018-12-18 ENCOUNTER — HOSPITAL ENCOUNTER (OUTPATIENT)
Dept: PHYSICAL THERAPY | Facility: OTHER | Age: 63
End: 2018-12-18
Attending: FAMILY MEDICINE
Payer: OTHER MISCELLANEOUS

## 2018-12-18 PROCEDURE — 97140 MANUAL THERAPY 1/> REGIONS: CPT | Mod: GP

## 2018-12-18 NOTE — PROGRESS NOTES
"Outpatient Physical Therapy Discharge Note     Patient: Lisa Angeles  : 1955    Beginning/End Dates of Reporting Period:  18 to 2018    Referring Provider: Dr. Ritter    Therapy Diagnosis: Chronic LBP with sciatica     Client Self Report: She was seen 0925 to 1000. She reports she is feeling\"pretty good\" back to her baseline level of pain. She reports the exercises really help as does her TENS unit.     Objective Measurements:  Objective Measure: Spinal mechanics  Details: no dysfxs in lumbar or cervical spine or SIJ  Objective Measure: Palpation  Details: mild soft tissue tension L levator scap mm                              Outcome Measures (most recent score):      Goals:  Goal Identifier S TG 1   Goal Description Decreased pain when at its worst to a 7/10   Target Date 18   Date Met  18   Progress:     Goal Identifier LTG 1   Goal Description She will demonstrate independence with home exercise program   Target Date 18   Date Met  18   Progress:     Goal Identifier LTG 2   Goal Description She will be able to walk 1+ miles   Target Date 18   Date Met  18   Progress:     Goal Identifier     Goal Description     Target Date     Date Met      Progress:     Goal Identifier     Goal Description     Target Date     Date Met      Progress:     Goal Identifier     Goal Description     Target Date     Date Met      Progress:     Goal Identifier     Goal Description     Target Date     Date Met      Progress:     Goal Identifier     Goal Description     Target Date     Date Met      Progress:     Progress Toward Goals:   Progress this reporting period: PT goals are met and she feels comfortable to progress to independent HEP          Plan:  Discharge from therapy.    Discharge:    Reason for Discharge: Patient has met all goals.    Equipment Issued: none    Discharge Plan: Patient to continue home program.  "

## 2019-01-03 ENCOUNTER — TELEPHONE (OUTPATIENT)
Dept: FAMILY MEDICINE | Facility: OTHER | Age: 64
End: 2019-01-03

## 2019-01-03 DIAGNOSIS — N95.1 MENOPAUSAL SYNDROME (HOT FLASHES): ICD-10-CM

## 2019-01-03 NOTE — TELEPHONE ENCOUNTER
Please refer to [phone] telephone encounter.Pt needs hard copy RX with order for gym membership d/t back pain with sciatica for 3 months.

## 2019-01-03 NOTE — TELEPHONE ENCOUNTER
10:34 AM    Reason for Call: Phone Call    Description: Patient needs a 3 month medical membership  to [employer] for back strengthening. Patient has been doing this for about 3-4 years and needs an updated membership every 3 months.      Was an appointment offered for this call? No  If yes : Appointment type              Date    Preferred method for responding to this message: Telephone Call  What is your phone number ?[phone]    If we cannot reach you directly, may we leave a detailed response at the number you provided? Yes    Can this message wait until your PCP/provider returns, if available today? Not applicable    Anna Lora MA

## 2019-01-04 RX ORDER — CONJUGATED ESTROGENS 0.3 MG/1
TABLET, FILM COATED ORAL
Qty: 30 TABLET | Refills: 0 | Status: SHIPPED | OUTPATIENT
Start: 2019-01-04 | End: 2019-01-29

## 2019-01-14 ENCOUNTER — TELEPHONE (OUTPATIENT)
Dept: FAMILY MEDICINE | Facility: OTHER | Age: 64
End: 2019-01-14

## 2019-01-14 DIAGNOSIS — M54.41 CHRONIC BILATERAL LOW BACK PAIN WITH RIGHT-SIDED SCIATICA: Primary | ICD-10-CM

## 2019-01-14 DIAGNOSIS — G89.29 CHRONIC BILATERAL LOW BACK PAIN WITH RIGHT-SIDED SCIATICA: Primary | ICD-10-CM

## 2019-01-14 NOTE — TELEPHONE ENCOUNTER
Patient called and she states she has a tens unit through [employer].It is old and she cannot get the pads for it anymore.She said that it might be over ten years old.She is not sure if you ordered it. Do not see order.[employer] [place] said they could try new pads or just get a new machine.Pended electrodes.She wants to be called on her cell when it is sent.Thank you. Should she get new one d/t age? Send to [employer].

## 2019-01-16 ENCOUNTER — ANCILLARY PROCEDURE (OUTPATIENT)
Dept: MAMMOGRAPHY | Facility: OTHER | Age: 64
End: 2019-01-16
Attending: FAMILY MEDICINE
Payer: MEDICARE

## 2019-01-16 DIAGNOSIS — Z12.31 VISIT FOR SCREENING MAMMOGRAM: ICD-10-CM

## 2019-01-16 PROCEDURE — 77063 BREAST TOMOSYNTHESIS BI: CPT | Mod: TC

## 2019-01-29 DIAGNOSIS — N95.1 MENOPAUSAL SYNDROME (HOT FLASHES): ICD-10-CM

## 2019-01-29 NOTE — TELEPHONE ENCOUNTER
Premarin  Last Written Prescription Date: 1/4/19  Last Fill Quantity: 30 # of Refills: 0  Last Office Visit: 11/9/18
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
## 2019-01-30 RX ORDER — CONJUGATED ESTROGENS 0.3 MG/1
TABLET, FILM COATED ORAL
Qty: 30 TABLET | Refills: 0 | Status: SHIPPED | OUTPATIENT
Start: 2019-01-30 | End: 2019-02-25

## 2019-02-05 DIAGNOSIS — M54.30 SCIATICA, UNSPECIFIED LATERALITY: ICD-10-CM

## 2019-02-05 DIAGNOSIS — M54.41 CHRONIC BILATERAL LOW BACK PAIN WITH RIGHT-SIDED SCIATICA: ICD-10-CM

## 2019-02-05 DIAGNOSIS — G89.29 CHRONIC BILATERAL LOW BACK PAIN WITH RIGHT-SIDED SCIATICA: ICD-10-CM

## 2019-02-05 RX ORDER — HYDROCODONE BITARTRATE AND ACETAMINOPHEN 7.5; 325 MG/1; MG/1
1 TABLET ORAL EVERY 6 HOURS PRN
Qty: 60 TABLET | Refills: 0 | Status: SHIPPED | OUTPATIENT
Start: 2019-02-05 | End: 2019-11-01

## 2019-02-05 RX ORDER — IBUPROFEN 800 MG/1
800 TABLET, FILM COATED ORAL EVERY 8 HOURS PRN
Qty: 90 TABLET | Refills: 0 | Status: SHIPPED | OUTPATIENT
Start: 2019-02-05 | End: 2020-08-12

## 2019-02-05 RX ORDER — DIAZEPAM 5 MG
5-10 TABLET ORAL EVERY 6 HOURS PRN
Qty: 40 TABLET | Refills: 0 | Status: SHIPPED | OUTPATIENT
Start: 2019-02-05 | End: 2020-04-02

## 2019-02-05 NOTE — TELEPHONE ENCOUNTER
Controlled Substance Refill Request for Norco and Valium    Problem List Complete:  No     PROVIDER TO CONSIDER COMPLETION OF PROBLEM LIST AND OVERVIEW/CONTROLLED SUBSTANCE AGREEMENT    Last Written Prescription Date:  See below.      Future Office visit:   Next 5 appointments (look out 90 days)    Feb 08, 2019  1:30 PM CST  (Arrive by 1:15 PM)  PHYSICAL with Carolny Ritter MD  [facility] ([facility] ) [address] [address]  [phone]          Controlled substance agreement:   Encounter-Level CSA:    There are no encounter-level csa.     Patient-Level CSA:    There are no patient-level csa.         Last Urine Drug Screen: No results found for: CDAUT, No results found for: COMDAT, No results found for: THC13, PCP13, COC13, MAMP13, OPI13, AMP13, BZO13, TCA13, MTD13, BAR13, OXY13, PPX13, BUP13     Processing:  Fax Rx to Valium to [facility] pharmacy     [email or web address]       checked in past 3 months?  No, route to RN

## 2019-02-05 NOTE — TELEPHONE ENCOUNTER
Valium       Last Written Prescription Date:  7/3/18  Last Fill Quantity: 40,   # refills: 0  Last Office Visit: 11/9/18  Future Office visit:    Next 5 appointments (look out 90 days)    Feb 08, 2019  1:30 PM CST  (Arrive by 1:15 PM)  PHYSICAL with Carolyn Ritter MD  [facility] ([facility] ) [address]  [place] [address]  [phone]           Routing refill request to provider for review/approval because:  Drug not on the [facility], [facility] or [facility] refill protocol or controlled substance      Ibuprofen       Last Written Prescription Date:  10/12/17  Last Fill Quantity: 90,   # refills: 3  Last Office Visit: 11/9/18  Future Office visit:    Next 5 appointments (look out 90 days)    Feb 08, 2019  1:30 PM CST  (Arrive by 1:15 PM)  PHYSICAL with Carolyn Ritter MD  [facility] ([facility] ) [address]  [place] [address]  [phone]           Routing refill request to provider for review/approval because:  Drug not on the [facility], [facility] or [facility] refill protocol or controlled substance      norco       Last Written Prescription Date:  7/3/18  Last Fill Quantity: 60,   # refills: 0  Last Office Visit: 11/9/18  Future Office visit:    Next 5 appointments (look out 90 days)    Feb 08, 2019  1:30 PM CST  (Arrive by 1:15 PM)  PHYSICAL with Carolyn Ritter MD  [facility] ([facility] ) [address]  [place] [address]  [phone]           Routing refill request to provider for review/approval because:  Drug not on the [facility], [facility] or [facility] refill protocol or controlled substance

## 2019-02-08 ENCOUNTER — OFFICE VISIT (OUTPATIENT)
Dept: FAMILY MEDICINE | Facility: OTHER | Age: 64
End: 2019-02-08
Attending: FAMILY MEDICINE
Payer: COMMERCIAL

## 2019-02-08 VITALS
OXYGEN SATURATION: 98 % | DIASTOLIC BLOOD PRESSURE: 68 MMHG | SYSTOLIC BLOOD PRESSURE: 130 MMHG | WEIGHT: 154 LBS | HEIGHT: 60 IN | TEMPERATURE: 98.3 F | HEART RATE: 78 BPM | RESPIRATION RATE: 16 BRPM | BODY MASS INDEX: 30.23 KG/M2

## 2019-02-08 DIAGNOSIS — Z00.00 ROUTINE GENERAL MEDICAL EXAMINATION AT A HEALTH CARE FACILITY: Primary | ICD-10-CM

## 2019-02-08 DIAGNOSIS — I10 BENIGN ESSENTIAL HYPERTENSION: ICD-10-CM

## 2019-02-08 DIAGNOSIS — Z12.11 SPECIAL SCREENING FOR MALIGNANT NEOPLASMS, COLON: ICD-10-CM

## 2019-02-08 DIAGNOSIS — E78.5 HYPERLIPIDEMIA, UNSPECIFIED HYPERLIPIDEMIA TYPE: ICD-10-CM

## 2019-02-08 PROCEDURE — 99396 PREV VISIT EST AGE 40-64: CPT | Performed by: FAMILY MEDICINE

## 2019-02-08 ASSESSMENT — MIFFLIN-ST. JEOR: SCORE: 1170.04

## 2019-02-08 ASSESSMENT — PAIN SCALES - GENERAL: PAINLEVEL: MILD PAIN (3)

## 2019-02-08 NOTE — NURSING NOTE
Chief Complaint   Patient presents with     Physical       Initial /68 (BP Location: Left arm, Patient Position: Sitting, Cuff Size: Adult Regular)   Pulse 78   Temp 98.3  F (36.8  C) (Tympanic)   Resp 16   Ht 1.524 m (5')   Wt 69.9 kg (154 lb)   SpO2 98%   BMI 30.08 kg/m   Estimated body mass index is 30.08 kg/m  as calculated from the following:    Height as of this encounter: 1.524 m (5').    Weight as of this encounter: 69.9 kg (154 lb).  Medication Reconciliation: complete    Jeni Byrne MA

## 2019-02-11 ENCOUNTER — TELEPHONE (OUTPATIENT)
Dept: SURGERY | Facility: OTHER | Age: 64
End: 2019-02-11

## 2019-02-11 DIAGNOSIS — I10 BENIGN ESSENTIAL HYPERTENSION: ICD-10-CM

## 2019-02-11 DIAGNOSIS — Z12.11 SPECIAL SCREENING FOR MALIGNANT NEOPLASMS, COLON: Primary | ICD-10-CM

## 2019-02-11 DIAGNOSIS — E78.5 HYPERLIPIDEMIA, UNSPECIFIED HYPERLIPIDEMIA TYPE: ICD-10-CM

## 2019-02-11 LAB
ANION GAP SERPL CALCULATED.3IONS-SCNC: 9 MMOL/L (ref 3–14)
BUN SERPL-MCNC: 16 MG/DL (ref 7–30)
CALCIUM SERPL-MCNC: 9.2 MG/DL (ref 8.5–10.1)
CHLORIDE SERPL-SCNC: 106 MMOL/L (ref 94–109)
CHOLEST SERPL-MCNC: 232 MG/DL
CO2 SERPL-SCNC: 26 MMOL/L (ref 20–32)
CREAT SERPL-MCNC: 0.72 MG/DL (ref 0.52–1.04)
GFR SERPL CREATININE-BSD FRML MDRD: 88 ML/MIN/{1.73_M2}
GLUCOSE SERPL-MCNC: 93 MG/DL (ref 70–99)
HDLC SERPL-MCNC: 74 MG/DL
LDLC SERPL CALC-MCNC: 128 MG/DL
NONHDLC SERPL-MCNC: 158 MG/DL
POTASSIUM SERPL-SCNC: 3.9 MMOL/L (ref 3.4–5.3)
SODIUM SERPL-SCNC: 141 MMOL/L (ref 133–144)
TRIGL SERPL-MCNC: 150 MG/DL

## 2019-02-11 PROCEDURE — 36415 COLL VENOUS BLD VENIPUNCTURE: CPT | Mod: ZL | Performed by: FAMILY MEDICINE

## 2019-02-11 PROCEDURE — 80048 BASIC METABOLIC PNL TOTAL CA: CPT | Mod: ZL | Performed by: FAMILY MEDICINE

## 2019-02-11 PROCEDURE — 80061 LIPID PANEL: CPT | Mod: ZL | Performed by: FAMILY MEDICINE

## 2019-02-11 NOTE — LETTER
February 12, 2019          Lisa nAgeles  [address] [place] [address]        Dear Lisa,       At [facility], we want to make sure that your procedure is as pleasant as possible. This guide is designed to answer any questions you might have and to walk you through the preparations you will need to make before your procedure. Should you have additional questions, please feel free to contact us. Contact numbers are listed below. Thank you for choosing [facility].    Clinic Health Unit Coordinator: [phone]  Clinic Nurse: [phone]  Surgery Education Nurse: [phone]    Date of Procedure: 2/21/19 with Dr. Soto  Admit Time: Surgery Department will call you the day before your procedure by 5pm with your arrival time. If your surgery is on Monday, expect a call on Friday. If we were unable to reach you before 5PM, you may call admitting at [phone].    All questions or concerns can be directed to the clinic or surgery education nurse. If you have a scheduling or appointment question, or need to postpone your procedure, please call the Health Unit Coordinator between 8am and 4pm Monday through Friday. After hours or on weekends, please call [phone] to postpone.     Call the surgery nurse or your primary care provider if you should become ill within 1-2 weeks of your procedure and we will reschedule it when you are healthy. This includes signs or symptoms of a cold or the flu, fever, chills, sore throat, cough, chest congestion, productive cough, runny nose, cold symptoms or any other symptoms of illness.     EKG test(s) needed prior to surgery. Please come to any of the [facility] at your convenience during regular business hours (weekdays 8am-4:45pm except major holidays). You do not need to call ahead for an appointment. Your orders have already been placed.        COLONOSCOPY PREP    7 DAYS BEFORE THE EXAM:  Do not take Aspirin or other NSAIDS (Ibuprofen,  "Motrin, Aleve, Celebrex, Naproxen, etc) 7 days before your surgery. Tylenol is fine.  Stop taking fiber supplements, vitamins, and iron. Stop eating corn, nuts and seeds.    Please call the Surgery Education Nurse at [phone] 1-2 weeks before your procedure and have an allergy and medication list ready     Arrange transportation with a responsible adult to drive you home and to stay with you for the next 4 hours after you arrive home for your safety. If you need to take a taxi or the bus, you must have a responsible adult to ride with you other than the . You will be cancelled if you do not have a responsible adult.    You have been ordered Gatorade Prep as your mechanical bowel prep. This is over the counter and does not require a prescription. Please purchase the following items:    Two 5 mg Dulcolax (bisacodyl) tablets   One 8.3 ounce (238 g) bottle of miralax   One 10 ounce bottle of magnesium citrate   One 64 ounce bottle of gatorade-No red or purple, not powdered.    2 DAYS BEFORE THE EXAM:   Begin a low fiber diet. No raw fruits and vegetables or whole grains. Please see the list of foods you can have and foods to avoid on page 3 of the separate \"Miralax, Dulcolax and Magnesium Citrate\" colonoscopy instruction packet. Drink at least 4-6 large glasses of sports drink each day until the procedure (not red or purple).    1 DAY BEFORE THE EXAM:  No solid food or milk products after 12:00 AM (midnight). Drink only clear liquids all day, at least 8-10 glasses. Please see the list of liquids you can have and what to avoid on page 2 of the separate \"Miralax, Dulcolax and Magnesium Citrate\" colonoscopy instruction packet.  No red or purple colors, milk products, or alcohol.     AT 12:00 PM NOON THE DAY BEFORE EXAM:  Take 2 Dulcolax tablets by mouth with clear liquids.    AT 6:00 PM THE DAY BEFORE EXAM:  Mix the bottle of Miralax and 64 ounces of Gatorade in a pitcher. Drink one 8 ounce glass every 15 minutes " "until gone. Stay near a toilet. You will have watery stools and may have cramping, bloating and nausea.    DAY OF COLONOSCOPY EXAM:   6 HOURS PRIOR TO EXAM DAY OF PROCEDURE:  Drink the bottle of magnesium citrate. Right after drinking this, drink one full glass of water. You many have clear liquids up until 3 hours before you check in at admitting.  If you need to take any medications after this, take them with a tiny sip of water. If you have asthma, bring your inhaler with you.    Wear comfortable clothes. No jewelry, body piercings, make-up, nail polish, hair spray, lotions, perfumes or colognes. Shower before you arrive. [place] in Admitting through the [facility]. You must have a  with you and and adult available to stay with your for 4 hours at home. The medicine used in this test will make you sleepy. If you do not have someone, please reschedule or your test will be cancelled.  It is recommended that you do not drive for 24 hours after your test. Do not operate power equipment, drink alcohol, make important decisions or sign legal documents.     COLONOSCOPY FREQUENTLY ASKED QUESTIONS  What is a colonoscopy?    A colonoscopy is a test to look at the lining of your large intestine. The purpose of the exam is to check for abnormalities including growths called \"polyps\" that can lead to serious disease. A flexible scope is inserted into your rectum by the doctor to examine your large intestine.    What are polyps?  Polyps are abnormal growths on the lining of the colon. Most polyps are not cancerous, but some polyps have the potential to turn into cancer with time. Polyps can also bleed. For these reasons, most polyps are removed during a colonoscopy and sent to the laboratory for microscopic examination.    What preparation is needed?  The colon must be completely clean for the procedure to be performed. You may be given one or two different prep solutions to cleanse your bowel. You will also need to " follow a clear liquid diet the day before your procedure.    What happens after the procedure?  After your procedure is complete, you will be taken back to your day surgery room where you will be monitored for approximately 1 hour. You can expect to feel drowsy for several hours afterward. You may experience some cramping or bloating due to the air introduced into your colon during the exam. You will not be able to drive or operate machinery the rest of the day. You will be given written discharge instructions and appropriate learning material before you go home. You must have an adult to stay at home with you for the next 4 hours after you leave the hospital for your safety.    When will I find out the results of my test?  Your surgeon will talk to you and your designated  before you leave and usually the preliminary results can be given to you at that time. If a biopsy was taken during your procedure, it will be sent to the laboratory for examination. Results usually take one week. You will be contacted by phone or by letter with results.      TIPS FOR COLON CLEANSING BEFORE YOUR COLONOSCOPY  To get accurate results from your exam, your colon must be completely clean and empty. Please follow your doctor's instructions. If you do not, you may need to repeat both the exam and colon-cleansing process.    The medicine you take may cause bloating, nausea and other discomfort. Follow these tips to make the process as easy as possible:     You may use alcohol-free baby wipes to ease anal irritation. You may also use Vaseline to help protect the skin. Other options include Tucks wipes, hemorrhoid treatments and hydrocortisone cream.     You may wish to squeeze some lemon juice into your preparation or add a packet of Crystal Light lemon-lime or ice tea flavor. (remember to not use red or purple)    To chill the solution, put it in your refrigerator or set it in a bowl of ice. Do not add ice in your glass. You may  remove the preparation from the refrigerator 15-30 minutes before drinking.    Quickly drink one whole glass every 5 to 10 minutes. It may help to use a timer. If the liquid is too salty, use a straw.    Stay near a toilet! You will have diarrhea (loose watery stools) and may also have chills. Dress for comfort. Expect to feel discomfort until the stool clears from your colon. This usually takes about 2 to 4 hours.    Even when you are sitting on the toilet, keep drinking a glass of solution every 10-15 minutes. If you have nausea or vomiting, rinse your mouth with water. Take a break for 15 to 30 minutes, and then keep drinking the solution.    Some people find it helpful to suck on a wedge of lemon or lime. You may also try sucking on hard candy (not red or purple) or washing your mouth out with water, clear soda or mouthwash.    If you followed your doctor's orders and your stool is a clear or yellow liquid, you are ready for the exam. If you are not sure if your colon is clean, please call your clinic and ask to speak to a nurse.               Sincerely,        Gurwinder Soto MD/Christine GASPAR LPN

## 2019-02-11 NOTE — TELEPHONE ENCOUNTER
Referral received for meet and greet colonoscopy from Dr. Marx.1st attempt to call patient to schedule. No answer. 2/11/19    Telephone#; [phone]  Insurance: [facility], Medicare  Pharmacy: [facility]    NEEDS: JAMES Alvarez LPN

## 2019-02-12 NOTE — TELEPHONE ENCOUNTER
Referral received for meet and greet colonoscopy. Patient scheduled for colonoscopy on 2/21/19 with Dr. Soto at [facility] with Gatorade prep. Instructions given via phone and instructions faxed to [facility] for patient to  and mailed to patient with surgery handbook. Instructed patient to come to the [facility] during clinic hours for EKG per anesthesia preoperative protocols prior to surgery day--in the next few days. Orders done.

## 2019-02-20 ENCOUNTER — ANESTHESIA EVENT (OUTPATIENT)
Dept: SURGERY | Facility: HOSPITAL | Age: 64
End: 2019-02-20
Payer: MEDICARE

## 2019-02-20 ASSESSMENT — LIFESTYLE VARIABLES: TOBACCO_USE: 1
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
## 2019-02-21 ENCOUNTER — HOSPITAL ENCOUNTER (OUTPATIENT)
Facility: HOSPITAL | Age: 64
Discharge: HOME | End: 2019-02-21
Attending: SURGERY | Admitting: SURGERY
Payer: MEDICARE

## 2019-02-21 ENCOUNTER — ANESTHESIA (OUTPATIENT)
Dept: SURGERY | Facility: HOSPITAL | Age: 64
End: 2019-02-21
Payer: MEDICARE

## 2019-02-21 VITALS
WEIGHT: 152 LBS | OXYGEN SATURATION: 99 % | HEIGHT: 61 IN | SYSTOLIC BLOOD PRESSURE: 131 MMHG | DIASTOLIC BLOOD PRESSURE: 73 MMHG | RESPIRATION RATE: 18 BRPM | TEMPERATURE: 98 F | BODY MASS INDEX: 28.7 KG/M2

## 2019-02-21 PROCEDURE — 45378 DIAGNOSTIC COLONOSCOPY: CPT | Performed by: ANESTHESIOLOGY

## 2019-02-21 PROCEDURE — 25000128 H RX IP 250 OP 636: Performed by: NURSE ANESTHETIST, CERTIFIED REGISTERED

## 2019-02-21 PROCEDURE — 25000125 ZZHC RX 250: Performed by: NURSE ANESTHETIST, CERTIFIED REGISTERED

## 2019-02-21 PROCEDURE — 37000008 ZZH ANESTHESIA TECHNICAL FEE, 1ST 30 MIN: Performed by: SURGERY

## 2019-02-21 PROCEDURE — 25800030 ZZH RX IP 258 OP 636: Performed by: ANESTHESIOLOGY

## 2019-02-21 PROCEDURE — 45378 DIAGNOSTIC COLONOSCOPY: CPT | Performed by: NURSE ANESTHETIST, CERTIFIED REGISTERED

## 2019-02-21 PROCEDURE — G0121 COLON CA SCRN NOT HI RSK IND: HCPCS | Performed by: SURGERY

## 2019-02-21 PROCEDURE — 71000027 ZZH RECOVERY PHASE 2 EACH 15 MINS: Performed by: SURGERY

## 2019-02-21 PROCEDURE — 36000050 ZZH SURGERY LEVEL 2 1ST 30 MIN: Performed by: SURGERY

## 2019-02-21 PROCEDURE — 40000305 ZZH STATISTIC PRE PROC ASSESS I: Performed by: SURGERY

## 2019-02-21 RX ORDER — FENTANYL CITRATE 50 UG/ML
INJECTION, SOLUTION INTRAMUSCULAR; INTRAVENOUS PRN
Status: DISCONTINUED | OUTPATIENT
Start: 2019-02-21 | End: 2019-02-21

## 2019-02-21 RX ORDER — NALOXONE HYDROCHLORIDE 0.4 MG/ML
.1-.4 INJECTION, SOLUTION INTRAMUSCULAR; INTRAVENOUS; SUBCUTANEOUS
Status: CANCELLED | OUTPATIENT
Start: 2019-02-21 | End: 2019-02-22

## 2019-02-21 RX ORDER — MEPERIDINE HYDROCHLORIDE 50 MG/ML
12.5 INJECTION INTRAMUSCULAR; INTRAVENOUS; SUBCUTANEOUS
Status: DISCONTINUED | OUTPATIENT
Start: 2019-02-21 | End: 2019-02-21 | Stop reason: HOSPADM

## 2019-02-21 RX ORDER — FLUMAZENIL 0.1 MG/ML
0.2 INJECTION, SOLUTION INTRAVENOUS
Status: CANCELLED | OUTPATIENT
Start: 2019-02-21 | End: 2019-02-22

## 2019-02-21 RX ORDER — FLUMAZENIL 0.1 MG/ML
0.2 INJECTION, SOLUTION INTRAVENOUS
Status: DISCONTINUED | OUTPATIENT
Start: 2019-02-21 | End: 2019-02-21 | Stop reason: HOSPADM

## 2019-02-21 RX ORDER — PROPOFOL 10 MG/ML
INJECTION, EMULSION INTRAVENOUS PRN
Status: DISCONTINUED | OUTPATIENT
Start: 2019-02-21 | End: 2019-02-21

## 2019-02-21 RX ORDER — LIDOCAINE 40 MG/G
CREAM TOPICAL
Status: DISCONTINUED | OUTPATIENT
Start: 2019-02-21 | End: 2019-02-21 | Stop reason: HOSPADM

## 2019-02-21 RX ORDER — ONDANSETRON 2 MG/ML
4 INJECTION INTRAMUSCULAR; INTRAVENOUS EVERY 30 MIN PRN
Status: DISCONTINUED | OUTPATIENT
Start: 2019-02-21 | End: 2019-02-21 | Stop reason: HOSPADM

## 2019-02-21 RX ORDER — LIDOCAINE HYDROCHLORIDE 20 MG/ML
INJECTION, SOLUTION INFILTRATION; PERINEURAL PRN
Status: DISCONTINUED | OUTPATIENT
Start: 2019-02-21 | End: 2019-02-21

## 2019-02-21 RX ORDER — ONDANSETRON 4 MG/1
4 TABLET, ORALLY DISINTEGRATING ORAL EVERY 30 MIN PRN
Status: DISCONTINUED | OUTPATIENT
Start: 2019-02-21 | End: 2019-02-21 | Stop reason: HOSPADM

## 2019-02-21 RX ORDER — NALOXONE HYDROCHLORIDE 0.4 MG/ML
.1-.4 INJECTION, SOLUTION INTRAMUSCULAR; INTRAVENOUS; SUBCUTANEOUS
Status: DISCONTINUED | OUTPATIENT
Start: 2019-02-21 | End: 2019-02-21 | Stop reason: HOSPADM

## 2019-02-21 RX ORDER — SODIUM CHLORIDE, SODIUM LACTATE, POTASSIUM CHLORIDE, CALCIUM CHLORIDE 600; 310; 30; 20 MG/100ML; MG/100ML; MG/100ML; MG/100ML
INJECTION, SOLUTION INTRAVENOUS CONTINUOUS
Status: DISCONTINUED | OUTPATIENT
Start: 2019-02-21 | End: 2019-02-21 | Stop reason: HOSPADM

## 2019-02-21 RX ADMIN — PROPOFOL 50 MG: 10 INJECTION, EMULSION INTRAVENOUS at 13:20

## 2019-02-21 RX ADMIN — PROPOFOL 50 MG: 10 INJECTION, EMULSION INTRAVENOUS at 13:10

## 2019-02-21 RX ADMIN — PROPOFOL 50 MG: 10 INJECTION, EMULSION INTRAVENOUS at 13:15

## 2019-02-21 RX ADMIN — SODIUM CHLORIDE, POTASSIUM CHLORIDE, SODIUM LACTATE AND CALCIUM CHLORIDE: 600; 310; 30; 20 INJECTION, SOLUTION INTRAVENOUS at 12:55

## 2019-02-21 RX ADMIN — PROPOFOL 50 MG: 10 INJECTION, EMULSION INTRAVENOUS at 13:06

## 2019-02-21 RX ADMIN — LIDOCAINE HYDROCHLORIDE 40 MG: 20 INJECTION, SOLUTION INFILTRATION; PERINEURAL at 13:06

## 2019-02-21 RX ADMIN — PROPOFOL 50 MG: 10 INJECTION, EMULSION INTRAVENOUS at 13:23

## 2019-02-21 RX ADMIN — FENTANYL CITRATE 100 MCG: 50 INJECTION, SOLUTION INTRAMUSCULAR; INTRAVENOUS at 13:06

## 2019-02-21 ASSESSMENT — MIFFLIN-ST. JEOR: SCORE: 1176.85

## 2019-02-21 NOTE — BRIEF OP NOTE
[facility]    Brief Operative Note    Pre-operative diagnosis: COLON CANCER SCREENING  Post-operative diagnosis Clear colon   Procedure: Procedure(s):  COLONOSCOPY  Surgeon: Surgeon(s) and Role:     * Gurwinder Soto MD - Primary  Anesthesia: Monitor Anesthesia Care   Estimated blood loss: None  Drains: None  Specimens: * No specimens in log *  Findings:   Normal colon .  Complications: None.  Implants: None.

## 2019-02-21 NOTE — OP NOTE
Lisa Angeles MRN# [id number]   YOB: 1955 Age: 64 year old      Date of Admission:  2/21/2019  Date of Service:   2/21/19    Primary care provider: Carolyn Ritter    PREOPERATIVE DIAGNOSIS:  Colon Cancer Screening        POSTOPERATIVE DIAGNOSIS:  Same          PROCEDURE:  Colonoscopy            INDICATIONS:  Screening colonoscopy.      Specimen: * No specimens in log *    SURGEON: Gurwinder Soto    DESCRIPTION OF PROCEDURE: Lisa Angeles was brought into the endoscopy suite and placed in the left lateral decubitus position. After preprocedural pause and attended monitored anesthesia was administered, the external anus was inspected and was noted to be a cystic lesion on the right buttock off the midline. There was no pit at the midline. There is no induration of the anal canal.  Digital rectal exam was normal. The colonoscope was inserted and advanced under direct visualization to the level of the cecum which was identified by the appendiceal orifice and the ileocecal valve. The prep was excellent.. Upon slow withdrawal of the colonoscope, approximately 95% of the mucosa was directly visualized.  There was no mucosal abnormality. The rest of the colon was without mucosal abnormality. There was no evidence of further polyps, inflammation, bleeding or AVMs. Retroflexion of the rectum was normal. The extra air was removed from the colon, and the colonoscope withdrawn. The patient tolerated the procedure well and was taken to postanesthesia care unit.     We invite the patient to return in 10 years for follow up screening evaluation.    Gurwinder Soto

## 2019-02-21 NOTE — BRIEF OP NOTE
[facility]    Brief Operative Note    Pre-operative diagnosis: COLON CANCER SCREENING  Post-operative diagnosis Normal colon   Procedure: Procedure(s):  COLONOSCOPY  Surgeon: Surgeon(s) and Role:     * Gurwinder Soto MD - Primary  Anesthesia: Monitor Anesthesia Care   Estimated blood loss: None  Drains: None  Specimens: * No specimens in log *  Findings:   Sigmoid changes of radiation, no mass in cecum, prominent IC valve, diverticulosis .  Complications: None.  Implants: None.

## 2019-02-21 NOTE — ANESTHESIA CARE TRANSFER NOTE
Patient: Lisa Angeles    Procedure(s):  COLONOSCOPY    Diagnosis: COLON CANCER SCREENING  Diagnosis Additional Information: No value filed.    Anesthesia Type:   MAC     Note:  Airway :Nasal Cannula  Patient transferred to:Phase II  Handoff Report: Identifed the Patient, Identified the Reponsible Provider, Reviewed the pertinent medical history, Discussed the surgical course, Reviewed Intra-OP anesthesia mangement and issues during anesthesia, Set expectations for post-procedure period and Allowed opportunity for questions and acknowledgement of understanding      Vitals: (Last set prior to Anesthesia Care Transfer)    CRNA VITALS  2/21/2019 1254 - 2/21/2019 1327      2/21/2019             Pulse:  79    Ht Rate:  79    SpO2:  98 %    Resp Rate (set):  8                Electronically Signed By: ROSELYN Tabor CRNA  February 21, 2019  1:27 PM

## 2019-02-21 NOTE — OR NURSING
Patient and responsible adult given discharge instructions with no questions regarding instructions. David score 19. Pain level 4/10, chronic back pain.  Discharged from unit via ambulation. Patient discharged to home with .
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
## 2019-02-21 NOTE — ANESTHESIA POSTPROCEDURE EVALUATION
Patient: Lisa Angeles    Procedure(s):  COLONOSCOPY    Diagnosis:COLON CANCER SCREENING  Diagnosis Additional Information: No value filed.    Anesthesia Type:  MAC    Note:  Anesthesia Post Evaluation    Patient location during evaluation: Phase 2 and Bedside  Patient participation: Able to fully participate in evaluation  Level of consciousness: awake and alert  Pain management: adequate  Airway patency: patent  Cardiovascular status: acceptable  Respiratory status: acceptable  Hydration status: stable  PONV: none     Anesthetic complications: None          Last vitals:  Vitals:    02/21/19 1141   BP: 129/74   Resp: 16   Temp: 97.2  F (36.2  C)   SpO2: 97%         Electronically Signed By: Timbo Iraheta MD  February 21, 2019  1:36 PM

## 2019-02-21 NOTE — DISCHARGE INSTRUCTIONS

## 2019-04-04 ENCOUNTER — TELEPHONE (OUTPATIENT)
Dept: FAMILY MEDICINE | Facility: OTHER | Age: 64
End: 2019-04-04

## 2019-04-04 NOTE — TELEPHONE ENCOUNTER
States she needs a RX for medical back strengthening to [employer]/gym membership.  Dx back pain with sciatica. States this is through work comp. States this is hand written RX to be picked up at the  in [facility]. This has been for 3 months wondering if it can read 6 months.

## 2019-04-29 DIAGNOSIS — N95.1 MENOPAUSAL SYNDROME (HOT FLASHES): ICD-10-CM

## 2019-05-14 ENCOUNTER — OFFICE VISIT (OUTPATIENT)
Dept: DERMATOLOGY | Facility: OTHER | Age: 64
End: 2019-05-14
Attending: DERMATOLOGY
Payer: COMMERCIAL

## 2019-05-14 VITALS
OXYGEN SATURATION: 91 % | HEART RATE: 86 BPM | SYSTOLIC BLOOD PRESSURE: 122 MMHG | DIASTOLIC BLOOD PRESSURE: 62 MMHG | TEMPERATURE: 97.8 F

## 2019-05-14 DIAGNOSIS — D22.9 MULTIPLE BENIGN NEVI: ICD-10-CM

## 2019-05-14 DIAGNOSIS — Z85.828 HISTORY OF NONMELANOMA SKIN CANCER: Primary | ICD-10-CM

## 2019-05-14 DIAGNOSIS — Z12.83 SCREENING FOR SKIN CANCER: ICD-10-CM

## 2019-05-14 PROCEDURE — G0463 HOSPITAL OUTPT CLINIC VISIT: HCPCS

## 2019-05-14 PROCEDURE — 99213 OFFICE O/P EST LOW 20 MIN: CPT | Performed by: DERMATOLOGY

## 2019-05-14 ASSESSMENT — PAIN SCALES - GENERAL: PAINLEVEL: NO PAIN (0)

## 2019-05-14 NOTE — LETTER
5/14/2019       RE: Lisa Angeles  [address] [place] [address]     Dear Colleague,    Thank you for referring your patient, Lisa Angeles, to the [facility]. Please see a copy of my visit note below.    done    Again, thank you for allowing me to participate in the care of your patient.      Sincerely,    CHASITY Bell MD

## 2019-05-14 NOTE — NURSING NOTE
"Chief Complaint   Patient presents with     Skin Check     Last seen 02/19/2017       Initial /62   Pulse 86   Temp 97.8  F (36.6  C) (Tympanic)   SpO2 91%  Estimated body mass index is 28.72 kg/m  as calculated from the following:    Height as of 2/21/19: 1.549 m (5' 1\").    Weight as of 2/21/19: 68.9 kg (152 lb).  Medication Reconciliation: complete    Cierra John LPN    "
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
## 2019-05-20 DIAGNOSIS — N95.1 MENOPAUSAL SYNDROME (HOT FLASHES): ICD-10-CM

## 2019-05-21 RX ORDER — CONJUGATED ESTROGENS 0.3 MG/1
TABLET, FILM COATED ORAL
Qty: 30 TABLET | Refills: 3 | Status: SHIPPED | OUTPATIENT
Start: 2019-05-21 | End: 2019-09-23

## 2019-07-22 DIAGNOSIS — N95.1 MENOPAUSAL SYNDROME (HOT FLASHES): ICD-10-CM

## 2019-07-22 NOTE — TELEPHONE ENCOUNTER
Progesterone  Last Written Prescription Date: 4/29/19  Last Fill Quantity: 90 # of Refills: 0  Last Office Visit: 2/8/19

## 2019-08-19 ENCOUNTER — OFFICE VISIT (OUTPATIENT)
Dept: FAMILY MEDICINE | Facility: OTHER | Age: 64
End: 2019-08-19
Attending: FAMILY MEDICINE
Payer: MEDICARE

## 2019-08-19 VITALS
DIASTOLIC BLOOD PRESSURE: 66 MMHG | TEMPERATURE: 98.6 F | BODY MASS INDEX: 29.38 KG/M2 | WEIGHT: 155.6 LBS | SYSTOLIC BLOOD PRESSURE: 148 MMHG | HEART RATE: 89 BPM | OXYGEN SATURATION: 98 % | HEIGHT: 61 IN | RESPIRATION RATE: 18 BRPM

## 2019-08-19 DIAGNOSIS — R60.0 PERIPHERAL EDEMA: ICD-10-CM

## 2019-08-19 DIAGNOSIS — I10 BENIGN ESSENTIAL HYPERTENSION: Primary | ICD-10-CM

## 2019-08-19 PROCEDURE — 80053 COMPREHEN METABOLIC PANEL: CPT | Mod: ZL | Performed by: FAMILY MEDICINE

## 2019-08-19 PROCEDURE — 36415 COLL VENOUS BLD VENIPUNCTURE: CPT | Mod: ZL | Performed by: FAMILY MEDICINE

## 2019-08-19 PROCEDURE — 99213 OFFICE O/P EST LOW 20 MIN: CPT | Performed by: FAMILY MEDICINE

## 2019-08-19 PROCEDURE — G0463 HOSPITAL OUTPT CLINIC VISIT: HCPCS

## 2019-08-19 RX ORDER — HYDROCHLOROTHIAZIDE 25 MG/1
25 TABLET ORAL DAILY
Qty: 30 TABLET | Refills: 5 | Status: SHIPPED | OUTPATIENT
Start: 2019-08-19 | End: 2020-02-11

## 2019-08-19 RX ORDER — HYDROCHLOROTHIAZIDE 25 MG/1
25 TABLET ORAL DAILY
Qty: 30 TABLET | Refills: 5 | Status: SHIPPED | OUTPATIENT
Start: 2019-08-19 | End: 2019-08-19

## 2019-08-19 ASSESSMENT — MIFFLIN-ST. JEOR: SCORE: 1193.18

## 2019-08-19 ASSESSMENT — PAIN SCALES - GENERAL: PAINLEVEL: MILD PAIN (3)

## 2019-08-19 NOTE — NURSING NOTE
"Chief Complaint   Patient presents with     Leg Swelling       Initial BP (!) 148/66 (BP Location: Right arm, Patient Position: Sitting, Cuff Size: Adult Regular)   Pulse 89   Temp 98.6  F (37  C) (Tympanic)   Resp 18   Ht 1.549 m (5' 1\")   Wt 70.6 kg (155 lb 9.6 oz)   SpO2 98%   BMI 29.40 kg/m   Estimated body mass index is 29.4 kg/m  as calculated from the following:    Height as of this encounter: 1.549 m (5' 1\").    Weight as of this encounter: 70.6 kg (155 lb 9.6 oz).  Medication Reconciliation: complete  "
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
## 2019-08-19 NOTE — PATIENT INSTRUCTIONS
Around 15% of women taking Norvasc (amlodipine) with have peripheral edema (swelling in their extremities).

## 2019-08-20 LAB
ALBUMIN SERPL-MCNC: 3.8 G/DL (ref 3.4–5)
ALP SERPL-CCNC: 82 U/L (ref 40–150)
ALT SERPL W P-5'-P-CCNC: 31 U/L (ref 0–50)
ANION GAP SERPL CALCULATED.3IONS-SCNC: 6 MMOL/L (ref 3–14)
AST SERPL W P-5'-P-CCNC: 18 U/L (ref 0–45)
BILIRUB SERPL-MCNC: 0.3 MG/DL (ref 0.2–1.3)
BUN SERPL-MCNC: 13 MG/DL (ref 7–30)
CALCIUM SERPL-MCNC: 9.6 MG/DL (ref 8.5–10.1)
CHLORIDE SERPL-SCNC: 106 MMOL/L (ref 94–109)
CO2 SERPL-SCNC: 30 MMOL/L (ref 20–32)
CREAT SERPL-MCNC: 1.04 MG/DL (ref 0.52–1.04)
GFR SERPL CREATININE-BSD FRML MDRD: 57 ML/MIN/{1.73_M2}
GLUCOSE SERPL-MCNC: 148 MG/DL (ref 70–99)
POTASSIUM SERPL-SCNC: 3.4 MMOL/L (ref 3.4–5.3)
PROT SERPL-MCNC: 7.3 G/DL (ref 6.8–8.8)
SODIUM SERPL-SCNC: 142 MMOL/L (ref 133–144)

## 2019-09-23 DIAGNOSIS — N95.1 MENOPAUSAL SYNDROME (HOT FLASHES): ICD-10-CM

## 2019-09-25 NOTE — TELEPHONE ENCOUNTER
Premarin   Last Written Prescription Date:  5/21/2019  Last Fill Quantity: 30,   # refills: 3  Last Office Visit: 2/8/2019  Future Office visit:       Routing refill request to provider for review/approval because:  Drug not on the [facility], [facility] or [facility] refill protocol or controlled substance

## 2019-09-26 RX ORDER — CONJUGATED ESTROGENS 0.3 MG/1
TABLET, FILM COATED ORAL
Qty: 30 TABLET | Refills: 3 | Status: SHIPPED | OUTPATIENT
Start: 2019-09-26 | End: 2019-12-20

## 2019-10-02 ENCOUNTER — TELEPHONE (OUTPATIENT)
Dept: FAMILY MEDICINE | Facility: OTHER | Age: 64
End: 2019-10-02

## 2019-10-02 NOTE — TELEPHONE ENCOUNTER
Patient called wondering if provider could write a year long order for [employer] for patient. Patient states if it is ordered by provider workers comp will pay for membership.      Patient states order can be faxed to the following number:  [phone]    Patient can be reached at the following number with any questions:  [phone]

## 2019-10-03 DIAGNOSIS — R60.0 PERIPHERAL EDEMA: ICD-10-CM

## 2019-10-03 DIAGNOSIS — I10 BENIGN ESSENTIAL HYPERTENSION: ICD-10-CM

## 2019-10-03 LAB
ANION GAP SERPL CALCULATED.3IONS-SCNC: 7 MMOL/L (ref 3–14)
BUN SERPL-MCNC: 13 MG/DL (ref 7–30)
CALCIUM SERPL-MCNC: 9.4 MG/DL (ref 8.5–10.1)
CHLORIDE SERPL-SCNC: 102 MMOL/L (ref 94–109)
CO2 SERPL-SCNC: 30 MMOL/L (ref 20–32)
CREAT SERPL-MCNC: 0.7 MG/DL (ref 0.52–1.04)
GFR SERPL CREATININE-BSD FRML MDRD: >90 ML/MIN/{1.73_M2}
GLUCOSE SERPL-MCNC: 92 MG/DL (ref 70–99)
POTASSIUM SERPL-SCNC: 3.5 MMOL/L (ref 3.4–5.3)
SODIUM SERPL-SCNC: 139 MMOL/L (ref 133–144)

## 2019-10-03 PROCEDURE — 80048 BASIC METABOLIC PNL TOTAL CA: CPT | Mod: ZL | Performed by: FAMILY MEDICINE

## 2019-10-03 PROCEDURE — 36415 COLL VENOUS BLD VENIPUNCTURE: CPT | Mod: ZL | Performed by: FAMILY MEDICINE

## 2019-10-22 DIAGNOSIS — N95.1 MENOPAUSAL SYNDROME (HOT FLASHES): ICD-10-CM

## 2019-10-30 NOTE — PROGRESS NOTES
Occupational Visit     SUBJECTIVE:  Lisa Angeles, 64 year old, female is seen for follow up of occupational injury. Date of injury is 9/14/2009.    Linked Episodes   Type: Episode: Status: Noted: Resolved: Last update: Updated by:   WORK COMP [employer] Active 9/14/2009 11/1/2019  2:00 PM Katiana Hardin LPN      Comments:back injury       Musculoskeletal problem/pain      Duration: 09/14/09    Description  Location: low back through the legs    Intensity:  4/10    Accompanying signs and symptoms: radiation of pain to legs    History  Previous similar problem: YES  Previous evaluation:  x-ray and MRI    Precipitating or alleviating factors:  Trauma or overuse: YES  Aggravating factors include: sitting, standing, walking, climbing stairs, lifting, exercise and overuse    Therapies tried and outcome: rest/inactivity, heat, ice, stretching, exercises, acetaminophen, Ibuprofen and physical therapy    Patient notes recent acute flare of symptoms, similar to previous.  In the past, she has needed a few tune up visits with PT when she has had similar flares of symptoms        No Known Allergies      Review of Systems:  Constitutional, HEENT, cardiovascular, pulmonary, gi and gu systems are negative, except as otherwise noted.      OBJECTIVE:  Vitals:    11/01/19 1400   BP: 134/70   Pulse: 78   Resp: 18   Temp: 97.8  F (36.6  C)   SpO2: 98%               Exam:  GENERAL:: healthy, alert and no distress  BACK: pain along sciatic groove and into buttock on the right  PSYCH: Alert and oriented times 3; speech- coherent , normal rate and volume; able to articulate logical thoughts, able to abstract reason, no tangential thoughts, no hallucinations or delusions, affect- normal      Labs: No results found for this or any previous visit (from the past 24 hour(s)).      ASSESSMENT/PLAN:  1. Chronic bilateral low back pain with right-sided sciatica  Will use prednisone burst to calm symptoms, pain medication refilled.  Will  order PT assessment.  Follow-up if no improvement noted.  - predniSONE (DELTASONE) 20 MG tablet; Take 1 tablet (20 mg) by mouth daily for 5 days  Dispense: 5 tablet; Refill: 0  - HYDROcodone-acetaminophen (NORCO) 7.5-325 MG per tablet; Take 1 tablet by mouth every 6 hours as needed for moderate to severe pain  Dispense: 60 tablet; Refill: 0  - PHYSICAL THERAPY REFERRAL    2. Sciatica of right side  As above.  - predniSONE (DELTASONE) 20 MG tablet; Take 1 tablet (20 mg) by mouth daily for 5 days  Dispense: 5 tablet; Refill: 0  - HYDROcodone-acetaminophen (NORCO) 7.5-325 MG per tablet; Take 1 tablet by mouth every 6 hours as needed for moderate to severe pain  Dispense: 60 tablet; Refill: 0  - PHYSICAL THERAPY REFERRAL         Carolyn Ritter MD

## 2019-11-01 ENCOUNTER — OFFICE VISIT (OUTPATIENT)
Dept: FAMILY MEDICINE | Facility: OTHER | Age: 64
End: 2019-11-01
Attending: FAMILY MEDICINE
Payer: OTHER MISCELLANEOUS

## 2019-11-01 VITALS
HEIGHT: 61 IN | RESPIRATION RATE: 18 BRPM | TEMPERATURE: 97.8 F | HEART RATE: 78 BPM | DIASTOLIC BLOOD PRESSURE: 70 MMHG | OXYGEN SATURATION: 98 % | BODY MASS INDEX: 30.02 KG/M2 | WEIGHT: 159 LBS | SYSTOLIC BLOOD PRESSURE: 134 MMHG

## 2019-11-01 DIAGNOSIS — M54.31 SCIATICA OF RIGHT SIDE: ICD-10-CM

## 2019-11-01 DIAGNOSIS — M54.41 CHRONIC BILATERAL LOW BACK PAIN WITH RIGHT-SIDED SCIATICA: Primary | ICD-10-CM

## 2019-11-01 DIAGNOSIS — G89.29 CHRONIC BILATERAL LOW BACK PAIN WITH RIGHT-SIDED SCIATICA: Primary | ICD-10-CM

## 2019-11-01 PROCEDURE — 99213 OFFICE O/P EST LOW 20 MIN: CPT | Performed by: FAMILY MEDICINE

## 2019-11-01 RX ORDER — HYDROCODONE BITARTRATE AND ACETAMINOPHEN 7.5; 325 MG/1; MG/1
1 TABLET ORAL EVERY 6 HOURS PRN
Qty: 60 TABLET | Refills: 0 | Status: SHIPPED | OUTPATIENT
Start: 2019-11-01 | End: 2020-08-12

## 2019-11-01 RX ORDER — PREDNISONE 20 MG/1
20 TABLET ORAL DAILY
Qty: 5 TABLET | Refills: 0 | Status: SHIPPED | OUTPATIENT
Start: 2019-11-01 | End: 2020-02-11

## 2019-11-01 ASSESSMENT — PAIN SCALES - GENERAL: PAINLEVEL: MODERATE PAIN (4)

## 2019-11-01 ASSESSMENT — MIFFLIN-ST. JEOR: SCORE: 1208.6

## 2019-11-01 NOTE — NURSING NOTE
"Chief Complaint   Patient presents with     Work Comp       Initial /70 (BP Location: Right arm, Patient Position: Sitting, Cuff Size: Adult Regular)   Pulse 78   Temp 97.8  F (36.6  C) (Tympanic)   Resp 18   Ht 1.549 m (5' 1\")   Wt 72.1 kg (159 lb)   SpO2 98%   BMI 30.04 kg/m   Estimated body mass index is 30.04 kg/m  as calculated from the following:    Height as of this encounter: 1.549 m (5' 1\").    Weight as of this encounter: 72.1 kg (159 lb).  Medication Reconciliation: complete  Jeni Byrne MA  "

## 2019-11-13 ENCOUNTER — TELEPHONE (OUTPATIENT)
Dept: FAMILY MEDICINE | Facility: OTHER | Age: 64
End: 2019-11-13

## 2019-11-13 NOTE — TELEPHONE ENCOUNTER
Request for service has been submitted to patient's work comp; [place] and patient has been updated.

## 2019-11-13 NOTE — TELEPHONE ENCOUNTER
1:41 PM    Reason for Call: Phone Call    Description: PT called and would like to know the  Status of the referral for physical therapy (workmans comp). The nurse told me that it was still pending. Please contact PT with information regarding next steps for PT and when to schedule.    Was an appointment offered for this call? No  If yes : Appointment type              Date    Preferred method for responding to this message: Telephone Call  What is your phone number ?  [phone] Lisa    If we cannot reach you directly, may we leave a detailed response at the number you provided? Yes    Can this message wait until your PCP/provider returns, if available today? YES, PCP is out     Hui Doshi

## 2019-12-12 NOTE — TELEPHONE ENCOUNTER
Clinic Care Coordination Contact  Care Team Conversations    CC received a call from patient.  Patient states having issues since beginning of November with issues relating to work comp and her new worker Katiana and [facility].  CC unsure of what she is looking for specifically.  CC has routed encounter to Work Rohan Rodríguez to see if she can call patient to clear this up.  Tamar Llanes, RN  [facility]

## 2019-12-17 DIAGNOSIS — N95.1 MENOPAUSAL SYNDROME (HOT FLASHES): ICD-10-CM

## 2019-12-18 NOTE — TELEPHONE ENCOUNTER
This has been approved in Nov...  Prior Authorization Approval Info:    Approved For: PHYSICAL THERAPY    Number of Visits: 12 TOTAL VISITS  (if applicable)    NAME OF AUTHORIZING PERSON : MAGDALENO RANGEL    NAME OF INSURANCE COMPANY: [employer]    DATE:11/21/19    Was sent to schedule 3 weeks ago    work comp APPROVED   Received: 3 weeks ago   Message Contents   Jazmyne Rodríguez sent to Cierra Salazar MA; Collette Keller LPN; Leslie Ward             Please schedule and attach referral to each scheduled approved PT visit.     Prior Authorization Approval Info:     Approved For: PHYSICAL THERAPY     Number of Visits: 12 TOTAL VISITS   (if applicable)     NAME OF AUTHORIZING PERSON : MAGDALENO RANGEL     NAME OF INSURANCE COMPANY: [employer]     DATE:11/21/19   Thank you,     Jazmyne Rodríguez CRCS-P   Work Comp Dept/   [address] [address]   Office: [phone] Fax [phone]

## 2019-12-19 NOTE — TELEPHONE ENCOUNTER
PREMARIN 0.3MG TABLET  Last Written Prescription Date:  9/26/2019  Last Fill Quantity: 30,   # refills: 3  Last Office Visit: 8/19/2019  Future Office visit:

## 2019-12-20 RX ORDER — CONJUGATED ESTROGENS 0.3 MG/1
TABLET, FILM COATED ORAL
Qty: 90 TABLET | Refills: 1 | Status: SHIPPED | OUTPATIENT
Start: 2019-12-20 | End: 2020-02-11

## 2020-01-03 ENCOUNTER — HOSPITAL ENCOUNTER (OUTPATIENT)
Dept: PHYSICAL THERAPY | Facility: OTHER | Age: 65
End: 2020-01-03
Attending: FAMILY MEDICINE
Payer: OTHER MISCELLANEOUS

## 2020-01-03 PROCEDURE — 97110 THERAPEUTIC EXERCISES: CPT | Mod: GP

## 2020-01-03 PROCEDURE — 97162 PT EVAL MOD COMPLEX 30 MIN: CPT | Mod: GP

## 2020-01-03 PROCEDURE — 97140 MANUAL THERAPY 1/> REGIONS: CPT | Mod: GP

## 2020-01-03 NOTE — PROGRESS NOTES
01/03/20 0800   General Information   Type of Visit Initial OP Ortho PT Evaluation   Start of Care Date 01/03/20   Referring Physician Dr. Ritter   Patient/Family Goals Statement lessen pain   Orders Evaluate and Treat   Date of Order 11/01/19   Certification Required? No   Medical Diagnosis Chronic B LBP w R sciatica   Surgical/Medical history reviewed Yes   Precautions/Limitations no known precautions/limitations   General Information Comments PMH see chart ( lumbago, sciatica, basal cell carcinoma, menopause, HBP)   Body Part(s)   Body Part(s) Lumbar Spine/SI   Presentation and Etiology   Pertinent history of current problem (include personal factors and/or comorbidities that impact the POC) Patient reports she was seen by Dr. Marx on 11/01/19 due to increased back pain.  Reports she had been having increased back pain for approximately 3 months prior to her MD visit.  She was describing course of prednisone which she states did help initially and was referred to physical therapy.  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  She has had previous physical therapy which has  help she states.  She has been continuing with her home exercises and also attends a fitness club 3 times a week.  She does have a home TENS unit.   Impairments A. Pain;B. Decreased WB tolerance;D. Decreased ROM;E. Decreased flexibility;F. Decreased strength and endurance;G. Impaired balance;H. Impaired gait;K. Numbness   Functional Limitations perform activities of daily living;perform desired leisure / sports activities;perform required work activities   Symptom Location Right low back/gluteal with pain also into the right posterior leg at times to the foot.  She reports a new symptom within the last 6 months of numbness in the plantar and dorsal surfaces of the forefoot intermittently   How/Where did it occur At work   Chronicity Chronic   Pain rating (0-10  point scale) Best (/10);Worst (/10)   Best (/10) 1   Worst (/10) 6   Pain quality A. Sharp;C. Aching;E. Shooting;F. Stabbing   Frequency of pain/symptoms A. Constant   Pain/symptoms are: The same all the time   Pain/symptoms exacerbated by B. Walking;C. Lifting;D. Carrying;G. Certain positions;H. Overhead reach;I. Bending;J. ADL;K. Home tasks;M. Other   Pain exacerbation comment Standing, traveling   Pain/symptoms eased by A. Sitting;C. Rest;E. Changing positions;K. Other   Pain eased by comment TENS unit   Progression of symptoms since onset: Worsened   Current Level of Function   Patient role/employment history G. Disabled   Fall Risk Screen   Fall screen completed by PT   Have you fallen 2 or more times in the past year? No   Have you fallen and had an injury in the past year? No   Is patient a fall risk? No   Lumbar Spine/SI Objective Findings   Observation no acute distress   Posture The left shoulder and iliac crest were elevated as compared to the right.  Slight lateral shift noted with hips to right   Flexion ROM 0-47 w pain R LB and into R leg to knee level   Extension ROM 0-22   Right Side Bending ROM 0-18w pain R LB   Left Side Bending ROM 0-28   Repeated Extension-Standing ROM increased R LBP   Pelvic Screen + L on L AST   Hip Screen Decreased hip mobility generally on right as compared to left   Transversus Abdominus Strength (Woody Leg Lowering-deg) Able to withstand moderate resistance w abs ans trunk extensors   Ankle Dorsiflexion (L4) Strength 4+/5   Great Toe Extension (L5) Strength 4+/5   Hamstring Flexibility Decreased right more so than left   Quadricep Flexibility Mild tightness bilaterally   Piriformis Flexibility Decreased on right   SLR Positive on right   Prone Instability Test Negative   Crossover SLR Negative   Slump Test negative   Segmental Mobility Posterior rotation left L3-L4 and L5 in all planes   Palpation Soft tissue tension and tenderness to palpation along the right  SI/piriformis muscle, right quadratus lumborum   Planned Therapy Interventions   Planned Therapy Interventions joint mobilization;manual therapy;ROM;strengthening;stretching   Planned Modality Interventions   Planned Modality Interventions Comments Modalities as indicated   Clinical Impression   Criteria for Skilled Therapeutic Interventions Met yes, treatment indicated   PT Diagnosis Low back pain/SI joint dysfunction   Influenced by the following impairments Pain, decreased mobility, decreased strength, numbness   Functional limitations due to impairments Walking, lifting, carrying, reaching, bending, ADLs, household tasks, standing   Clinical Presentation Evolving/Changing   Clinical Presentation Rationale Increased pain and new symptom of foot numbness   Clinical Decision Making (Complexity) Moderate complexity   Therapy Frequency 2 times/Week   Predicted Duration of Therapy Intervention (days/wks) 4 weeks   Risk & Benefits of therapy have been explained Yes   Patient, Family & other staff in agreement with plan of care Yes   Clinical Impression Comments Patient presents with low back pain with a right SI joint dysfunction and muscle imbalances as noted above   Education Assessment   Barriers to Learning No barriers   ORTHO GOALS   PT Ortho Eval Goals 1;2;3   Ortho Goal 1   Goal Identifier STG 1   Goal Description Decreased pain when at its worst to a 5/10   Target Date 01/17/20   Ortho Goal 2   Goal Identifier LTG 1   Goal Description She will demonstrate independence with home exercise program   Target Date 01/31/20   Ortho Goal 3   Goal Identifier LTG 2   Goal Description She will be able to walk 1/2 to 1 mile   Target Date 01/31/20   Total Evaluation Time   PT Rafi, Moderate Complexity Minutes (03788) 30

## 2020-01-08 ENCOUNTER — HOSPITAL ENCOUNTER (OUTPATIENT)
Dept: PHYSICAL THERAPY | Facility: OTHER | Age: 65
End: 2020-01-08
Attending: FAMILY MEDICINE
Payer: OTHER MISCELLANEOUS

## 2020-01-08 PROCEDURE — 97140 MANUAL THERAPY 1/> REGIONS: CPT | Mod: GP

## 2020-01-10 ENCOUNTER — HOSPITAL ENCOUNTER (OUTPATIENT)
Dept: PHYSICAL THERAPY | Facility: OTHER | Age: 65
End: 2020-01-10
Attending: FAMILY MEDICINE
Payer: OTHER MISCELLANEOUS

## 2020-01-10 PROCEDURE — 97140 MANUAL THERAPY 1/> REGIONS: CPT | Mod: GP

## 2020-01-10 PROCEDURE — 97035 APP MDLTY 1+ULTRASOUND EA 15: CPT | Mod: GP

## 2020-01-14 ENCOUNTER — HOSPITAL ENCOUNTER (OUTPATIENT)
Dept: PHYSICAL THERAPY | Facility: OTHER | Age: 65
End: 2020-01-14
Attending: FAMILY MEDICINE
Payer: OTHER MISCELLANEOUS

## 2020-01-14 DIAGNOSIS — N95.1 MENOPAUSAL SYNDROME (HOT FLASHES): ICD-10-CM

## 2020-01-14 PROCEDURE — 97140 MANUAL THERAPY 1/> REGIONS: CPT | Mod: GP

## 2020-01-14 PROCEDURE — 97035 APP MDLTY 1+ULTRASOUND EA 15: CPT | Mod: GP

## 2020-01-15 NOTE — TELEPHONE ENCOUNTER
PROGESTERONE CAP 100MG CAPSULE  Last Written Prescription Date:  10/24/2019  Last Fill Quantity: 90,   # refills: 0  Last Office Visit: 11/01/2019  Future Office visit:    Next 5 appointments (look out 90 days)    Feb 11, 2020 10:30 AM CST  (Arrive by 10:15 AM)  PHYSICAL with Carolyn Ritter MD  [facility] [address]  [place] [address]  [phone]

## 2020-01-16 ENCOUNTER — HOSPITAL ENCOUNTER (OUTPATIENT)
Dept: PHYSICAL THERAPY | Facility: OTHER | Age: 65
End: 2020-01-16
Attending: FAMILY MEDICINE
Payer: OTHER MISCELLANEOUS

## 2020-01-16 PROCEDURE — 97140 MANUAL THERAPY 1/> REGIONS: CPT | Mod: GP

## 2020-01-16 PROCEDURE — 97035 APP MDLTY 1+ULTRASOUND EA 15: CPT | Mod: GP

## 2020-01-21 ENCOUNTER — HOSPITAL ENCOUNTER (OUTPATIENT)
Dept: PHYSICAL THERAPY | Facility: OTHER | Age: 65
End: 2020-01-21
Attending: FAMILY MEDICINE
Payer: OTHER MISCELLANEOUS

## 2020-01-21 PROCEDURE — 97140 MANUAL THERAPY 1/> REGIONS: CPT | Mod: GP

## 2020-01-21 PROCEDURE — 97035 APP MDLTY 1+ULTRASOUND EA 15: CPT | Mod: GP

## 2020-01-23 ENCOUNTER — HOSPITAL ENCOUNTER (OUTPATIENT)
Dept: PHYSICAL THERAPY | Facility: OTHER | Age: 65
End: 2020-01-23
Attending: FAMILY MEDICINE
Payer: OTHER MISCELLANEOUS

## 2020-01-23 PROCEDURE — 97035 APP MDLTY 1+ULTRASOUND EA 15: CPT | Mod: GP

## 2020-01-23 PROCEDURE — 97140 MANUAL THERAPY 1/> REGIONS: CPT | Mod: GP

## 2020-01-23 NOTE — PROGRESS NOTES
Outpatient Physical Therapy Discharge Note     Patient: Lisa Angeles  : 1955    Beginning/End Dates of Reporting Period:  2020 to 2020    Referring Provider: Dr. Ritter    Therapy Diagnosis: Chronic B LBP w R sciatica     Client Self Report: She reports being pleased with her progress and now feels her pain is back to its baseling level.    Objective Measurements:  Objective Measure: spinal mechanics  Details: no dysfunctions found in lumbar spine or SIJ  Objective Measure: Palpation  Details: minimal remaining soft tissue tension and tenderness R gluteal/piriofrmis mms                              Outcome Measures (most recent score):      Goals:  Goal Identifier STG 1   Goal Description Decreased pain when at its worst to a 5/10   Target Date 20   Date Met  20   Progress:     Goal Identifier LTG 1   Goal Description She will demonstrate independence with home exercise program   Target Date 20   Date Met  20   Progress:     Goal Identifier LTG 2   Goal Description She will be able to walk 1/2 to 1 mile   Target Date 20   Date Met  20   Progress:     Goal Identifier     Goal Description     Target Date     Date Met      Progress:     Goal Identifier     Goal Description     Target Date     Date Met      Progress:     Goal Identifier     Goal Description     Target Date     Date Met      Progress:     Goal Identifier     Goal Description     Target Date     Date Met      Progress:     Goal Identifier     Goal Description     Target Date     Date Met      Progress:     Progress Toward Goals:   Progress this reporting period: PT goals are met          Plan:  Discharge from therapy.    Discharge:    Reason for Discharge: Patient has met all goals.    Equipment Issued: none    Discharge Plan: Patient to continue home program.
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
## 2020-02-10 NOTE — PATIENT INSTRUCTIONS
Preventive Health Recommendations    See your health care provider every year to    Review health changes.     Discuss preventive care.      Review your medicines if your doctor has prescribed any.      You no longer need a yearly Pap test unless you've had an abnormal Pap test in the past 10 years. If you have vaginal symptoms, such as bleeding or discharge, be sure to talk with your provider about a Pap test.      Every 1 to 2 years, have a mammogram.  If you are over 69, talk with your health care provider about whether or not you want to continue having screening mammograms.      Every 10 years, have a colonoscopy. Or, have a yearly FIT test (stool test). These exams will check for colon cancer.       Have a cholesterol test every 5 years, or more often if your doctor advises it.       Have a diabetes test (fasting glucose) every three years. If you are at risk for diabetes, you should have this test more often.       At age 65, have a bone density scan (DEXA) to check for osteoporosis (brittle bone disease).    Shots:    Get a flu shot each year.    Get a tetanus shot every 10 years.    Talk to your doctor about your pneumonia vaccines. There are now two you should receive - Pneumovax (PPSV 23) and Prevnar (PCV 13).    Talk to your pharmacist about the shingles vaccine.    Talk to your doctor about the hepatitis B vaccine.    Nutrition:     Eat at least 5 servings of fruits and vegetables each day.      Eat whole-grain bread, whole-wheat pasta and brown rice instead of white grains and rice.      Get adequate about Calcium and Vitamin D.     Lifestyle    Exercise at least 150 minutes a week (30 minutes a day, 5 days a week). This will help you control your weight and prevent disease.      Limit alcohol to one drink per day.      No smoking.       Wear sunscreen to prevent skin cancer.       See your dentist twice a year for an exam and cleaning.      See your eye doctor every 1 to 2 years to screen for  conditions such as glaucoma, macular degeneration, cataracts, etc.    Personalized Prevention Plan  You are due for the preventive services outlined below.  Your care team is available to assist you in scheduling these services.  If you have already completed any of these items, please share that information with your care team to update in your medical record.    Health Maintenance Due   Topic Date Due     Osteoporosis Screening  1955     Depression Action Plan  1955     HIV Screening  01/28/1970     Zoster (Shingles) Vaccine (1 of 2) 01/28/2005     Flu Vaccine (1) 09/01/2019     Pneumococcal Vaccine (1 of 2 - PCV13) 01/28/2020     Annual Wellness Visit  02/08/2020

## 2020-02-11 ENCOUNTER — OFFICE VISIT (OUTPATIENT)
Dept: FAMILY MEDICINE | Facility: OTHER | Age: 65
End: 2020-02-11
Attending: FAMILY MEDICINE
Payer: COMMERCIAL

## 2020-02-11 VITALS
WEIGHT: 155 LBS | TEMPERATURE: 97.7 F | BODY MASS INDEX: 29.27 KG/M2 | DIASTOLIC BLOOD PRESSURE: 62 MMHG | SYSTOLIC BLOOD PRESSURE: 110 MMHG | HEIGHT: 61 IN | OXYGEN SATURATION: 98 % | HEART RATE: 69 BPM

## 2020-02-11 DIAGNOSIS — N95.1 MENOPAUSAL SYNDROME (HOT FLASHES): ICD-10-CM

## 2020-02-11 DIAGNOSIS — Z00.00 ROUTINE GENERAL MEDICAL EXAMINATION AT A HEALTH CARE FACILITY: Primary | ICD-10-CM

## 2020-02-11 DIAGNOSIS — I10 BENIGN ESSENTIAL HYPERTENSION: ICD-10-CM

## 2020-02-11 DIAGNOSIS — R22.31 MASS OF RIGHT HAND: ICD-10-CM

## 2020-02-11 DIAGNOSIS — R20.0 NUMBNESS OF RIGHT FOOT: ICD-10-CM

## 2020-02-11 DIAGNOSIS — Z01.419 ENCOUNTER FOR GYNECOLOGICAL EXAMINATION WITHOUT ABNORMAL FINDING: ICD-10-CM

## 2020-02-11 DIAGNOSIS — E78.5 HYPERLIPIDEMIA, UNSPECIFIED HYPERLIPIDEMIA TYPE: ICD-10-CM

## 2020-02-11 DIAGNOSIS — Z23 NEED FOR VACCINATION: ICD-10-CM

## 2020-02-11 LAB
ANION GAP SERPL CALCULATED.3IONS-SCNC: 6 MMOL/L (ref 3–14)
BUN SERPL-MCNC: 10 MG/DL (ref 7–30)
CALCIUM SERPL-MCNC: 9.7 MG/DL (ref 8.5–10.1)
CHLORIDE SERPL-SCNC: 101 MMOL/L (ref 94–109)
CHOLEST SERPL-MCNC: 261 MG/DL
CO2 SERPL-SCNC: 32 MMOL/L (ref 20–32)
CREAT SERPL-MCNC: 0.62 MG/DL (ref 0.52–1.04)
GFR SERPL CREATININE-BSD FRML MDRD: >90 ML/MIN/{1.73_M2}
GLUCOSE SERPL-MCNC: 96 MG/DL (ref 70–99)
HDLC SERPL-MCNC: 72 MG/DL
LDLC SERPL CALC-MCNC: 155 MG/DL
NONHDLC SERPL-MCNC: 189 MG/DL
POTASSIUM SERPL-SCNC: 3 MMOL/L (ref 3.4–5.3)
SODIUM SERPL-SCNC: 139 MMOL/L (ref 133–144)
TRIGL SERPL-MCNC: 170 MG/DL

## 2020-02-11 PROCEDURE — 80061 LIPID PANEL: CPT | Mod: ZL | Performed by: FAMILY MEDICINE

## 2020-02-11 PROCEDURE — G0476 HPV COMBO ASSAY CA SCREEN: HCPCS | Mod: ZL | Performed by: FAMILY MEDICINE

## 2020-02-11 PROCEDURE — 99397 PER PM REEVAL EST PAT 65+ YR: CPT | Performed by: FAMILY MEDICINE

## 2020-02-11 PROCEDURE — G0008 ADMIN INFLUENZA VIRUS VAC: HCPCS

## 2020-02-11 PROCEDURE — G0463 HOSPITAL OUTPT CLINIC VISIT: HCPCS

## 2020-02-11 PROCEDURE — 36415 COLL VENOUS BLD VENIPUNCTURE: CPT | Mod: ZL | Performed by: FAMILY MEDICINE

## 2020-02-11 PROCEDURE — 90662 IIV NO PRSV INCREASED AG IM: CPT

## 2020-02-11 PROCEDURE — 80048 BASIC METABOLIC PNL TOTAL CA: CPT | Mod: ZL | Performed by: FAMILY MEDICINE

## 2020-02-11 PROCEDURE — G0123 SCREEN CERV/VAG THIN LAYER: HCPCS | Mod: ZL | Performed by: FAMILY MEDICINE

## 2020-02-11 PROCEDURE — 87624 HPV HI-RISK TYP POOLED RSLT: CPT | Mod: ZL | Performed by: FAMILY MEDICINE

## 2020-02-11 RX ORDER — B-COMPLEX WITH VITAMIN C
1 CAPSULE ORAL DAILY
Qty: 90 CAPSULE | Refills: 3 | Status: SHIPPED | OUTPATIENT
Start: 2020-02-11

## 2020-02-11 RX ORDER — VITAMIN B COMPLEX
25 TABLET ORAL DAILY
Qty: 90 TABLET | Refills: 3 | Status: SHIPPED | OUTPATIENT
Start: 2020-02-11 | End: 2024-02-06

## 2020-02-11 RX ORDER — HYDROCHLOROTHIAZIDE 25 MG/1
25 TABLET ORAL DAILY
Qty: 90 TABLET | Refills: 3 | Status: SHIPPED | OUTPATIENT
Start: 2020-02-11 | End: 2021-03-02

## 2020-02-11 RX ORDER — CHLORAL HYDRATE 500 MG
3 CAPSULE ORAL DAILY
Qty: 270 CAPSULE | Refills: 3 | Status: SHIPPED | OUTPATIENT
Start: 2020-02-11

## 2020-02-11 RX ORDER — CALCIUM CARBONATE 500 MG/1
1 TABLET, CHEWABLE ORAL 2 TIMES DAILY
Qty: 180 TABLET | Refills: 3 | Status: SHIPPED | OUTPATIENT
Start: 2020-02-11

## 2020-02-11 ASSESSMENT — MIFFLIN-ST. JEOR: SCORE: 1185.46

## 2020-02-11 ASSESSMENT — PAIN SCALES - GENERAL: PAINLEVEL: NO PAIN (1)

## 2020-02-11 NOTE — LETTER
My Depression Action Plan  Name: Lisa Angeles   Date of Birth 1955  Date: 2/10/2020    My doctor: Carolyn Ritter   My clinic: [facility]  [address]  [place] [address]  [phone]          GREEN    ZONE   Good Control    What it looks like:     Things are going generally well. You have normal ups and downs. You may even feel depressed from time to time, but bad moods usually last less than a day.   What you need to do:  1. Continue to care for yourself (see self care plan)  2. Check your depression survival kit and update it as needed  3. Follow your physician s recommendations including any medication.  4. Do not stop taking medication unless you consult with your physician first.           YELLOW         ZONE Getting Worse    What it looks like:     Depression is starting to interfere with your life.     It may be hard to get out of bed; you may be starting to isolate yourself from others.    Symptoms of depression are starting to last most all day and this has happened for several days.     You may have suicidal thoughts but they are not constant.   What you need to do:     1. Call your care team. Your response to treatment will improve if you keep your care team informed of your progress. Yellow periods are signs an adjustment may need to be made.     2. Continue your self-care.  Just get dressed and ready for the day.  Don't give yourself time to talk yourself out of it.    3. Talk to someone in your support network.    4. Open up your Depression Self-Care Plan/Wellness Kit.           RED    ZONE Medical Alert - Get Help    What it looks like:     Depression is seriously interfering with your life.     You may experience these or other symptoms: You can t get out of bed most days, can t work or engage in other necessary activities, you have trouble taking care of basic hygiene, or basic responsibilities, thoughts of suicide or death that  will not go away, self-injurious behavior.     What you need to do:  1. Call your care team and request a same-day appointment. If they are not available (weekends or after hours) call your local crisis line, emergency room or 911.            Depression Self-Care Plan / Wellness Kit    Self-Care for Depression  Here s the deal. Your body and mind are really not as separate as most people think.  What you do and think affects how you feel and how you feel influences what you do and think. This means if you do things that people who feel good do, it will help you feel better.  Sometimes this is all it takes.  There is also a place for medication and therapy depending on how severe your depression is, so be sure to consult with your medical provider and/ or Behavioral Health Consultant if your symptoms are worsening or not improving.     In order to better manage my stress, I will:    Exercise  Get some form of exercise, every day. This will help reduce pain and release endorphins, the  feel good  chemicals in your brain. This is almost as good as taking antidepressants!  This is not the same as joining a gym and then never going! (they count on that by the way ) It can be as simple as just going for a walk or doing some gardening, anything that will get you moving.      Hygiene   Maintain good hygiene (get out of bed in the morning, make your bed, brush your teeth, take a shower, and get dressed like you were going to work, even if you are unemployed).  If your clothes don't fit try to get ones that do.    Diet  Strive to eat foods that are good for me, drink plenty of water, and avoid excessive sugar, caffeine, alcohol, and other mood-altering substances.  Some foods that are helpful in depression are: complex carbohydrates, B vitamins, flaxseed, fish or fish oil, fresh fruits and vegetables.    Psychotherapy  Agree to participate in Individual Therapy (if recommended).    Medication  If prescribed medications, I  agree to take them.  Missing doses can result in serious side effects.  I understand that drinking alcohol, or other illicit drug use, may cause potential side effects.  I will not stop my medication abruptly without first discussing it with my provider.    Staying Connected With Others  Stay in touch with my friends, family members, and my primary care provider/team.    Use your imagination  Be creative.  We all have a creative side; it doesn t matter if it s oil painting, sand castles, or mud pies! This will also kick up the endorphins.    Witness Beauty  (AKA stop and smell the roses) Take a look outside, even in mid-winter. Notice colors, textures. Watch the squirrels and birds.     Service to others  Be of service to others.  There is always someone else in need.  By helping others we can  get out of ourselves  and remember the really important things.  This also provides opportunities for practicing all the other parts of the program.    Humor  Laugh and be silly!  Adjust your TV habits for less news and crime-drama and more comedy.    Control your stress  Try breathing deep, massage therapy, biofeedback, and meditation. Find time to relax each day.     Crisis Text Line  http://www.crisistextline.org    The Crisis Text Line serves anyone, in any type of crisis, providing access to free, 24/7 support and information via the medium people already use and trust:    Here's how it works:  1.  Text 306-304 from anywhere in the [place], anytime, about any type of crisis.  2.  A live, trained Crisis Counselor receives the text and responds quickly.  3.  The volunteer Crisis Counselor will help you move from a 'hot moment to a cool moment'.    My support system    Clinic Contact:  Phone number:    Contact 1:  Phone number:    Contact 2:  Phone number:    Jehovah's witness/:  Phone number:    Therapist:  Phone number:    Local crisis center:    Phone number:    Other community support:  Phone number:

## 2020-02-11 NOTE — NURSING NOTE
"Chief Complaint   Patient presents with     Physical       Initial /62 (BP Location: Right arm, Patient Position: Chair, Cuff Size: Adult Regular)   Pulse 69   Temp 97.7  F (36.5  C) (Tympanic)   Ht 1.549 m (5' 1\")   Wt 70.3 kg (155 lb)   SpO2 98%   BMI 29.29 kg/m   Estimated body mass index is 29.29 kg/m  as calculated from the following:    Height as of this encounter: 1.549 m (5' 1\").    Weight as of this encounter: 70.3 kg (155 lb).  Medication Reconciliation: complete     Ele Alvarez LPN    "

## 2020-02-18 ENCOUNTER — ANCILLARY PROCEDURE (OUTPATIENT)
Dept: MAMMOGRAPHY | Facility: OTHER | Age: 65
End: 2020-02-18
Attending: FAMILY MEDICINE
Payer: MEDICARE

## 2020-02-18 DIAGNOSIS — Z12.31 VISIT FOR SCREENING MAMMOGRAM: ICD-10-CM

## 2020-02-18 LAB
COPATH REPORT: NORMAL
PAP: NORMAL

## 2020-02-18 PROCEDURE — 77067 SCR MAMMO BI INCL CAD: CPT | Mod: TC

## 2020-02-26 ENCOUNTER — TRANSFERRED RECORDS (OUTPATIENT)
Dept: HEALTH INFORMATION MANAGEMENT | Facility: CLINIC | Age: 65
End: 2020-02-26

## 2020-02-27 ENCOUNTER — TRANSFERRED RECORDS (OUTPATIENT)
Dept: HEALTH INFORMATION MANAGEMENT | Facility: CLINIC | Age: 65
End: 2020-02-27

## 2020-03-02 DIAGNOSIS — E78.5 HYPERLIPIDEMIA, UNSPECIFIED HYPERLIPIDEMIA TYPE: ICD-10-CM

## 2020-03-02 DIAGNOSIS — I10 BENIGN ESSENTIAL HYPERTENSION: Primary | ICD-10-CM

## 2020-03-02 LAB
ANION GAP SERPL CALCULATED.3IONS-SCNC: 6 MMOL/L (ref 3–14)
BUN SERPL-MCNC: 19 MG/DL (ref 7–30)
CALCIUM SERPL-MCNC: 9.5 MG/DL (ref 8.5–10.1)
CHLORIDE SERPL-SCNC: 101 MMOL/L (ref 94–109)
CHOLEST SERPL-MCNC: 241 MG/DL
CO2 SERPL-SCNC: 31 MMOL/L (ref 20–32)
CREAT SERPL-MCNC: 0.69 MG/DL (ref 0.52–1.04)
GFR SERPL CREATININE-BSD FRML MDRD: >90 ML/MIN/{1.73_M2}
GLUCOSE SERPL-MCNC: 95 MG/DL (ref 70–99)
HDLC SERPL-MCNC: 74 MG/DL
LDLC SERPL CALC-MCNC: 140 MG/DL
NONHDLC SERPL-MCNC: 167 MG/DL
POTASSIUM SERPL-SCNC: 3.3 MMOL/L (ref 3.4–5.3)
SODIUM SERPL-SCNC: 138 MMOL/L (ref 133–144)
TRIGL SERPL-MCNC: 134 MG/DL

## 2020-03-02 PROCEDURE — 80048 BASIC METABOLIC PNL TOTAL CA: CPT | Mod: ZL | Performed by: FAMILY MEDICINE

## 2020-03-02 PROCEDURE — 36415 COLL VENOUS BLD VENIPUNCTURE: CPT | Mod: ZL | Performed by: FAMILY MEDICINE

## 2020-03-02 PROCEDURE — 80061 LIPID PANEL: CPT | Mod: ZL | Performed by: FAMILY MEDICINE

## 2020-04-02 ENCOUNTER — TELEPHONE (OUTPATIENT)
Dept: FAMILY MEDICINE | Facility: OTHER | Age: 65
End: 2020-04-02

## 2020-04-02 DIAGNOSIS — G89.29 CHRONIC BILATERAL LOW BACK PAIN WITH RIGHT-SIDED SCIATICA: ICD-10-CM

## 2020-04-02 DIAGNOSIS — M54.30 SCIATICA, UNSPECIFIED LATERALITY: ICD-10-CM

## 2020-04-02 DIAGNOSIS — M54.41 CHRONIC BILATERAL LOW BACK PAIN WITH RIGHT-SIDED SCIATICA: ICD-10-CM

## 2020-04-02 RX ORDER — DIAZEPAM 5 MG
5-10 TABLET ORAL EVERY 6 HOURS PRN
Qty: 40 TABLET | Refills: 1 | Status: SHIPPED | OUTPATIENT
Start: 2020-04-02 | End: 2020-04-13

## 2020-04-02 NOTE — TELEPHONE ENCOUNTER
[facility] called stating they cant fill the diazepam because its a controlled substance. Can this be sent somewhere else?

## 2020-04-02 NOTE — TELEPHONE ENCOUNTER
Pt called requesting refill on diazepam. It is through work comp.     Diazepam    Last Written Prescription Date:  2/5/19  Last Fill Quantity: 40,   # refills: 0  Last Office Visit: 2/11/20  Future Office visit:    Next 5 appointments (look out 90 days)    Apr 21, 2020 10:00 AM CDT  (Arrive by 9:45 AM)  Return Visit with CHASITY Bell MD  [facility] ([facility] ) [address] [place] [address] [phone]           Routing refill request to provider for review/approval because:  Medication is reported/historical

## 2020-04-10 NOTE — TELEPHONE ENCOUNTER
Patient calling again regarding the medication refill. Wanting to know if there is paper script to  or what is going on with the script. Please call patient back asap regarding this. Ashley A. Lechevalier, LPN on 4/10/2020 at 9:59 AM

## 2020-04-13 DIAGNOSIS — M54.41 CHRONIC BILATERAL LOW BACK PAIN WITH RIGHT-SIDED SCIATICA: ICD-10-CM

## 2020-04-13 DIAGNOSIS — G89.29 CHRONIC BILATERAL LOW BACK PAIN WITH RIGHT-SIDED SCIATICA: ICD-10-CM

## 2020-04-13 DIAGNOSIS — M54.30 SCIATICA, UNSPECIFIED LATERALITY: ICD-10-CM

## 2020-04-13 RX ORDER — DIAZEPAM 5 MG
5-10 TABLET ORAL EVERY 6 HOURS PRN
Qty: 40 TABLET | Refills: 1 | Status: SHIPPED | OUTPATIENT
Start: 2020-04-13 | End: 2021-03-09

## 2020-04-13 NOTE — TELEPHONE ENCOUNTER
Patient reporting the previous refill for diazepam was sent to the [facility] and they do not fill Controlled Substances so patient is in need of this refill going to [facility]. Pharmacy has been attached to this order.     diazepam (VALIUM) 5 MG tablet         Last Written Prescription Date:  4/2/20 (was denied by [facility])  Last Fill Quantity: 40,   # refills: 1  Last Office Visit: 2/11/20  Future Office visit:    Next 5 appointments (look out 90 days)    Apr 21, 2020 10:00 AM CDT  (Arrive by 9:45 AM)  Return Visit with CHASITY Bell MD  [facility] ([facility] ) [address] [place] [address] [phone]           Routing refill request to provider for review/approval because:  Drug not on the [facility], [facility] or [facility] refill protocol or controlled substance

## 2020-05-12 ENCOUNTER — TELEPHONE (OUTPATIENT)
Dept: FAMILY MEDICINE | Facility: OTHER | Age: 65
End: 2020-05-12

## 2020-05-12 NOTE — TELEPHONE ENCOUNTER
PT states 3 mos ago, she and Dr. Ritter were trying to wean pt off of Progesterone and Primiline (sp?). She says the days she skips doses, hot flashes occur, but are manageable. She needs advise on dosage from her forward from Provider.

## 2020-05-12 NOTE — TELEPHONE ENCOUNTER
I would recommend staying at current dose (skipping days) until hot flashes are gone or almost gone.  Then we can decrease more.

## 2020-06-16 ENCOUNTER — OFFICE VISIT (OUTPATIENT)
Dept: DERMATOLOGY | Facility: OTHER | Age: 65
End: 2020-06-16
Attending: DERMATOLOGY
Payer: MEDICARE

## 2020-06-16 VITALS
TEMPERATURE: 98.1 F | SYSTOLIC BLOOD PRESSURE: 102 MMHG | DIASTOLIC BLOOD PRESSURE: 60 MMHG | BODY MASS INDEX: 29.85 KG/M2 | HEART RATE: 68 BPM | WEIGHT: 158 LBS | OXYGEN SATURATION: 98 %

## 2020-06-16 DIAGNOSIS — L30.9 DERMATITIS: Primary | ICD-10-CM

## 2020-06-16 PROCEDURE — 17110 DESTRUCTION B9 LES UP TO 14: CPT | Performed by: DERMATOLOGY

## 2020-06-16 PROCEDURE — 17110 DESTRUCTION B9 LES UP TO 14: CPT

## 2020-06-16 PROCEDURE — G0463 HOSPITAL OUTPT CLINIC VISIT: HCPCS | Mod: 25

## 2020-06-16 RX ORDER — TRIAMCINOLONE ACETONIDE 1 MG/G
CREAM TOPICAL
Qty: 80 G | Refills: 3 | Status: SHIPPED | OUTPATIENT
Start: 2020-06-16 | End: 2023-03-04

## 2020-06-16 ASSESSMENT — PAIN SCALES - GENERAL: PAINLEVEL: NO PAIN (0)

## 2020-06-16 NOTE — LETTER
6/16/2020       RE: Lisa Angeles  [address] [place] [address]     Dear Colleague,    Thank you for referring your patient, Lisa Angeles, to the [facility]. Please see a copy of my visit note below.    Dictated     Again, thank you for allowing me to participate in the care of your patient.      Sincerely,    CHASITY Bell MD

## 2020-06-16 NOTE — CONSULTS
Consult Date:  2020      SUBJECTIVE:  Charlene comes in for an annual skin check.  She has some concerns about a lesion on her right calf.  Also is having some itching on both arms and would like a general skin check.      OBJECTIVE:  A healthy lady in no distress.  The scar on her glabella from the previous basal cell is still present and visible, but there is no evidence of recurrence of tumor.  We checked her face, her neck, her back, her upper chest, arms, and legs today.  She does show some eczema on the arms.  She shows a lesion on her right upper lateral calf that appears to be either a wart or an irritated seborrheic keratosis.  I treated it with liquid nitrogen.  We found no worrisome lesions in the high-risk head and neck regions and nothing that would suggest dysplasia of any nevi.       ASSESSMENT:  Very few nevi, very few seborrheic keratoses.  Face is somewhat reddened from recent sun.  Suspect that the arm and leg lesions are due to simple eczema.      PLAN:  Triamcinolone 0.1 cream at bedtime for the eczema.  Cryotherapy to the lesion on the right calf as mentioned.  Reassurance that we found nothing serious.  Return again in 1 year.      MEDICATIONS AND ALLERGIES:  Reviewed.         CHASITY SMITH MD             D: 2020   T: 2020   MT: [place]      Name:     BRIGETTE WEI   MRN:      [id number]        Account:       [id number]   :      1955           Consult Date:  2020      Document: [id number]

## 2020-06-16 NOTE — NURSING NOTE
"Chief Complaint   Patient presents with     Skin Check       Initial /60   Pulse 68   Temp 98.1  F (36.7  C)   Wt 71.7 kg (158 lb)   SpO2 98%   BMI 29.85 kg/m   Estimated body mass index is 29.85 kg/m  as calculated from the following:    Height as of 2/11/20: 1.549 m (5' 1\").    Weight as of this encounter: 71.7 kg (158 lb).  Medication Reconciliation: complete  Jessa Behrman, LPN  "

## 2020-08-12 DIAGNOSIS — M54.41 CHRONIC BILATERAL LOW BACK PAIN WITH RIGHT-SIDED SCIATICA: ICD-10-CM

## 2020-08-12 DIAGNOSIS — M54.30 SCIATICA, UNSPECIFIED LATERALITY: ICD-10-CM

## 2020-08-12 DIAGNOSIS — M54.31 SCIATICA OF RIGHT SIDE: ICD-10-CM

## 2020-08-12 DIAGNOSIS — G89.29 CHRONIC BILATERAL LOW BACK PAIN WITH RIGHT-SIDED SCIATICA: ICD-10-CM

## 2020-08-12 RX ORDER — HYDROCODONE BITARTRATE AND ACETAMINOPHEN 7.5; 325 MG/1; MG/1
1 TABLET ORAL EVERY 6 HOURS PRN
Qty: 60 TABLET | Refills: 0 | Status: SHIPPED | OUTPATIENT
Start: 2020-08-12 | End: 2020-10-20

## 2020-08-12 RX ORDER — IBUPROFEN 800 MG/1
800 TABLET, FILM COATED ORAL EVERY 8 HOURS PRN
Qty: 90 TABLET | Refills: 0 | Status: SHIPPED | OUTPATIENT
Start: 2020-08-12 | End: 2021-08-27

## 2020-08-12 NOTE — TELEPHONE ENCOUNTER
HYDROcodone-acetaminophen (NORCO) 7.5-325 MG per tablet       Last Written Prescription Date:  11/1/19  Last Fill Quantity: 60,   # refills: 0    ibuprofen (ADVIL/MOTRIN) 800 MG tablet       Last Written Prescription Date:  2/5/19  Last Fill Quantity: 90,   # refills: 0    Last Office Visit: 2/11/20  Future Office visit:       Routing refill request to provider for review/approval because:  Drug not on the [facility], P or [facility] refill protocol or controlled substance

## 2020-10-19 DIAGNOSIS — G89.29 CHRONIC BILATERAL LOW BACK PAIN WITH RIGHT-SIDED SCIATICA: ICD-10-CM

## 2020-10-19 DIAGNOSIS — M54.41 CHRONIC BILATERAL LOW BACK PAIN WITH RIGHT-SIDED SCIATICA: ICD-10-CM

## 2020-10-19 DIAGNOSIS — M54.31 SCIATICA OF RIGHT SIDE: ICD-10-CM

## 2020-10-19 NOTE — TELEPHONE ENCOUNTER
Norco       Last Written Prescription Date:  8/12/2020  Last Fill Quantity: 60,   # refills: 0  Last Office Visit: 2/11/2020  Future Office visit:

## 2020-10-20 RX ORDER — HYDROCODONE BITARTRATE AND ACETAMINOPHEN 7.5; 325 MG/1; MG/1
1 TABLET ORAL EVERY 6 HOURS PRN
Qty: 60 TABLET | Refills: 0 | Status: SHIPPED | OUTPATIENT
Start: 2020-10-20 | End: 2021-10-07

## 2020-11-30 DIAGNOSIS — N95.1 MENOPAUSAL SYNDROME (HOT FLASHES): ICD-10-CM

## 2020-12-01 RX ORDER — CONJUGATED ESTROGENS 0.3 MG/1
TABLET, FILM COATED ORAL
Qty: 90 TABLET | Refills: 0 | Status: SHIPPED | OUTPATIENT
Start: 2020-12-01 | End: 2021-03-09

## 2020-12-20 ENCOUNTER — HEALTH MAINTENANCE LETTER (OUTPATIENT)
Age: 65
End: 2020-12-20

## 2021-03-03 ENCOUNTER — ANCILLARY PROCEDURE (OUTPATIENT)
Dept: MAMMOGRAPHY | Facility: OTHER | Age: 66
End: 2021-03-03
Attending: FAMILY MEDICINE
Payer: MEDICARE

## 2021-03-03 DIAGNOSIS — Z12.31 VISIT FOR SCREENING MAMMOGRAM: ICD-10-CM

## 2021-03-03 PROCEDURE — 77063 BREAST TOMOSYNTHESIS BI: CPT | Mod: TC

## 2021-03-08 NOTE — PROGRESS NOTES
"Occupational Visit     SUBJECTIVE:  Lisa Angeles, 66 year old, female is seen for follow up of occupational injury. Date of injury is 9/14/2009.    Linked Episodes   Type: Episode: Status: Noted: Resolved: Last update: Updated by:   WORK COMP [facility] Active 9/14/2009  3/9/2021 10:11 AM Katiana Hardin LPN      Comments:back injury       Back Pain       Duration: 9/14/2009        Specific cause: lifting, turning/bending    Description:   Location of pain: low back right and hip right  Character of pain: dull ache and stabbing  Pain radiation:radiates into the right buttocks and radiates into the right leg  New numbness or weakness in legs, not attributed to pain:  YES    Intensity: Currently 3/10    History:   Pain interferes with job: YES  History of back problems: no prior back problems  Any previous MRI or X-rays: Yes- at [facility].   Sees a specialist for back pain:  No  Therapies tried without relief: cold, heat, muscle relaxants, Physical Therapy and stretch    Alleviating factors:   Improved by: nothing      Precipitating factors:  Worsened by: Lifting and Bending          Accompanying Signs & Symptoms:   Symptoms have been flaring recently - gyms were shut down due to COVID, so she wasn't able to do her exercises as efficiently.  She is noting worsening symptoms, and I think a \"tune up\" with physical therapy would help immensely, as well as getting back to the gym and her regular routine.      No Known Allergies      Review of Systems:  Constitutional, HEENT, cardiovascular, pulmonary, gi and gu systems are negative, except as otherwise noted.      OBJECTIVE:  Vitals:    03/09/21 1018   BP: 130/70   Pulse: 69   Resp: 18   Temp: 97.2  F (36.2  C)   SpO2: 99%               Exam:  GENERAL:: healthy, alert and no distress  BACK: no CVA tenderness, no paralumbar tenderness  PSYCH: Alert and oriented times 3; speech- coherent , normal rate and volume; able to articulate logical thoughts      Labs: No results " found for this or any previous visit (from the past 24 hour(s)).      ASSESSMENT/PLAN:  1. Chronic bilateral low back pain with right-sided sciatica  Referral ordered and medication refilled.  Follow-up on annual basis, as symptoms stable.  I would recommend returning to the gym as well.  - PHYSICAL THERAPY REFERRAL; Future  - diazepam (VALIUM) 5 MG tablet; Take 1-2 tablets (5-10 mg) by mouth every 6 hours as needed for muscle spasms (MUSCLE SPASM)  Dispense: 40 tablet; Refill: 1    2. Sciatica of right side  As above.  - PHYSICAL THERAPY REFERRAL; Future  - diazepam (VALIUM) 5 MG tablet; Take 1-2 tablets (5-10 mg) by mouth every 6 hours as needed for muscle spasms (MUSCLE SPASM)  Dispense: 40 tablet; Refill: 1        Carolyn Ritter MD

## 2021-03-09 ENCOUNTER — OFFICE VISIT (OUTPATIENT)
Dept: FAMILY MEDICINE | Facility: OTHER | Age: 66
End: 2021-03-09
Attending: FAMILY MEDICINE
Payer: COMMERCIAL

## 2021-03-09 ENCOUNTER — OFFICE VISIT (OUTPATIENT)
Dept: FAMILY MEDICINE | Facility: OTHER | Age: 66
End: 2021-03-09
Attending: FAMILY MEDICINE
Payer: OTHER MISCELLANEOUS

## 2021-03-09 VITALS
OXYGEN SATURATION: 99 % | HEART RATE: 69 BPM | RESPIRATION RATE: 18 BRPM | TEMPERATURE: 97.2 F | BODY MASS INDEX: 28.52 KG/M2 | DIASTOLIC BLOOD PRESSURE: 70 MMHG | HEIGHT: 62 IN | SYSTOLIC BLOOD PRESSURE: 130 MMHG | WEIGHT: 155 LBS

## 2021-03-09 VITALS
DIASTOLIC BLOOD PRESSURE: 70 MMHG | SYSTOLIC BLOOD PRESSURE: 130 MMHG | BODY MASS INDEX: 28.52 KG/M2 | WEIGHT: 155 LBS | RESPIRATION RATE: 18 BRPM | TEMPERATURE: 97.2 F | HEIGHT: 62 IN | HEART RATE: 69 BPM

## 2021-03-09 DIAGNOSIS — G89.29 CHRONIC BILATERAL LOW BACK PAIN WITH RIGHT-SIDED SCIATICA: Primary | ICD-10-CM

## 2021-03-09 DIAGNOSIS — N95.1 MENOPAUSAL SYNDROME (HOT FLASHES): ICD-10-CM

## 2021-03-09 DIAGNOSIS — M18.11 ARTHRITIS OF CARPOMETACARPAL (CMC) JOINT OF RIGHT THUMB: ICD-10-CM

## 2021-03-09 DIAGNOSIS — I10 BENIGN ESSENTIAL HYPERTENSION: ICD-10-CM

## 2021-03-09 DIAGNOSIS — M54.31 SCIATICA OF RIGHT SIDE: ICD-10-CM

## 2021-03-09 DIAGNOSIS — E78.5 HYPERLIPIDEMIA, UNSPECIFIED HYPERLIPIDEMIA TYPE: ICD-10-CM

## 2021-03-09 DIAGNOSIS — Z00.00 ROUTINE GENERAL MEDICAL EXAMINATION AT A HEALTH CARE FACILITY: Primary | ICD-10-CM

## 2021-03-09 DIAGNOSIS — M54.41 CHRONIC BILATERAL LOW BACK PAIN WITH RIGHT-SIDED SCIATICA: Primary | ICD-10-CM

## 2021-03-09 LAB
ANION GAP SERPL CALCULATED.3IONS-SCNC: 6 MMOL/L (ref 3–14)
BUN SERPL-MCNC: 15 MG/DL (ref 7–30)
CALCIUM SERPL-MCNC: 9.8 MG/DL (ref 8.5–10.1)
CHLORIDE SERPL-SCNC: 102 MMOL/L (ref 94–109)
CHOLEST SERPL-MCNC: 260 MG/DL
CO2 SERPL-SCNC: 31 MMOL/L (ref 20–32)
CREAT SERPL-MCNC: 0.69 MG/DL (ref 0.52–1.04)
GFR SERPL CREATININE-BSD FRML MDRD: >90 ML/MIN/{1.73_M2}
GLUCOSE SERPL-MCNC: 99 MG/DL (ref 70–99)
HDLC SERPL-MCNC: 80 MG/DL
LDLC SERPL CALC-MCNC: 153 MG/DL
NONHDLC SERPL-MCNC: 180 MG/DL
POTASSIUM SERPL-SCNC: 4 MMOL/L (ref 3.4–5.3)
SODIUM SERPL-SCNC: 139 MMOL/L (ref 133–144)
TRIGL SERPL-MCNC: 134 MG/DL

## 2021-03-09 PROCEDURE — 80048 BASIC METABOLIC PNL TOTAL CA: CPT | Mod: ZL | Performed by: FAMILY MEDICINE

## 2021-03-09 PROCEDURE — 99214 OFFICE O/P EST MOD 30 MIN: CPT | Performed by: FAMILY MEDICINE

## 2021-03-09 PROCEDURE — 36415 COLL VENOUS BLD VENIPUNCTURE: CPT | Mod: ZL | Performed by: FAMILY MEDICINE

## 2021-03-09 PROCEDURE — 99397 PER PM REEVAL EST PAT 65+ YR: CPT | Performed by: FAMILY MEDICINE

## 2021-03-09 PROCEDURE — 80061 LIPID PANEL: CPT | Mod: ZL | Performed by: FAMILY MEDICINE

## 2021-03-09 RX ORDER — DIAZEPAM 5 MG
5-10 TABLET ORAL EVERY 6 HOURS PRN
Qty: 40 TABLET | Refills: 1 | Status: SHIPPED | OUTPATIENT
Start: 2021-03-09 | End: 2022-04-01

## 2021-03-09 ASSESSMENT — PAIN SCALES - GENERAL
PAINLEVEL: MILD PAIN (3)
PAINLEVEL: MILD PAIN (3)

## 2021-03-09 ASSESSMENT — MIFFLIN-ST. JEOR
SCORE: 1196.33
SCORE: 1196.33

## 2021-03-09 NOTE — NURSING NOTE
"Chief Complaint   Patient presents with     Work Comp       Initial /70 (BP Location: Right arm, Patient Position: Chair, Cuff Size: Adult Regular)   Pulse 69   Temp 97.2  F (36.2  C) (Tympanic)   Resp 18   Ht 1.575 m (5' 2\")   Wt 70.3 kg (155 lb)   SpO2 99%   BMI 28.35 kg/m   Estimated body mass index is 28.35 kg/m  as calculated from the following:    Height as of this encounter: 1.575 m (5' 2\").    Weight as of this encounter: 70.3 kg (155 lb).  Medication Reconciliation: complete  Ele Alvarez LPN    "

## 2021-03-09 NOTE — NURSING NOTE
"Chief Complaint   Patient presents with     Physical       Initial /70 (BP Location: Right arm, Patient Position: Chair, Cuff Size: Adult Regular)   Pulse 69   Temp 97.2  F (36.2  C) (Tympanic)   Resp 18   Ht 1.575 m (5' 2\")   Wt 70.3 kg (155 lb)   BMI 28.35 kg/m   Estimated body mass index is 28.35 kg/m  as calculated from the following:    Height as of this encounter: 1.575 m (5' 2\").    Weight as of this encounter: 70.3 kg (155 lb).  Medication Reconciliation: complete  Ele Alvarez LPN    "

## 2021-03-09 NOTE — PATIENT INSTRUCTIONS
Preventive Health Recommendations    See your health care provider every year to    Review health changes.     Discuss preventive care.      Review your medicines if your doctor has prescribed any.      You no longer need a yearly Pap test unless you've had an abnormal Pap test in the past 10 years. If you have vaginal symptoms, such as bleeding or discharge, be sure to talk with your provider about a Pap test.      Every 1 to 2 years, have a mammogram.  If you are over 69, talk with your health care provider about whether or not you want to continue having screening mammograms.      Every 10 years, have a colonoscopy. Or, have a yearly FIT test (stool test). These exams will check for colon cancer.       Have a cholesterol test every 5 years, or more often if your doctor advises it.       Have a diabetes test (fasting glucose) every three years. If you are at risk for diabetes, you should have this test more often.       At age 65, have a bone density scan (DEXA) to check for osteoporosis (brittle bone disease).    Shots:    Get a flu shot each year.    Get a tetanus shot every 10 years.    Talk to your doctor about your pneumonia vaccines. There are now two you should receive - Pneumovax (PPSV 23) and Prevnar (PCV 13).    Talk to your pharmacist about the shingles vaccine.    Talk to your doctor about the hepatitis B vaccine.    Nutrition:     Eat at least 5 servings of fruits and vegetables each day.      Eat whole-grain bread, whole-wheat pasta and brown rice instead of white grains and rice.      Get adequate about Calcium and Vitamin D.     Lifestyle    Exercise at least 150 minutes a week (30 minutes a day, 5 days a week). This will help you control your weight and prevent disease.      Limit alcohol to one drink per day.      No smoking.       Wear sunscreen to prevent skin cancer.       See your dentist twice a year for an exam and cleaning.      See your eye doctor every 1 to 2 years to screen for  conditions such as glaucoma, macular degeneration, cataracts, etc.    Personalized Prevention Plan  You are due for the preventive services outlined below.  Your care team is available to assist you in scheduling these services.  If you have already completed any of these items, please share that information with your care team to update in your medical record.    Health Maintenance Due   Topic Date Due     Osteoporosis Screening  Never done     COVID-19 Vaccine (1 of 2) Never done     Zoster (Shingles) Vaccine (1 of 2) Never done     Pneumococcal Vaccine (1 of 1 - PPSV23) Never done     Annual Wellness Visit  02/08/2020     Diptheria Tetanus Pertussis (DTAP/TDAP/TD) Vaccine (2 - Td) 11/04/2020     PHQ-2  01/01/2021
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
## 2021-03-31 ENCOUNTER — TELEPHONE (OUTPATIENT)
Dept: FAMILY MEDICINE | Facility: OTHER | Age: 66
End: 2021-03-31

## 2021-04-08 ENCOUNTER — TRANSFERRED RECORDS (OUTPATIENT)
Dept: HEALTH INFORMATION MANAGEMENT | Facility: CLINIC | Age: 66
End: 2021-04-08

## 2021-04-09 ENCOUNTER — NURSE TRIAGE (OUTPATIENT)
Dept: FAMILY MEDICINE | Facility: OTHER | Age: 66
End: 2021-04-09

## 2021-04-09 ENCOUNTER — OFFICE VISIT (OUTPATIENT)
Dept: FAMILY MEDICINE | Facility: OTHER | Age: 66
End: 2021-04-09
Attending: FAMILY MEDICINE
Payer: MEDICARE

## 2021-04-09 VITALS
DIASTOLIC BLOOD PRESSURE: 68 MMHG | RESPIRATION RATE: 16 BRPM | HEART RATE: 86 BPM | TEMPERATURE: 99 F | BODY MASS INDEX: 28.89 KG/M2 | WEIGHT: 157 LBS | SYSTOLIC BLOOD PRESSURE: 128 MMHG | OXYGEN SATURATION: 96 % | HEIGHT: 62 IN

## 2021-04-09 DIAGNOSIS — R07.0 THROAT PAIN: ICD-10-CM

## 2021-04-09 DIAGNOSIS — R50.9 FEVER AND CHILLS: ICD-10-CM

## 2021-04-09 DIAGNOSIS — J32.9 VIRAL SINUSITIS: Primary | ICD-10-CM

## 2021-04-09 DIAGNOSIS — B97.89 VIRAL SINUSITIS: Primary | ICD-10-CM

## 2021-04-09 LAB
DEPRECATED S PYO AG THROAT QL EIA: NEGATIVE
SPECIMEN SOURCE: NORMAL
SPECIMEN SOURCE: NORMAL
STREP GROUP A PCR: NOT DETECTED

## 2021-04-09 PROCEDURE — 87651 STREP A DNA AMP PROBE: CPT | Mod: ZL | Performed by: FAMILY MEDICINE

## 2021-04-09 PROCEDURE — U0005 INFEC AGEN DETEC AMPLI PROBE: HCPCS | Mod: ZL | Performed by: FAMILY MEDICINE

## 2021-04-09 PROCEDURE — G0463 HOSPITAL OUTPT CLINIC VISIT: HCPCS

## 2021-04-09 PROCEDURE — 999N001174 HC STATISTIC STREP A RAPID: Mod: ZL | Performed by: FAMILY MEDICINE

## 2021-04-09 PROCEDURE — 99214 OFFICE O/P EST MOD 30 MIN: CPT | Performed by: FAMILY MEDICINE

## 2021-04-09 PROCEDURE — U0003 INFECTIOUS AGENT DETECTION BY NUCLEIC ACID (DNA OR RNA); SEVERE ACUTE RESPIRATORY SYNDROME CORONAVIRUS 2 (SARS-COV-2) (CORONAVIRUS DISEASE [COVID-19]), AMPLIFIED PROBE TECHNIQUE, MAKING USE OF HIGH THROUGHPUT TECHNOLOGIES AS DESCRIBED BY CMS-2020-01-R: HCPCS | Mod: ZL | Performed by: FAMILY MEDICINE

## 2021-04-09 ASSESSMENT — MIFFLIN-ST. JEOR: SCORE: 1205.4

## 2021-04-09 ASSESSMENT — PAIN SCALES - GENERAL: PAINLEVEL: NO PAIN (1)

## 2021-04-09 NOTE — PATIENT INSTRUCTIONS
Patient Education     * Sinusitis (No Antibiotics)    The sinuses are air-filled spaces within the bones of the face. They connect to the inside of the nose. Sinusitis is an inflammation of the tissue that lines the sinuses. Sinusitis can occur during a cold. It can also happen due to allergies to pollens and other particles in the air. It can cause symptoms such as sinus congestion, headache, sore throat, facial swelling, and a feeling of fullness. It may also cause a low-grade fever. Your sinusitis does not include an infection with bacteria. Because of this, antibiotics are not used to treat this problem.  Home care    Drink plenty of water, hot tea, and other liquids. This may help thin nasal mucus. It also may help your sinuses drain fluids.    Heat may help soothe painful areas of your face. Use a towel soaked in hot water. Or,  the shower and direct the warm spray onto your face. Using a vaporizer along with a menthol rub at night may also help soothe symptoms.     An expectorant with guaifenesin may help thin nasal mucus and help your sinuses drain fluids.    You can use an over-the-counter decongestant, unless a similar medicine was prescribed to you. Nasal sprays work the fastest. Use one that contains phenylephrine or oxymetazoline. First blow your nose gently. Then use the spray. Do not use these medicines more often than directed on the label. If you do, your symptoms may get worse. You may also take pills that contain pseudoephedrine. Don t use products that combine multiple medicines. This is because side effects may be increased. Read all medicine labels. You can also ask the pharmacist for help. (People with high blood pressure should not use decongestants. They can raise blood pressure.)    Over-the-counter antihistamines may help if allergies contributed to your sinusitis.      Use acetaminophen or ibuprofen to control pain, unless another pain medicine was prescribed to you. If you have  chronic liver or kidney disease or ever had a stomach ulcer, talk with your healthcare provider before using these medicines. (Aspirin should never be taken by anyone under age 18 who is ill with a fever. It may cause severe liver damage.)    Use nasal rinses or irrigation as instructed by your healthcare provider.    Don't smoke. This can make symptoms worse.  Follow-up care  Follow up with your healthcare provider or our staff if you are NOT better in 1 week.  When to seek medical advice  Call your healthcare provider if any of these occur:    Green or yellow fluid draining from your nose or into your throat    Facial pain or headache that gets worse    Stiff neck    Unusual drowsiness or confusion    Swelling of your forehead or eyelids    Vision problems, such as blurred or double vision    Fever of 100.4 F (38 C) or higher, or as directed by your healthcare provider    Seizure    Breathing problems    Symptoms that don't go away in 10 days  For informational purposes only. Not to replace the advice of your health care provider.  Copyright   2018 [employer]. All rights reserved.

## 2021-04-09 NOTE — PROGRESS NOTES
"    Assessment & Plan     1. Viral sinusitis  Likely viral illness, COVID testing pending.  Symptomatic cares recommended, follow-up with test results when available.    2. Throat pain  - Streptococcus A Rapid Scr w Reflx to PCR ([facility] Only)  - Group A Streptococcus PCR Throat Swab  - SARS-CoV-2 COVID-19 Virus (Coronavirus) by PCR    3. Fever and chills  - Symptomatic COVID-19 Virus (Coronavirus) by PCR    Return if symptoms worsen or fail to improve.    Carolyn Ritter MD  [facility]      Rodrigue Gonzalez is a 66 year old who presents for the following health issues     HPI     Acute Illness  Acute illness concerns: URI  Onset/Duration: Wednesday  Symptoms:  Fever: YES  Chills/Sweats: YES- chills  Headache (location?): no  Sinus Pressure: YES  Conjunctivitis:  no  Ear Pain: YES: right  Rhinorrhea: YES  Congestion: YES  Sore Throat: YES  Cough: YES  Wheeze: no  Decreased Appetite: YES  Nausea: no  Vomiting: no  Diarrhea: no  Dysuria/Freq.: no  Dysuria or Hematuria: no  Fatigue/Achiness: YES- both   Sick/Strep Exposure: no  Therapies tried and outcome: Zicam  Scratchy throat started on Wednesday.  She took a Zicam, which usually helps.  Her symptoms didn't improve, so she took a few more doses spread out.  Overnight, a cough developed and her eyes were watering.  On Thursday, she took Vitamin C, no improvement in symptoms and fever and chills developed.  Dry cough persisted, sneezing developed as well.  Patient has not energy and just doesn't feel well.      Review of Systems   Constitutional, HEENT, cardiovascular, pulmonary, gi and gu systems are negative, except as otherwise noted.      Objective    /68 (BP Location: Left arm, Patient Position: Sitting, Cuff Size: Adult Regular)   Pulse 86   Temp 99  F (37.2  C) (Tympanic)   Resp 16   Ht 1.575 m (5' 2\")   Wt 71.2 kg (157 lb)   SpO2 96%   BMI 28.72 kg/m    Body mass index is 28.72 kg/m .  Physical Exam   GENERAL: " alert and no distress  EYES: Eyes grossly normal to inspection, PERRL and conjunctivae and sclerae normal  HENT: ear canals and TM's normal, nose and mouth without ulcers or lesions  NECK: no adenopathy  RESP: lungs clear to auscultation - no rales, rhonchi or wheezes  CV: regular rates and rhythm, normal S1 S2, no S3 or S4 and no murmur, click or rub  PSYCH: mentation appears normal, affect normal/bright    Rapid strep negative

## 2021-04-09 NOTE — TELEPHONE ENCOUNTER
Patient experiencing sinus congestion with fever (temp. 100.2) on 4/8/21. Coughing (uncontrollably), chills and sneezing. See protocol for details.     No known exposure to covid 19, 2nd covid 19 vaccine x 3 weeks ago.     Appointment scheduled with Dr. Marx:    Next 5 appointments (look out 90 days)    Apr 09, 2021  3:45 PM  (Arrive by 3:30 PM)  SHORT with Carolyn Ritter MD  [facility] ([facility] ) [address] [address]  [phone]   Apr 15, 2021 10:30 AM  (Arrive by 10:15 AM)  Nurse Only with [facility] NURSE  [facility] ([facility] ) [address] [address]  [phone]          Reason for Disposition    COVID-19 Home Isolation, questions about    Additional Information    Negative: SEVERE difficulty breathing (e.g., struggling for each breath, speaks in single words)    Negative: Difficult to awaken or acting confused (e.g., disoriented, slurred speech)    Negative: Bluish (or gray) lips or face now    Negative: Shock suspected (e.g., cold/pale/clammy skin, too weak to stand, low BP, rapid pulse)    Negative: Sounds like a life-threatening emergency to the triager    Negative: [1] COVID-19 exposure AND [2] no symptoms    Negative: [1] Lives with someone known to have influenza (flu test positive) AND [2] flu-like symptoms (e.g., cough, runny nose, sore throat, SOB; with or without fever)    Negative: [1] Adult with possible COVID-19 symptoms AND [2] triager concerned about severity of symptoms or other causes    Negative: COVID-19 vaccine reaction suspected (e.g., fever, headache, muscle aches) occurring during days 1-3 after getting vaccine    Negative: COVID-19 vaccine, questions about    Negative: COVID-19 and breastfeeding, questions about    Negative: SEVERE or constant chest pain or pressure (Exception: mild central chest pain, present  "only when coughing)    Negative: MODERATE difficulty breathing (e.g., speaks in phrases, SOB even at rest, pulse 100-120)    Negative: [1] Headache AND [2] stiff neck (can't touch chin to chest)    Negative: MILD difficulty breathing (e.g., minimal/no SOB at rest, SOB with walking, pulse <100)    Negative: Chest pain or pressure    Negative: Patient sounds very sick or weak to the triager    Negative: Fever > 103 F (39.4 C)    Negative: [1] Fever > 101 F (38.3 C) AND [2] age > 60    Negative: [1] Fever > 100.0 F (37.8 C) AND [2] bedridden (e.g., nursing home patient, CVA, chronic illness, recovering from surgery)    Negative: [1] HIGH RISK patient (e.g., age > 64 years, diabetes, heart or lung disease, weak immune system) AND [2] new or worsening symptoms    Negative: [1] HIGH RISK patient AND [2] influenza is widespread in the community AND [3] ONE OR MORE respiratory symptoms: cough, sore throat, runny or stuffy nose    Negative: [1] HIGH RISK patient AND [2] influenza exposure within the last 7 days AND [3] ONE OR MORE respiratory symptoms: cough, sore throat, runny or stuffy nose    Negative: Fever present > 3 days (72 hours)    Negative: [1] Fever returns after gone for over 24 hours AND [2] symptoms worse or not improved    Negative: [1] Continuous (nonstop) coughing interferes with work or school AND [2] no improvement using cough treatment per protocol    Negative: [1] COVID-19 infection suspected by caller or triager AND [2] mild symptoms (cough, fever, or others) AND [3] no complications or SOB    Negative: Cough present > 3 weeks    Negative: [1] COVID-19 diagnosed by positive lab test AND [2] mild symptoms (e.g., cough, fever, others) AND [3] no complications or SOB    Negative: [1] COVID-19 diagnosed by HCP (doctor, NP or PA) AND [2] mild symptoms (e.g., cough, fever, others) AND [3] no complications or SOB    Answer Assessment - Initial Assessment Questions  1. COVID-19 DIAGNOSIS: \"Who made your " "Coronavirus (COVID-19) diagnosis?\" \"Was it confirmed by a positive lab test?\" If not diagnosed by a HCP, ask \"Are there lots of cases (community spread) where you live?\" (See public health department website, if unsure)        Patient has not been diagnosed with covid 19    2. COVID-19 EXPOSURE: \"Was there any known exposure to COVID before the symptoms began?\" CDC Definition of close contact: within 6 feet (2 meters) for a total of 15 minutes or more over a 24-hour period.         No known exposure - Patient reports 3 weeks out since 2nd covid 19 shot    3. ONSET: \"When did the COVID-19 symptoms start?\"       4/7/21    4. WORST SYMPTOM: \"What is your worst symptom?\" (e.g., cough, fever, shortness of breath, muscle aches)      Congestion and drainage    5. COUGH: \"Do you have a cough?\" If so, ask: \"How bad is the cough?\"        Yes, reports cough to be uncontrollable. (cough is dry)    6. FEVER: \"Do you have a fever?\" If so, ask: \"What is your temperature, how was it measured, and when did it start?\"        Yes, 100.2 on 4/8/21    7. RESPIRATORY STATUS: \"Describe your breathing?\" (e.g., shortness of breath, wheezing, unable to speak)         Slight shortness of breath due to congestion and cough    8. BETTER-SAME-WORSE: \"Are you getting better, staying the same or getting worse compared to yesterday?\"  If getting worse, ask, \"In what way?\"        Same     9. HIGH RISK DISEASE: \"Do you have any chronic medical problems?\" (e.g., asthma, heart or lung disease, weak immune system, obesity, etc.)      No     10. PREGNANCY: \"Is there any chance you are pregnant?\" \"When was your last menstrual period?\"        No     11. OTHER SYMPTOMS: \"Do you have any other symptoms?\"  (e.g., chills, fatigue, headache, loss of smell or taste, muscle pain, sore throat; new loss of smell or taste especially support the diagnosis of COVID-19)        Cough, chills, slight shortness of breath, congestion and sneezing.    Protocols used: " CORONAVIRUS (COVID-19) DIAGNOSED OR [id number] 1.3

## 2021-04-09 NOTE — NURSING NOTE
"Chief Complaint   Patient presents with     URI       Initial /68 (BP Location: Left arm, Patient Position: Sitting, Cuff Size: Adult Regular)   Pulse 86   Temp 99  F (37.2  C) (Tympanic)   Resp 16   Ht 1.575 m (5' 2\")   Wt 71.2 kg (157 lb)   SpO2 96%   BMI 28.72 kg/m   Estimated body mass index is 28.72 kg/m  as calculated from the following:    Height as of this encounter: 1.575 m (5' 2\").    Weight as of this encounter: 71.2 kg (157 lb).  Medication Reconciliation: complete  Jeni Byrne MA  "

## 2021-04-10 LAB
LABORATORY COMMENT REPORT: NORMAL
SARS-COV-2 RNA RESP QL NAA+PROBE: NEGATIVE
SARS-COV-2 RNA RESP QL NAA+PROBE: NORMAL
SPECIMEN SOURCE: NORMAL
SPECIMEN SOURCE: NORMAL

## 2021-04-15 ENCOUNTER — HOSPITAL ENCOUNTER (OUTPATIENT)
Dept: PHYSICAL THERAPY | Facility: OTHER | Age: 66
Discharge: HOME | End: 2021-04-15
Attending: FAMILY MEDICINE | Admitting: FAMILY MEDICINE
Payer: OTHER MISCELLANEOUS

## 2021-04-15 ENCOUNTER — ALLIED HEALTH/NURSE VISIT (OUTPATIENT)
Dept: FAMILY MEDICINE | Facility: OTHER | Age: 66
End: 2021-04-15
Attending: FAMILY MEDICINE
Payer: MEDICARE

## 2021-04-15 DIAGNOSIS — Z23 NEED FOR PROPHYLACTIC VACCINATION AND INOCULATION AGAINST INFLUENZA: Primary | ICD-10-CM

## 2021-04-15 PROCEDURE — 90714 TD VACC NO PRESV 7 YRS+ IM: CPT

## 2021-04-15 PROCEDURE — 97035 APP MDLTY 1+ULTRASOUND EA 15: CPT | Mod: GP

## 2021-04-15 PROCEDURE — G0009 ADMIN PNEUMOCOCCAL VACCINE: HCPCS

## 2021-04-15 PROCEDURE — 97162 PT EVAL MOD COMPLEX 30 MIN: CPT | Mod: GP

## 2021-04-15 PROCEDURE — 97140 MANUAL THERAPY 1/> REGIONS: CPT | Mod: GP,59

## 2021-04-15 NOTE — PROGRESS NOTES
04/15/21 0901   General Information   Type of Visit Initial OP Ortho PT Evaluation   Start of Care Date 04/15/21   Referring Physician Dr. Ritter   Patient/Family Goals Statement lessen pain   Orders Evaluate and Treat   Date of Order 03/09/21   Certification Required? No   Medical Diagnosis Chronic B LBP with R sciatica   Surgical/Medical history reviewed Yes   Precautions/Limitations no known precautions/limitations   General Information Comments PMH - see chart ( Lumbago, sciatica, hyperlipidemia, HTN)   Body Part(s)   Body Part(s) Lumbar Spine/SI   Presentation and Etiology   Pertinent history of current problem (include personal factors and/or comorbidities that impact the POC) Patient reports her back pain has worsened in the last few months.  She reports she has not been working out at the fitness club initially due to Covid and it being closed.  She was seen by Dr. Marx and is now referred to physical therapy.  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  Patient has had previous physical therapy which has significantly helped in the past.  She states she has been doing her exercises from PT when she was last seen in January 2020.   Impairments A. Pain;D. Decreased ROM;E. Decreased flexibility;F. Decreased strength and endurance;H. Impaired gait   Functional Limitations perform activities of daily living;perform desired leisure / sports activities   Symptom Location Right low back/gluteal with intermittent shooting pains into the right posterior thigh   How/Where did it occur At work   Onset date of current episode/exacerbation 09/14/09   Chronicity Chronic   Pain rating (0-10 point scale) Best (/10);Worst (/10)   Best (/10) 1   Worst (/10) 7   Pain quality A. Sharp;B. Dull;C. Aching;E. Shooting;F. Stabbing   Frequency of pain/symptoms A. Constant   Pain/symptoms are: The same all the time   Pain/symptoms exacerbated by  A. Sitting;B. Walking;C. Lifting;D. Carrying;H. Overhead reach;I. Bending;J. ADL;M. Other;K. Home tasks   Pain exacerbation comment Standing, sleeping, and traveling   Pain/symptoms eased by A. Sitting;B. Walking;C. Rest;D. Nothing;E. Changing positions;F. Certain positions;I. OTC medication(s)   Progression of symptoms since onset: Worsened   Current Level of Function   Patient role/employment history G. Disabled   Living environment [facility]   Fall Risk Screen   Fall screen completed by PT   Have you fallen 2 or more times in the past year? No   Have you fallen and had an injury in the past year? No   Is patient a fall risk? No   Abuse Screen (yes response referral indicated)   Feels Unsafe at Home or Work/School no   Feels Threatened by Someone no   Does Anyone Try to Keep You From Having Contact with Others or Doing Things Outside Your Home? no   Physical Signs of Abuse Present no   System Outcome Measures   Outcome Measures Low Back Pain (see Osw[place] and Adrian)   Lumbar Spine/SI Objective Findings   Observation No acute distress   Posture Left shoulder and iliac crest are elevated as compared to the right   Gait/Locomotion Nonantalgic   Flexion ROM 0-52 w pain R LB   Extension ROM 0-14 w mild pain R LB   Right Side Bending ROM 0-20 w R LBP   Left Side Bending ROM 0-24   Pelvic Screen Positive for left on left anterior sacral torsion dysfunction   Hip Screen Negative   Transversus Abdominus Strength (Woody Leg Lowering-deg) She was able to withstand minimal to moderate resistance with her trunk flexors and extensors   Lumbar/Hip/Knee/Foot Strength Comments She is able to heel and toe walk well   Hamstring Flexibility Decreased right more so than left   Quadricep Flexibility Decreased bilaterally   Piriformis Flexibility Decreased flexibility on the right side   SLR Negative   Slump Test Negative   Segmental Mobility Posterior rotation left L3-L5   Palpation Significant soft tissue tension and tenderness  palpation along the right gluteal/piriformis muscles.  Mild tenderness along the right quadratus lumborum and lumbar paraspinal muscles.  She is point tender over the right SI joint   Planned Therapy Interventions   Planned Therapy Interventions joint mobilization;manual therapy;ROM;strengthening;stretching   Planned Modality Interventions   Planned Modality Interventions Comments Modalities as indicated   Clinical Impression   Criteria for Skilled Therapeutic Interventions Met yes, treatment indicated   PT Diagnosis Right low back pain/right SI joint dysfunction   Influenced by the following impairments Pain, decreased mobility, decreased strength   Functional limitations due to impairments Sitting, walking, lifting, carrying, reaching, bending, ADLs, household tasks   Clinical Presentation Evolving/Changing   Clinical Presentation Rationale Progression of symptoms and functional limitations   Clinical Decision Making (Complexity) Moderate complexity   Therapy Frequency 2 times/Week   Predicted Duration of Therapy Intervention (days/wks) Up to 6 weeks   Risk & Benefits of therapy have been explained Yes   Patient, Family & other staff in agreement with plan of care Yes   Clinical Impression Comments Patient presents with right low back/gluteal pain with positive SI joint dysfunction and significant soft tissue restrictions   Education Assessment   Barriers to Learning No barriers   ORTHO GOALS   PT Ortho Eval Goals 1;2;3   Ortho Goal 1   Goal Identifier STG 1   Goal Description Decreased pain when at its worst was 6/10   Target Date 04/29/21   Ortho Goal 2   Goal Identifier LTG 1   Goal Description She will demonstrate independence with home exercise program   Target Date 05/27/21   Ortho Goal 3   Goal Identifier LTG 2   Goal Description Patient will be able to walk 1+ miles without significant increased back pain 75% of the time   Target Date 05/27/21   Total Evaluation Time   PT Eval, Moderate Complexity Minutes  ([id number]) 26

## 2021-04-21 ENCOUNTER — TELEPHONE (OUTPATIENT)
Dept: FAMILY MEDICINE | Facility: OTHER | Age: 66
End: 2021-04-21

## 2021-04-21 NOTE — TELEPHONE ENCOUNTER
11:17 AM    Reason for Call: Phone Call    Description: Lisa still has her sinus infection and was wondering if Dr Ritter will prescribe something for this?     Was an appointment offered for this call? No  If yes : Appointment type              Date    Preferred method for responding to this message: Telephone Call  What is your phone number ? [phone]    If we cannot reach you directly, may we leave a detailed response at the number you provided? Yes    Can this message wait until your PCP/provider returns, if available today? YES, patient knows doctor is out of the office until Thursday 4/22/2021    Kennedi Galindo

## 2021-04-21 NOTE — TELEPHONE ENCOUNTER
Spoke with patient she does not want a visit right now, she will wait a few days and call back if she believes she needs an office visit for treatment.

## 2021-05-04 ENCOUNTER — HOSPITAL ENCOUNTER (OUTPATIENT)
Dept: PHYSICAL THERAPY | Facility: OTHER | Age: 66
End: 2021-05-04
Attending: FAMILY MEDICINE
Payer: OTHER MISCELLANEOUS

## 2021-05-04 PROCEDURE — 97035 APP MDLTY 1+ULTRASOUND EA 15: CPT | Mod: GP

## 2021-05-04 PROCEDURE — 97140 MANUAL THERAPY 1/> REGIONS: CPT | Mod: GP

## 2021-05-06 ENCOUNTER — HOSPITAL ENCOUNTER (OUTPATIENT)
Dept: PHYSICAL THERAPY | Facility: OTHER | Age: 66
End: 2021-05-06
Attending: FAMILY MEDICINE
Payer: OTHER MISCELLANEOUS

## 2021-05-06 PROCEDURE — 97035 APP MDLTY 1+ULTRASOUND EA 15: CPT | Mod: GP

## 2021-05-06 PROCEDURE — 97140 MANUAL THERAPY 1/> REGIONS: CPT | Mod: GP

## 2021-05-11 ENCOUNTER — HOSPITAL ENCOUNTER (OUTPATIENT)
Dept: PHYSICAL THERAPY | Facility: OTHER | Age: 66
End: 2021-05-11
Attending: FAMILY MEDICINE
Payer: OTHER MISCELLANEOUS

## 2021-05-11 PROCEDURE — 97140 MANUAL THERAPY 1/> REGIONS: CPT | Mod: GP

## 2021-05-11 PROCEDURE — 97035 APP MDLTY 1+ULTRASOUND EA 15: CPT | Mod: GP

## 2021-05-13 ENCOUNTER — HOSPITAL ENCOUNTER (OUTPATIENT)
Dept: PHYSICAL THERAPY | Facility: OTHER | Age: 66
End: 2021-05-13
Attending: FAMILY MEDICINE
Payer: OTHER MISCELLANEOUS

## 2021-05-13 PROCEDURE — 97035 APP MDLTY 1+ULTRASOUND EA 15: CPT | Mod: GP

## 2021-05-13 PROCEDURE — 97140 MANUAL THERAPY 1/> REGIONS: CPT | Mod: GP

## 2021-05-18 ENCOUNTER — HOSPITAL ENCOUNTER (OUTPATIENT)
Dept: PHYSICAL THERAPY | Facility: OTHER | Age: 66
End: 2021-05-18
Attending: FAMILY MEDICINE
Payer: OTHER MISCELLANEOUS

## 2021-05-18 PROCEDURE — 97140 MANUAL THERAPY 1/> REGIONS: CPT

## 2021-05-18 PROCEDURE — 97035 APP MDLTY 1+ULTRASOUND EA 15: CPT

## 2021-05-20 ENCOUNTER — HOSPITAL ENCOUNTER (OUTPATIENT)
Dept: PHYSICAL THERAPY | Facility: OTHER | Age: 66
End: 2021-05-20
Attending: FAMILY MEDICINE
Payer: OTHER MISCELLANEOUS

## 2021-05-20 PROCEDURE — 97035 APP MDLTY 1+ULTRASOUND EA 15: CPT

## 2021-05-20 PROCEDURE — 97140 MANUAL THERAPY 1/> REGIONS: CPT

## 2021-06-28 ENCOUNTER — TELEPHONE (OUTPATIENT)
Dept: FAMILY MEDICINE | Facility: OTHER | Age: 66
End: 2021-06-28

## 2021-06-28 NOTE — TELEPHONE ENCOUNTER
She can cut back to three times weekly, she can do this with progesterone as well.  Every step down in dosing may bring on menopausal symptoms (hot flashes, sweats, etc)

## 2021-06-28 NOTE — TELEPHONE ENCOUNTER
8:39 AM    Reason for Call: Phone Call    Description: Patient called and has questions regarding cutting back on a Rx. She states that today is 20 years on the med. Please call patient back for further discussion/advisement.     Was an appointment offered for this call? No  If yes : Appointment type              Date    Preferred method for responding to this message: Telephone Call  What is your phone number ? [phone]    If we cannot reach you directly, may we leave a detailed response at the number you provided? Yes    Can this message wait until your PCP/provider returns, if available today? Not applicable, Provider is in today    Isatu Lind

## 2021-08-04 NOTE — TELEPHONE ENCOUNTER
Patient called wondering if she could be prescribed something else instead of the progesterone and premarin.  Please call her back

## 2021-08-04 NOTE — TELEPHONE ENCOUNTER
We should probably just have her schedule an appointment later this week or next week to discuss all this.

## 2021-08-05 ENCOUNTER — OFFICE VISIT (OUTPATIENT)
Dept: FAMILY MEDICINE | Facility: OTHER | Age: 66
End: 2021-08-05
Attending: FAMILY MEDICINE
Payer: COMMERCIAL

## 2021-08-05 VITALS
DIASTOLIC BLOOD PRESSURE: 70 MMHG | OXYGEN SATURATION: 97 % | SYSTOLIC BLOOD PRESSURE: 126 MMHG | HEART RATE: 83 BPM | HEIGHT: 62 IN | TEMPERATURE: 97.8 F | WEIGHT: 157 LBS | BODY MASS INDEX: 28.89 KG/M2

## 2021-08-05 DIAGNOSIS — Z78.0 MENOPAUSE: Primary | ICD-10-CM

## 2021-08-05 PROCEDURE — G0463 HOSPITAL OUTPT CLINIC VISIT: HCPCS

## 2021-08-05 PROCEDURE — 99214 OFFICE O/P EST MOD 30 MIN: CPT | Performed by: FAMILY MEDICINE

## 2021-08-05 RX ORDER — CITALOPRAM HYDROBROMIDE 10 MG/1
10 TABLET ORAL DAILY
Qty: 30 TABLET | Refills: 2 | Status: SHIPPED | OUTPATIENT
Start: 2021-08-05 | End: 2021-09-21

## 2021-08-05 ASSESSMENT — MIFFLIN-ST. JEOR: SCORE: 1205.4

## 2021-08-05 ASSESSMENT — PAIN SCALES - GENERAL: PAINLEVEL: MILD PAIN (3)

## 2021-08-05 NOTE — NURSING NOTE
"Chief Complaint   Patient presents with     Menopausal Sx       Initial /70 (BP Location: Right arm, Patient Position: Chair, Cuff Size: Adult Regular)   Pulse 83   Temp 97.8  F (36.6  C) (Tympanic)   Ht 1.575 m (5' 2\")   Wt 71.2 kg (157 lb)   SpO2 97%   BMI 28.72 kg/m   Estimated body mass index is 28.72 kg/m  as calculated from the following:    Height as of this encounter: 1.575 m (5' 2\").    Weight as of this encounter: 71.2 kg (157 lb).  Medication Reconciliation: complete  Ele Alvarez LPN    "

## 2021-08-05 NOTE — PROGRESS NOTES
"    Assessment & Plan     1. Menopause  After discussion of options, she does mention that her daughter takes citalopram for her symptoms.  We elected to start with that.  We won't change her hormone dosing for a few months.  Follow-up in 4 weeks for medication review, sooner as needed.  - citalopram (CELEXA) 10 MG tablet; Take 1 tablet (10 mg) by mouth daily  Dispense: 30 tablet; Refill: 2    30 minutes are spent in chart review, patient visit, and in documentation.    Return in about 4 weeks (around 9/2/2021) for Medication review.    Carolyn Ritter MD  [facility] - [place] is a 66 year old who presents for the following health issues     HPI     Concern - Menopause  Onset: 2 years increased sx since med lowered  Description: hot, sweating, emotional up and down, brain fog,   Intensity: moderate  Progression of Symptoms:  worsening  Accompanying Signs & Symptoms: symptoms all over   Previous history of similar problem: on going   Precipitating factors:        Worsened by: none  Alleviating factors:        Improved by: none   Therapies tried and outcome: progesterone  Patient has had worsening symptoms since decreasing hormone dose.  She does not the hot flashes and night sweats, but more so she is having worsening mood symptoms.  She knows she does have stress in her life and this is likely be contributing to symptoms.      Review of Systems   Constitutional, HEENT, cardiovascular, pulmonary, gi and gu systems are negative, except as otherwise noted.      Objective    /70 (BP Location: Right arm, Patient Position: Chair, Cuff Size: Adult Regular)   Pulse 83   Temp 97.8  F (36.6  C) (Tympanic)   Ht 1.575 m (5' 2\")   Wt 71.2 kg (157 lb)   SpO2 97%   BMI 28.72 kg/m    Body mass index is 28.72 kg/m .  Physical Exam   GENERAL: healthy, alert and no distress  PSYCH: mentation appears normal, affect normal/bright            "

## 2021-08-06 ENCOUNTER — TELEPHONE (OUTPATIENT)
Dept: FAMILY MEDICINE | Facility: OTHER | Age: 66
End: 2021-08-06

## 2021-08-26 DIAGNOSIS — G89.29 CHRONIC BILATERAL LOW BACK PAIN WITH RIGHT-SIDED SCIATICA: ICD-10-CM

## 2021-08-26 DIAGNOSIS — M54.41 CHRONIC BILATERAL LOW BACK PAIN WITH RIGHT-SIDED SCIATICA: ICD-10-CM

## 2021-08-27 RX ORDER — IBUPROFEN 800 MG/1
TABLET, FILM COATED ORAL
Qty: 90 TABLET | Refills: 0 | Status: SHIPPED | OUTPATIENT
Start: 2021-08-27 | End: 2021-08-31

## 2021-08-27 NOTE — TELEPHONE ENCOUNTER
ibuprofen      Last Written Prescription Date:  8/12/20  Last Fill Quantity: 90,   # refills: 0  Last Office Visit: 8/5/21  Future Office visit:    Next 5 appointments (look out 90 days)    Sep 09, 2021 10:15 AM  (Arrive by 10:00 AM)  SHORT with Carolyn Ritter MD  [facility] ([facility] ) [address]  [facility] [address]  [phone]

## 2021-08-31 DIAGNOSIS — M54.41 CHRONIC BILATERAL LOW BACK PAIN WITH RIGHT-SIDED SCIATICA: ICD-10-CM

## 2021-08-31 DIAGNOSIS — G89.29 CHRONIC BILATERAL LOW BACK PAIN WITH RIGHT-SIDED SCIATICA: ICD-10-CM

## 2021-08-31 NOTE — TELEPHONE ENCOUNTER
Pharmacy requesting Dr.St. Hernandez, Insurance will not cover previous refill received from [facility].     Order pended.

## 2021-09-01 RX ORDER — IBUPROFEN 800 MG/1
TABLET, FILM COATED ORAL
Qty: 90 TABLET | Refills: 2 | Status: SHIPPED | OUTPATIENT
Start: 2021-09-01 | End: 2022-04-01

## 2021-09-20 NOTE — PROGRESS NOTES
"    Assessment & Plan     1. Hyperlipidemia, unspecified hyperlipidemia type  Labs updated, follow-up with results when available.  - Lipid Profile (Chol, Trig, HDL, LDL calc); Future  - Lipid Profile (Chol, Trig, HDL, LDL calc)    2. Benign essential hypertension  As above.  - Basic metabolic panel; Future  - Basic metabolic panel    3. Menopause  Will increase dose of Celexa to 20 mg daily.  Patient is also due for DEXA scan, ordered.  - citalopram (CELEXA) 20 MG tablet; Take 1 tablet (20 mg) by mouth daily  Dispense: 30 tablet; Refill: 2  - DX Hip/Pelvis/Spine; Future    4. Menopausal syndrome (hot flashes)  Will continue medication at current dose for now.  Once symptoms are stabilized with Celexa, will decreased dosing to twice weekly.  Patient is agreeable to this plan.  - estrogen conj (PREMARIN) 0.3 MG tablet; Take 1 tablet (0.3 mg) by mouth three times a week  Dispense: 36 tablet; Refill: 1  - progesterone (PROMETRIUM) 100 MG capsule; Take 1 capsule (100 mg) by mouth three times a week  Dispense: 36 capsule; Refill: 1    5. Lesion of labia  Will refer to OB/Gyn for evaluation of changing lesions as patient is quite concerned.  - Ob/Gyn Referral       BMI:   Estimated body mass index is 28.55 kg/m  as calculated from the following:    Height as of this encounter: 1.575 m (5' 2\").    Weight as of this encounter: 70.8 kg (156 lb 1.6 oz).     Return in about 6 months (around 3/21/2022) for Chronic Disease Management, Medication review.    Carolyn Ritter MD  [facility] - [place] is a 66 year old who presents for the following health issues     HPI     Hyperlipidemia Follow-Up      Are you regularly taking any medication or supplement to lower your cholesterol?   No    Are you having muscle aches or other side effects that you think could be caused by your cholesterol lowering medication?  No    Hypertension Follow-up      Do you check your blood pressure regularly " "outside of the clinic? Yes     Are you following a low salt diet? No    Are your blood pressures ever more than 140 on the top number (systolic) OR more   than 90 on the bottom number (diastolic), for example 140/90? No      How many servings of fruits and vegetables do you eat daily?  2-3    On average, how many sweetened beverages do you drink each day (Examples: soda, juice, sweet tea, etc.  Do NOT count diet or artificially sweetened beverages)?   0    How many days per week do you exercise enough to make your heart beat faster? 3 or less    How many minutes a day do you exercise enough to make your heart beat faster? 20 - 29    How many days per week do you miss taking your medication? 0    Medication Followup of Celexa    Taking Medication as prescribed: yes    Side Effects:  None    Medication Helping Symptoms:  Yes    Patient states that at the start, the celexa seemed to help with mood lability and hot flashes.  She does not recently symptoms have slightly worsened again.  She wonders about a dose adjustment.     Concern - Derm Problem  Onset: couple weeks ago  Description: lump under nipple, lumps in perineum, lump on Rt side of neck  Intensity: mild  Progression of Symptoms:  worsening  Accompanying Signs & Symptoms: pain on the left side of neck  Previous history of similar problem: none  Precipitating factors:        Worsened by: none  Alleviating factors:        Improved by: none  Therapies tried and outcome:  none   Patient is mostly concerned about lesions in labia and in perineum.      Review of Systems   Constitutional, HEENT, cardiovascular, pulmonary, gi and gu systems are negative, except as otherwise noted.      Objective    /62 (BP Location: Right arm, Patient Position: Sitting, Cuff Size: Adult Regular)   Pulse 67   Temp 97.6  F (36.4  C) (Tympanic)   Resp 16   Ht 1.575 m (5' 2\")   Wt 70.8 kg (156 lb 1.6 oz)   SpO2 98%   BMI 28.55 kg/m    Body mass index is 28.55 kg/m .  Physical " Exam   GENERAL: healthy, alert and no distress  PSYCH: mentation appears normal, affect normal/bright

## 2021-09-21 ENCOUNTER — OFFICE VISIT (OUTPATIENT)
Dept: FAMILY MEDICINE | Facility: OTHER | Age: 66
End: 2021-09-21
Attending: FAMILY MEDICINE
Payer: COMMERCIAL

## 2021-09-21 VITALS
HEIGHT: 62 IN | OXYGEN SATURATION: 98 % | HEART RATE: 67 BPM | DIASTOLIC BLOOD PRESSURE: 62 MMHG | RESPIRATION RATE: 16 BRPM | WEIGHT: 156.1 LBS | TEMPERATURE: 97.6 F | SYSTOLIC BLOOD PRESSURE: 132 MMHG | BODY MASS INDEX: 28.73 KG/M2

## 2021-09-21 DIAGNOSIS — N95.1 MENOPAUSAL SYNDROME (HOT FLASHES): ICD-10-CM

## 2021-09-21 DIAGNOSIS — I10 BENIGN ESSENTIAL HYPERTENSION: ICD-10-CM

## 2021-09-21 DIAGNOSIS — Z78.0 MENOPAUSE: ICD-10-CM

## 2021-09-21 DIAGNOSIS — N90.89 LESION OF LABIA: ICD-10-CM

## 2021-09-21 DIAGNOSIS — E78.5 HYPERLIPIDEMIA, UNSPECIFIED HYPERLIPIDEMIA TYPE: Primary | ICD-10-CM

## 2021-09-21 LAB
ANION GAP SERPL CALCULATED.3IONS-SCNC: 4 MMOL/L (ref 3–14)
BUN SERPL-MCNC: 13 MG/DL (ref 7–30)
CALCIUM SERPL-MCNC: 9.3 MG/DL (ref 8.5–10.1)
CHLORIDE BLD-SCNC: 104 MMOL/L (ref 94–109)
CHOLEST SERPL-MCNC: 233 MG/DL
CO2 SERPL-SCNC: 32 MMOL/L (ref 20–32)
CREAT SERPL-MCNC: 0.72 MG/DL (ref 0.52–1.04)
FASTING STATUS PATIENT QL REPORTED: YES
GFR SERPL CREATININE-BSD FRML MDRD: 88 ML/MIN/1.73M2
GLUCOSE BLD-MCNC: 97 MG/DL (ref 70–99)
HDLC SERPL-MCNC: 76 MG/DL
HOLD SPECIMEN: NORMAL
LDLC SERPL CALC-MCNC: 133 MG/DL
NONHDLC SERPL-MCNC: 157 MG/DL
POTASSIUM BLD-SCNC: 3.4 MMOL/L (ref 3.4–5.3)
SODIUM SERPL-SCNC: 140 MMOL/L (ref 133–144)
TRIGL SERPL-MCNC: 121 MG/DL

## 2021-09-21 PROCEDURE — G0463 HOSPITAL OUTPT CLINIC VISIT: HCPCS

## 2021-09-21 PROCEDURE — 82374 ASSAY BLOOD CARBON DIOXIDE: CPT | Mod: ZL | Performed by: FAMILY MEDICINE

## 2021-09-21 PROCEDURE — 36415 COLL VENOUS BLD VENIPUNCTURE: CPT | Mod: ZL | Performed by: FAMILY MEDICINE

## 2021-09-21 PROCEDURE — 99214 OFFICE O/P EST MOD 30 MIN: CPT | Performed by: FAMILY MEDICINE

## 2021-09-21 PROCEDURE — 80061 LIPID PANEL: CPT | Mod: ZL | Performed by: FAMILY MEDICINE

## 2021-09-21 RX ORDER — PROGESTERONE 100 MG/1
100 CAPSULE ORAL
Qty: 36 CAPSULE | Refills: 1 | Status: SHIPPED | OUTPATIENT
Start: 2021-09-22 | End: 2021-12-09

## 2021-09-21 RX ORDER — CITALOPRAM HYDROBROMIDE 20 MG/1
20 TABLET ORAL DAILY
Qty: 30 TABLET | Refills: 2 | Status: SHIPPED | OUTPATIENT
Start: 2021-09-21 | End: 2021-12-07

## 2021-09-21 ASSESSMENT — MIFFLIN-ST. JEOR: SCORE: 1201.31

## 2021-09-21 ASSESSMENT — PAIN SCALES - GENERAL: PAINLEVEL: MILD PAIN (2)

## 2021-09-21 NOTE — NURSING NOTE
"Chief Complaint   Patient presents with     Lipids     Hypertension       Initial /62 (BP Location: Right arm, Patient Position: Sitting, Cuff Size: Adult Regular)   Pulse 67   Temp 97.6  F (36.4  C) (Tympanic)   Resp 16   Ht 1.575 m (5' 2\")   Wt 70.8 kg (156 lb 1.6 oz)   SpO2 98%   BMI 28.55 kg/m   Estimated body mass index is 28.55 kg/m  as calculated from the following:    Height as of this encounter: 1.575 m (5' 2\").    Weight as of this encounter: 70.8 kg (156 lb 1.6 oz).  Medication Reconciliation: complete  Jeni Byrne MA  "

## 2021-09-30 ENCOUNTER — OFFICE VISIT (OUTPATIENT)
Dept: OBGYN | Facility: OTHER | Age: 66
End: 2021-09-30
Attending: OBSTETRICS & GYNECOLOGY
Payer: COMMERCIAL

## 2021-09-30 VITALS
SYSTOLIC BLOOD PRESSURE: 122 MMHG | HEIGHT: 62 IN | RESPIRATION RATE: 16 BRPM | OXYGEN SATURATION: 96 % | HEART RATE: 83 BPM | DIASTOLIC BLOOD PRESSURE: 68 MMHG | WEIGHT: 156 LBS | BODY MASS INDEX: 28.71 KG/M2

## 2021-09-30 DIAGNOSIS — N90.89 LESION OF LABIA: ICD-10-CM

## 2021-09-30 PROCEDURE — 99203 OFFICE O/P NEW LOW 30 MIN: CPT | Performed by: OBSTETRICS & GYNECOLOGY

## 2021-09-30 PROCEDURE — G0463 HOSPITAL OUTPT CLINIC VISIT: HCPCS

## 2021-09-30 ASSESSMENT — MIFFLIN-ST. JEOR: SCORE: 1200.86

## 2021-09-30 ASSESSMENT — PAIN SCALES - GENERAL: PAINLEVEL: NO PAIN (0)
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
## 2021-09-30 NOTE — NURSING NOTE
"Chief Complaint   Patient presents with     Consult     lumps on labia.        Initial /68   Pulse 83   Resp 16   Ht 1.575 m (5' 2\")   Wt 70.8 kg (156 lb)   SpO2 96%   BMI 28.53 kg/m   Estimated body mass index is 28.53 kg/m  as calculated from the following:    Height as of this encounter: 1.575 m (5' 2\").    Weight as of this encounter: 70.8 kg (156 lb).  Medication Reconciliation: complete  Cierra John LPN    "

## 2021-10-03 ENCOUNTER — HEALTH MAINTENANCE LETTER (OUTPATIENT)
Age: 66
End: 2021-10-03

## 2021-10-05 NOTE — TELEPHONE ENCOUNTER
Prescription picked up by:  Lisa Angeles  ID Verified:  Yes     VM left to schedule with Dr. Sera Martinez.

## 2021-10-07 ENCOUNTER — HOSPITAL ENCOUNTER (OUTPATIENT)
Dept: BONE DENSITY | Facility: HOSPITAL | Age: 66
Discharge: HOME | End: 2021-10-07
Attending: FAMILY MEDICINE | Admitting: FAMILY MEDICINE
Payer: MEDICARE

## 2021-10-07 DIAGNOSIS — Z78.0 MENOPAUSE: ICD-10-CM

## 2021-10-07 DIAGNOSIS — G89.29 CHRONIC BILATERAL LOW BACK PAIN WITH RIGHT-SIDED SCIATICA: ICD-10-CM

## 2021-10-07 DIAGNOSIS — M54.41 CHRONIC BILATERAL LOW BACK PAIN WITH RIGHT-SIDED SCIATICA: ICD-10-CM

## 2021-10-07 DIAGNOSIS — M54.31 SCIATICA OF RIGHT SIDE: ICD-10-CM

## 2021-10-07 DIAGNOSIS — M81.0 AGE-RELATED OSTEOPOROSIS WITHOUT CURRENT PATHOLOGICAL FRACTURE: Primary | ICD-10-CM

## 2021-10-07 PROCEDURE — 77080 DXA BONE DENSITY AXIAL: CPT

## 2021-10-07 RX ORDER — IBANDRONATE SODIUM 150 MG/1
150 TABLET, FILM COATED ORAL
Qty: 3 TABLET | Refills: 3 | Status: SHIPPED | OUTPATIENT
Start: 2021-10-07 | End: 2021-10-11

## 2021-10-07 RX ORDER — HYDROCODONE BITARTRATE AND ACETAMINOPHEN 7.5; 325 MG/1; MG/1
1 TABLET ORAL EVERY 6 HOURS PRN
Qty: 60 TABLET | Refills: 0 | Status: SHIPPED | OUTPATIENT
Start: 2021-10-07 | End: 2022-09-09

## 2021-10-07 NOTE — TELEPHONE ENCOUNTER
hydrocodone      Last Written Prescription Date:  10/20/20  Last Fill Quantity: 60,   # refills: 0  Last Office Visit: 9/21/21  Future Office visit:       Routing refill request to provider for review/approval because:  Drug not on the [facility], [facility] or [facility] refill protocol or controlled substance

## 2021-10-11 DIAGNOSIS — M81.0 AGE-RELATED OSTEOPOROSIS WITHOUT CURRENT PATHOLOGICAL FRACTURE: ICD-10-CM

## 2021-10-11 RX ORDER — IBANDRONATE SODIUM 150 MG/1
150 TABLET, FILM COATED ORAL
Qty: 3 TABLET | Refills: 3 | Status: SHIPPED | OUTPATIENT
Start: 2021-10-11 | End: 2022-09-19

## 2021-10-11 NOTE — TELEPHONE ENCOUNTER
Prescription sent to wrong pharmacy. Prescription resent to [facility] in [place].

## 2021-10-22 ENCOUNTER — NURSE TRIAGE (OUTPATIENT)
Dept: FAMILY MEDICINE | Facility: OTHER | Age: 66
End: 2021-10-22

## 2021-10-22 DIAGNOSIS — Z20.822 EXPOSURE TO 2019 NOVEL CORONAVIRUS: Primary | ICD-10-CM

## 2021-10-22 NOTE — TELEPHONE ENCOUNTER
Reason for Disposition    [1] CLOSE CONTACT COVID-19 EXPOSURE within last 14 days AND [2] NO symptoms    Additional Information    Negative: COVID-19 lab test positive    Negative: [1] Lives with someone known to have influenza (flu test positive) AND [2] flu-like symptoms (e.g., cough, runny nose, sore throat, SOB; with or without fever)    Negative: [1] Symptoms of COVID-19 (e.g., cough, fever, SOB, or others) AND [2] HCP diagnosed COVID-19 based on symptoms    Negative: [1] Symptoms of COVID-19 (e.g., cough, fever, SOB, or others) AND [2] within 14 days of EXPOSURE (close contact) with diagnosed or suspected COVID-19 patient    Negative: [1] Symptoms of COVID-19 (e.g., cough, fever, SOB, or others) AND [2] lives in an area with community spread    Negative: [1] Symptoms of COVID-19 (e.g., cough, fever, SOB, or others) AND [2] within 14 days of travel from high-risk area for COVID-19 community spread (identified by CDC)    Negative: [1] Difficulty breathing (shortness of breath) occurs AND [2] onset > 14 days after COVID-19 EXPOSURE (Close Contact)    Negative: [1] Dry cough occurs AND [2] onset > 14 days after COVID-19 EXPOSURE    Negative: [1] Wet cough (i.e., white-yellow, yellow, green, or tim colored sputum) AND [2] onset > 14 days after COVID-19 EXPOSURE    Negative: [1] Common cold symptoms AND [2] onset > 14 days after COVID-19 EXPOSURE    Negative: COVID-19 vaccine reaction suspected (e.g., fever, headache, muscle aches) occurring during days 1-3 after getting vaccine    Negative: COVID-19 vaccine, questions about    Negative: [1] CLOSE CONTACT COVID-19 EXPOSURE within last 14 days AND [2] needs COVID-19 lab test to return to work AND [3] NO symptoms    Negative: [1] CLOSE CONTACT COVID-19 EXPOSURE within last 14 days AND [2] exposed person is a  (e.g., police or paramedic) AND [3] NO symptoms    Negative: [1] CLOSE CONTACT COVID-19 EXPOSURE within last 14 days AND [2] exposed person  "is a healthcare worker who was NOT using all recommended personal protective equipment (e.g., a respirator-N95 mask, eye protection, gloves, and gown) AND [3] NO symptoms    Negative: [1] Living or working in a correctional facility, long-term care facility, or shelter (i.e., congregate setting; densely populated) AND [2] where an outbreak has occurred AND [3] NO symptoms    Negative: [1] Has NOT completed COVID-19 vaccine series AND [2] was at a large indoor or outdoor event (e.g., concert, festival, rally, wedding) or been in crowded indoor setting AND [3] within last 14 days    Negative: [1] COVID-19 EXPOSURE AND [2] 15 or more days ago AND [3] NO symptoms    Negative: [1] Living in area with community spread (identified by local PHD) BUT [2] NO symptoms    Negative: [1] Travel from area with community spread (identified by CDC) AND [2] within last 14 days BUT [3] NO symptoms    Negative: [1] No COVID-19 EXPOSURE BUT [2] living with someone who was exposed and who has no symptoms of COVID-19    Negative: [1] Caller concerned that exposure to COVID-19 occurred BUT [2] does not meet COVID-19 EXPOSURE criteria from CDC    Negative: COVID-19 Testing, questions about    Negative: COVID-19 Prevention and Healthy Living, questions about    Negative: COVID -19 Disease, questions about    Answer Assessment - Initial Assessment Questions  1. COVID-19 EXPOSURE: \"Please describe how you were exposed to someone with a COVID-19 infection.\"      Mother in law  2. PLACE of CONTACT: \"Where were you when you were exposed to COVID-19?\" (e.g., home, school, medical waiting room; which city?)      Her home  3. TYPE of CONTACT: \"How much contact was there?\" (e.g., sitting next to, live in same house, work in same office, same building)      Sitting next to  4. DURATION of CONTACT: \"How long were you in contact with the COVID-19 patient?\" (e.g., a few seconds, passed by person, a few minutes, 15 minutes or longer, live with the " "patient)      2 hours  5. MASK: \"Were you wearing a mask?\" \"Was the other person wearing a mask?\" Note: wearing a mask reduces the risk of an otherwise close contact.      no  6. DATE of CONTACT: \"When did you have contact with a COVID-19 patient?\" (e.g., how many days ago)      yesterday  7. COMMUNITY SPREAD: \"Are there lots of cases of COVID-19 (community spread) where you live?\" (See public health department website, if unsure)        yes  8. SYMPTOMS: \"Do you have any symptoms?\" (e.g., fever, cough, breathing difficulty, loss of taste or smell)      no  9. PREGNANCY OR POSTPARTUM: \"Is there any chance you are pregnant?\" \"When was your last menstrual period?\" \"Did you deliver in the last 2 weeks?\"      no  10. HIGH RISK: \"Do you have any heart or lung problems?\" \"Do you have a weak immune system?\" (e.g., heart failure, COPD, asthma, HIV positive, chemotherapy, renal failure, diabetes mellitus, sickle cell anemia, obesity)        no  11. TRAVEL: \"Have you traveled out of the country recently?\" If Yes, ask: \"When and where?\" Also ask about out-of-state travel, since the [facility] has identified some high-risk cities for community spread in the . Note: Travel becomes less relevant if there is widespread community transmission where the patient lives.        no    Protocols used: CORONAVIRUS (COVID-19) EXPOSURE-A- 8.25.2021      "

## 2021-11-05 ENCOUNTER — NURSE TRIAGE (OUTPATIENT)
Dept: FAMILY MEDICINE | Facility: OTHER | Age: 66
End: 2021-11-05

## 2021-11-16 ENCOUNTER — NURSE TRIAGE (OUTPATIENT)
Dept: FAMILY MEDICINE | Facility: OTHER | Age: 66
End: 2021-11-16
Payer: COMMERCIAL

## 2021-11-16 DIAGNOSIS — Z20.822 SUSPECTED 2019 NOVEL CORONAVIRUS INFECTION: Primary | ICD-10-CM

## 2021-11-16 NOTE — TELEPHONE ENCOUNTER
"COVID 19 Nurse Triage Plan/Patient Instructions    Please be aware that novel coronavirus (COVID-19) may be circulating in the community. If you develop symptoms such as fever, cough, or SOB or if you have concerns about the presence of another infection including coronavirus (COVID-19), please contact your health care provider or visit [email or web address].     Disposition/Instructions    Home care recommended. Follow home care protocol based instructions.  Additional COVID19 information to add for patients.   How can I protect others?  If you have symptoms (fever, cough, body aches or trouble breathing): Stay home and away from others (self-isolate) until:    At least 10 days have passed since your symptoms started, And     You ve had no fever--and no medicine that reduces fever--for 1 full day (24 hours), And      Your other symptoms have resolved (gotten better).     If you don t have symptoms, but a test showed that you have COVID-19 (you tested positive):    Stay home and away from others (self-isolate). Follow the tips under \"How do I self-isolate?\" below for 10 days (20 days if you have a weak immune system).    You don't need to be retested for COVID-19 before going back to school or work. As long as you're fever-free and feeling better, you can go back to school, work and other activities after waiting the 10 or 20 days.     How do I self-isolate?    Stay in your own room, even for meals. Use your own bathroom if you can.     Stay away from others in your home. No hugging, kissing or shaking hands. No visitors.    Don t go to work, school or anywhere else.     Clean  high touch  surfaces often (doorknobs, counters, handles, etc.). Use a household cleaning spray or wipes. You ll find a full list on the EPA website:  www.epa.gov/pesticide-registration/list-n-disinfectants-use-against-sars-cov-2.    Cover your mouth and nose with a mask, tissue or washcloth to avoid spreading germs.    Wash your hands " and face often. Use soap and water.    Caregivers in these groups are at risk for severe illness due to COVID-19:  o People 65 years and older  o People who live in a nursing home or long-term care facility  o People with chronic disease (lung, heart, cancer, diabetes, kidney, liver, immunologic)  o People who have a weakened immune system, including those who:  - Are in cancer treatment  - Take medicine that weakens the immune system, such as corticosteroids  - Had a bone marrow or organ transplant  - Have an immune deficiency  - Have poorly controlled HIV or AIDS  - Are obese (body mass index of 40 or higher)  - Smoke regularly    Caregivers should wear gloves while washing dishes, handling laundry and cleaning bedrooms and bathrooms.    Use caution when washing and drying laundry: Don t shake dirty laundry, and use the warmest water setting that you can.    For more tips, go to www.cdc.gov/coronavirus/2019-ncov/downloads/10Things.pdf.    How can I take care of myself?  1. Get lots of rest. Drink extra fluids (unless a doctor has told you not to).     2. Take Tylenol (acetaminophen) for fever or pain. If you have liver or kidney problems, ask your family doctor if it s okay to take Tylenol.     Adults can take either:     650 mg (two 325 mg pills) every 4 to 6 hours, or     1,000 mg (two 500 mg pills) every 8 hours as needed.     Note: Don t take more than 3,000 mg in one day.   Acetaminophen is found in many medicines (both prescribed and over-the-counter medicines). Read all labels to be sure you don t take too much.     For children, check the Tylenol bottle for the right dose. The dose is based on the child s age or weight.    3. If you have other health problems (like cancer, heart failure, an organ transplant or severe kidney disease): Call your specialty clinic if you don t feel better in the next 2 days.    4. Know when to call 911: Emergency warning signs include:    Trouble breathing or shortness of  breath    Pain or pressure in the chest that doesn t go away    Feeling confused like you haven t felt before, or not being able to wake up    Bluish-colored lips or face    What are the symptoms of COVID-19?     The most common symptoms are cough, fever and trouble breathing.     Less common symptoms include body aches, chills, diarrhea (loose, watery poops), fatigue (feeling very tired), headache, runny nose, sore throat and loss of smell.    COVID-19 can cause severe coughing (bronchitis) and lung infection (pneumonia).    How does it spread?     The virus may spread when a person coughs or sneezes into the air. The virus can travel about 6 feet this way, and it can live on surfaces.      Common  (household disinfectants) will kill the virus.    Who is at risk?  Anyone can catch COVID-19 if they re around someone who has the virus.    How can others protect themselves?     Stay away from people who have COVID-19 (or symptoms of COVID-19).    Wash hands often with soap and water. Or, use hand  with at least 60% alcohol.    Avoid touching the eyes, nose or mouth.     Wear a face mask when you go out in public, when sick or when caring for a sick person.    Where can I get more information?    [facility]: About COVID-19: [email or web address]    CDC: What to Do If You re Sick: www.cdc.gov/coronavirus/2019-ncov/about/steps-when-sick.html    CDC: Ending Home Isolation: www.cdc.gov/coronavirus/2019-ncov/hcp/disposition-in-home-patients.html     CDC: Caring for Someone: www.cdc.gov/coronavirus/2019-ncov/if-you-are-sick/care-for-someone.html     [facility]: Interim Guidance for Hospital Discharge to Home: www.health.[facility].[place]/diseases/coronavirus/hcp/hospdischarge.pdf    [facility] clinical trials (COVID-19 research studies): clinicalaffairs.[facility]-clinical-trials     Below are the COVID-19 hotlines at the [place] Department of Health ([facility]). Interpreters are available.    o For health questions: Call 659-004-6559 or 1-655.308.7459 (7 a.m. to 7 p.m.)  o For questions about schools and childcare: Call 180-808-8306 or 1-438.615.9144 (7 a.m. to 7 p.m.)          Thank you for taking steps to prevent the spread of this virus.  o Limit your contact with others.  o Wear a simple mask to cover your cough.  o Wash your hands well and often.    Resources    [facility]: About COVID-19: [email or web address]    CDC: What to Do If You're Sick: www.cdc.gov/coronavirus/2019-ncov/about/steps-when-sick.html    CDC: Ending Home Isolation: www.cdc.gov/coronavirus/2019-ncov/hcp/disposition-in-home-patients.html     CDC: Caring for Someone: www.cdc.gov/coronavirus/2019-ncov/if-you-are-sick/care-for-someone.html     [facility]: Interim Guidance for Hospital Discharge to Home: www.[facility].[place]/diseases/coronavirus/hcp/hospdischarge.pdf    [facility] clinical trials (COVID-19 research studies): clinicalaffairs.[facility]-clinical-trials     Below are the COVID-19 hotlines at the [place] Department of Health ([facility]). Interpreters are available.   o For health questions: Call 665-578-6701 or 1-506.162.5531 (7 a.m. to 7 p.m.)  o For questions about schools and childcare: Call 245-228-9798 or 1-553.701.7903 (7 a.m. to 7 p.m.)                     Reason for Disposition    COVID-19 Home Isolation, questions about    COVID-19 Testing, questions about    Additional Information    Negative: SEVERE difficulty breathing (e.g., struggling for each breath, speaks in single words)    Negative: Difficult to awaken or acting confused (e.g., disoriented, slurred speech)    Negative: Bluish (or gray) lips or face now    Negative: Shock suspected (e.g., cold/pale/clammy skin, too weak to stand, low BP, rapid pulse)    Negative: Sounds like a life-threatening emergency to the triager    Negative: [1] COVID-19 exposure AND [2] no symptoms    Negative: COVID-19 vaccine reaction suspected (e.g.,  fever, headache, muscle aches) occurring 1 to 3 days after getting vaccine    Negative: COVID-19 vaccine, questions about    Negative: [1] Lives with someone known to have influenza (flu test positive) AND [2] flu-like symptoms (e.g., cough, runny nose, sore throat, SOB; with or without fever)    Negative: [1] Adult with possible COVID-19 symptoms AND [2] triager concerned about severity of symptoms or other causes    Negative: COVID-19 and breastfeeding, questions about    Negative: SEVERE or constant chest pain or pressure (Exception: mild central chest pain, present only when coughing)    Negative: MODERATE difficulty breathing (e.g., speaks in phrases, SOB even at rest, pulse 100-120)    Negative: [1] Headache AND [2] stiff neck (can't touch chin to chest)    Negative: MILD difficulty breathing (e.g., minimal/no SOB at rest, SOB with walking, pulse <100)    Negative: Chest pain or pressure    Negative: Patient sounds very sick or weak to the triager    Negative: Fever > 103 F (39.4 C)    Negative: [1] Fever > 101 F (38.3 C) AND [2] age > 60 years    Negative: [1] Fever > 100.0 F (37.8 C) AND [2] bedridden (e.g., nursing home patient, CVA, chronic illness, recovering from surgery)    Negative: HIGH RISK for severe COVID complications (e.g., age > 64 years, obesity with BMI > 25, pregnant, chronic lung disease or other chronic medical condition)  (Exception: Already seen by PCP and no new or worsening symptoms.)    Negative: [1] HIGH RISK patient AND [2] influenza is widespread in the community AND [3] ONE OR MORE respiratory symptoms: cough, sore throat, runny or stuffy nose    Negative: [1] HIGH RISK patient AND [2] influenza exposure within the last 7 days AND [3] ONE OR MORE respiratory symptoms: cough, sore throat, runny or stuffy nose    Negative: [1] COVID-19 infection suspected by caller or triager AND [2] mild symptoms (cough, fever, or others) AND [3] negative COVID-19 rapid test    Negative: Fever  "present > 3 days (72 hours)    Negative: [1] Fever returns after gone for over 24 hours AND [2] symptoms worse or not improved    Negative: [1] Continuous (nonstop) coughing interferes with work or school AND [2] no improvement using cough treatment per protocol    Negative: Cough present > 3 weeks    Answer Assessment - Initial Assessment Questions  1. COVID-19 DIAGNOSIS: \"Who made your Coronavirus (COVID-19) diagnosis?\" \"Was it confirmed by a positive lab test?\" If not diagnosed by a HCP, ask \"Are there lots of cases (community spread) where you live?\" (See public health department website, if unsure)      no  2. COVID-19 EXPOSURE: \"Was there any known exposure to COVID before the symptoms began?\" CDC Definition of close contact: within 6 feet (2 meters) for a total of 15 minutes or more over a 24-hour period.       no  3. ONSET: \"When did the COVID-19 symptoms start?\"       11/113/21  4. WORST SYMPTOM: \"What is your worst symptom?\" (e.g., cough, fever, shortness of breath, muscle aches)      congestion  5. COUGH: \"Do you have a cough?\" If Yes, ask: \"How bad is the cough?\"        Mild cough  6. FEVER: \"Do you have a fever?\" If Yes, ask: \"What is your temperature, how was it measured, and when did it start?\"      no  7. RESPIRATORY STATUS: \"Describe your breathing?\" (e.g., shortness of breath, wheezing, unable to speak)       no  8. BETTER-SAME-WORSE: \"Are you getting better, staying the same or getting worse compared to yesterday?\"  If getting worse, ask, \"In what way?\"      Good better  9. HIGH RISK DISEASE: \"Do you have any chronic medical problems?\" (e.g., asthma, heart or lung disease, weak immune system, obesity, etc.)      no  10. PREGNANCY: \"Is there any chance you are pregnant?\" \"When was your last menstrual period?\"        no  11. OTHER SYMPTOMS: \"Do you have any other symptoms?\"  (e.g., chills, fatigue, headache, loss of smell or taste, muscle pain, sore throat; new loss of smell or taste especially " support the diagnosis of COVID-19)        Sore throat    Protocols used: CORONAVIRUS (COVID-19) DIAGNOSED OR [id number] 8.25.2021

## 2021-11-17 ENCOUNTER — OFFICE VISIT (OUTPATIENT)
Dept: FAMILY MEDICINE | Facility: OTHER | Age: 66
End: 2021-11-17
Attending: NURSE PRACTITIONER
Payer: MEDICARE

## 2021-11-17 ENCOUNTER — TELEPHONE (OUTPATIENT)
Dept: FAMILY MEDICINE | Facility: OTHER | Age: 66
End: 2021-11-17

## 2021-11-17 ENCOUNTER — NURSE TRIAGE (OUTPATIENT)
Dept: FAMILY MEDICINE | Facility: OTHER | Age: 66
End: 2021-11-17
Payer: COMMERCIAL

## 2021-11-17 VITALS
BODY MASS INDEX: 27.6 KG/M2 | SYSTOLIC BLOOD PRESSURE: 116 MMHG | HEART RATE: 72 BPM | HEIGHT: 62 IN | OXYGEN SATURATION: 98 % | WEIGHT: 150 LBS | TEMPERATURE: 98.1 F | DIASTOLIC BLOOD PRESSURE: 64 MMHG

## 2021-11-17 DIAGNOSIS — J02.9 ACUTE PHARYNGITIS, UNSPECIFIED ETIOLOGY: ICD-10-CM

## 2021-11-17 DIAGNOSIS — R05.9 COUGH: Primary | ICD-10-CM

## 2021-11-17 DIAGNOSIS — J01.00 SUBACUTE MAXILLARY SINUSITIS: ICD-10-CM

## 2021-11-17 LAB
DEPRECATED S PYO AG THROAT QL EIA: NEGATIVE
GROUP A STREP BY PCR: NOT DETECTED

## 2021-11-17 PROCEDURE — G0463 HOSPITAL OUTPT CLINIC VISIT: HCPCS

## 2021-11-17 PROCEDURE — 99213 OFFICE O/P EST LOW 20 MIN: CPT | Performed by: NURSE PRACTITIONER

## 2021-11-17 PROCEDURE — U0003 INFECTIOUS AGENT DETECTION BY NUCLEIC ACID (DNA OR RNA); SEVERE ACUTE RESPIRATORY SYNDROME CORONAVIRUS 2 (SARS-COV-2) (CORONAVIRUS DISEASE [COVID-19]), AMPLIFIED PROBE TECHNIQUE, MAKING USE OF HIGH THROUGHPUT TECHNOLOGIES AS DESCRIBED BY CMS-2020-01-R: HCPCS | Mod: ZL | Performed by: NURSE PRACTITIONER

## 2021-11-17 PROCEDURE — 87651 STREP A DNA AMP PROBE: CPT | Mod: ZL | Performed by: NURSE PRACTITIONER

## 2021-11-17 RX ORDER — AZITHROMYCIN 250 MG/1
TABLET, FILM COATED ORAL
Qty: 11 TABLET | Refills: 0 | Status: SHIPPED | OUTPATIENT
Start: 2021-11-17 | End: 2021-11-27

## 2021-11-17 RX ORDER — SACCHAROMYCES BOULARDII 250 MG
250 CAPSULE ORAL 2 TIMES DAILY
Qty: 60 CAPSULE | Refills: 0 | Status: SHIPPED | OUTPATIENT
Start: 2021-11-17 | End: 2021-12-17

## 2021-11-17 ASSESSMENT — MIFFLIN-ST. JEOR: SCORE: 1173.65
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
## 2021-11-17 NOTE — NURSING NOTE
"Chief Complaint   Patient presents with     Sinus Problem       Initial /64 (BP Location: Left arm, Patient Position: Sitting, Cuff Size: Adult Regular)   Pulse 72   Temp 98.1  F (36.7  C) (Tympanic)   Ht 1.575 m (5' 2\")   Wt 68 kg (150 lb)   SpO2 98%   BMI 27.44 kg/m   Estimated body mass index is 27.44 kg/m  as calculated from the following:    Height as of this encounter: 1.575 m (5' 2\").    Weight as of this encounter: 68 kg (150 lb).  Medication Reconciliation: complete  Agatha Montgomery LPN    "

## 2021-11-17 NOTE — TELEPHONE ENCOUNTER
Pharmacist Godfrey in [place]  [phone]    Z-kenzie shows a drug reaction with Celexa .  Please order an alternative if appropriate.      azithromycin (ZITHROMAX) 250 MG tablet      Last Written Prescription Date:  11/17/21-11/27/21  Last Fill Quantity: 11,   # refills: 0  Last Office Visit: 11/17/21  Future Office visit:       Routing refill request to provider for review/approval because:  Drug not on the [facility], [facility] or [facility] refill protocol or controlled substance

## 2021-11-17 NOTE — TELEPHONE ENCOUNTER
Per Lilian Hair she is not concerned with the below.    Pharmacist called back and was updated.    Michelle Galarza RN

## 2021-11-17 NOTE — PATIENT INSTRUCTIONS
Assessment & Plan        Cough  - Group A Streptococcus PCR Throat Swab ([place] ONLY)  - Symptomatic COVID-19 Virus (Coronavirus) by PCR Nose  - Streptococcus A Rapid Scr w Reflx to PCR ([facility]/[place] Only)      Acute pharyngitis, unspecified etiology  - Negative Strep      Subacute maxillary sinusitis  - azithromycin (ZITHROMAX) 250 MG tablet; Take 2 tablets (500 mg) by mouth daily for 1 day, THEN 1 tablet (250 mg) daily for 9 days.      Mucinex OTC  Insure adequate fluid intake  Get plenty of rest  Monitor for temp at home, treat with OTC Tylenol or Ibuprofen per package instruction.  Humidity at home (add bacteriostatic solution to humidifier)  Please return in you do not improve  To UC or ER with persistent, worsening, or concerning symptoms        Lilian Hair CNP  [facility] [place] [facility]

## 2021-11-17 NOTE — TELEPHONE ENCOUNTER
"Patient was scheduled for Covid test at 9:00 today. She did not attend    Reason for Disposition    Patient wants to be seen    Answer Assessment - Initial Assessment Questions  1. ONSET: \"When did the throat start hurting?\" (Hours or days ago)       Saturday night  2. SEVERITY: \"How bad is the sore throat?\" (Scale 1-10; mild, moderate or severe)    - MILD (1-3):  doesn't interfere with eating or normal activities    - MODERATE (4-7): interferes with eating some solids and normal activities    - SEVERE (8-10):  excruciating pain, interferes with most normal activities    - SEVERE DYSPHAGIA: can't swallow liquids, drooling      mild  3. STREP EXPOSURE: \"Has there been any exposure to strep within the past week?\" If so, ask: \"What type of contact occurred?\"       no  4.  VIRAL SYMPTOMS: \"Are there any symptoms of a cold, such as a runny nose, cough, hoarse voice or red eyes?\"       Cough runny nose hoarse voice watery eyes and red  5. FEVER: \"Do you have a fever?\" If so, ask: \"What is your temperature, how was it measured, and when did it start?\"      no  6. PUS ON THE TONSILS: \"Is there pus on the tonsils in the back of your throat?\"      Red  White spts  7. OTHER SYMPTOMS: \"Do you have any other symptoms?\" (e.g., difficulty breathing, headache, rash)      no  8. PREGNANCY: \"Is there any chance you are pregnant?\" \"When was your last menstrual period?\"      no    Protocols used: SORE THROAT-A-[place]      "

## 2021-11-18 LAB — SARS-COV-2 RNA RESP QL NAA+PROBE: NEGATIVE

## 2021-12-06 ENCOUNTER — TELEPHONE (OUTPATIENT)
Dept: FAMILY MEDICINE | Facility: OTHER | Age: 66
End: 2021-12-06
Payer: COMMERCIAL

## 2021-12-06 DIAGNOSIS — I10 BENIGN ESSENTIAL HYPERTENSION: ICD-10-CM

## 2021-12-06 DIAGNOSIS — Z78.0 MENOPAUSE: ICD-10-CM

## 2021-12-06 NOTE — TELEPHONE ENCOUNTER
Patient is questioning premarin and Prometrium - if she should just quit or decrease states celexa is working good

## 2021-12-06 NOTE — TELEPHONE ENCOUNTER
11:22 AM    Reason for Call: Phone Call    Description: pt want to know about a medication, if she is able to stop it completely or decrease again    Was an appointment offered for this call? No  If yes : Appointment type              Date    Preferred method for responding to this message: Telephone Call  What is your phone number ? [phone]    If we cannot reach you directly, may we leave a detailed response at the number you provided? Yes    Can this message wait until your PCP/provider returns, if available today? YES    Petra Stokes

## 2021-12-07 RX ORDER — CITALOPRAM HYDROBROMIDE 20 MG/1
TABLET ORAL
Qty: 30 TABLET | Refills: 3 | Status: SHIPPED | OUTPATIENT
Start: 2021-12-07 | End: 2022-03-09

## 2021-12-07 RX ORDER — HYDROCHLOROTHIAZIDE 25 MG/1
TABLET ORAL
Qty: 90 TABLET | Refills: 3 | Status: SHIPPED | OUTPATIENT
Start: 2021-12-07 | End: 2022-11-28

## 2021-12-08 DIAGNOSIS — N95.1 MENOPAUSAL SYNDROME (HOT FLASHES): ICD-10-CM

## 2021-12-09 RX ORDER — PROGESTERONE 100 MG/1
100 CAPSULE ORAL
Qty: 24 CAPSULE | Refills: 0 | Status: SHIPPED | OUTPATIENT
Start: 2021-12-09 | End: 2022-02-09

## 2021-12-09 NOTE — TELEPHONE ENCOUNTER
progesterone      Last Written Prescription Date:  9/22/21  Last Fill Quantity: 36,   # refills: 1  Last Office Visit: 11/17/21  Future Office visit:       Estrogen 9/22/21 #36 with 1

## 2022-01-03 NOTE — PROGRESS NOTES
"  Assessment & Plan     1. Chronic bilateral low back pain with right-sided sciatica  Letter given for gym membership.  PT referral ordered.  No medication changes recommended at this time, will refill when due.  Follow-up on annual basis (symptoms overall stable)  - Physical Therapy Referral; Future    2. Sciatica of right side  - Physical Therapy Referral; Future       BMI:   Estimated body mass index is 27.44 kg/m  as calculated from the following:    Height as of this encounter: 1.575 m (5' 2\").    Weight as of this encounter: 68 kg (150 lb).     Return in about 1 year (around 1/4/2023) for Follow-up.    Carolyn Ritter MD  [facility]      Subjective   Lisa is a 66 year old who presents for the following health issues     HPI   Occupational Visit     SUBJECTIVE:  Lisa Angeles, 66 year old, female is seen for follow up of occupational injury. Date of injury is 9/24/2009.    Linked Episodes   Type: Episode: Status: Noted: Resolved: Last update: Updated by:   WORK COMP [employer] Active 9/14/2009 1/4/2022  3:04 PM Katiana Hardin LPN      Comments:back injury       Back Pain       Duration: 13 years         Specific cause: lifting    Description:   Location of pain: low back right  Character of pain: sharp, stabbing and gnawing  Pain radiation:radiates into the right buttocks and radiates into the right leg  New numbness or weakness in legs, not attributed to pain:  no     Intensity: Currently 2/10    History:   Pain interferes with job: YES, No  History of back problems: no prior back problems  Any previous MRI or X-rays: Yes- at [place].  Date 5/28/14  Sees a specialist for back pain:  No  Therapies tried without relief: acetaminophen (Tylenol), muscle relaxants, NSAIDs and opioids    Alleviating factors:   Improved by: muscle relaxants and opioids      Precipitating factors:  Worsened by: Lifting, Bending, Standing and Sitting          Accompanying Signs & Symptoms:  Risk of " "Fracture:  Age >64  Risk of Cauda Equina:  None  Risk of Infection:  None  Risk of Cancer:  None  Risk of Ankylosing Spondylitis:  Onset at age <35, male, AND morning back stiffness. no       Patient states since COVID and her gym shutting down for a time, her symptoms flared again.  She would like to get back to the gym, but she feels a \"tune up\" visit with physical therapy would be very beneficial, and I would agree.      No Known Allergies      Review of Systems:  Constitutional, HEENT, cardiovascular, pulmonary, gi and gu systems are negative, except as otherwise noted.      OBJECTIVE:  Vitals:    01/04/22 1510   BP: 118/60   Pulse: 82   Temp: 97.9  F (36.6  C)   SpO2: 97%               Exam:  GENERAL:: healthy, alert and no distress  PSYCH: Alert and oriented times 3; speech- coherent , normal rate and volume; able to articulate logical thoughts, able to abstract reason, no tangential thoughts, no hallucinations or delusions, affect- normal      Labs: No results found for this or any previous visit (from the past 24 hour(s)).      "

## 2022-01-04 ENCOUNTER — OFFICE VISIT (OUTPATIENT)
Dept: FAMILY MEDICINE | Facility: OTHER | Age: 67
End: 2022-01-04
Attending: FAMILY MEDICINE
Payer: OTHER MISCELLANEOUS

## 2022-01-04 VITALS
TEMPERATURE: 97.9 F | BODY MASS INDEX: 27.6 KG/M2 | HEIGHT: 62 IN | HEART RATE: 82 BPM | SYSTOLIC BLOOD PRESSURE: 118 MMHG | DIASTOLIC BLOOD PRESSURE: 60 MMHG | OXYGEN SATURATION: 97 % | WEIGHT: 150 LBS

## 2022-01-04 DIAGNOSIS — M54.41 CHRONIC BILATERAL LOW BACK PAIN WITH RIGHT-SIDED SCIATICA: Primary | ICD-10-CM

## 2022-01-04 DIAGNOSIS — M54.31 SCIATICA OF RIGHT SIDE: ICD-10-CM

## 2022-01-04 DIAGNOSIS — G89.29 CHRONIC BILATERAL LOW BACK PAIN WITH RIGHT-SIDED SCIATICA: Primary | ICD-10-CM

## 2022-01-04 PROCEDURE — 99214 OFFICE O/P EST MOD 30 MIN: CPT | Performed by: FAMILY MEDICINE

## 2022-01-04 ASSESSMENT — MIFFLIN-ST. JEOR: SCORE: 1173.65

## 2022-01-04 ASSESSMENT — PAIN SCALES - GENERAL: PAINLEVEL: MILD PAIN (2)

## 2022-01-04 NOTE — NURSING NOTE
"Chief Complaint   Patient presents with     Work Comp     [id number]       Initial /60 (BP Location: Right arm, Patient Position: Chair, Cuff Size: Adult Large)   Pulse 82   Temp 97.9  F (36.6  C) (Tympanic)   Ht 1.575 m (5' 2\")   Wt 68 kg (150 lb)   SpO2 97%   BMI 27.44 kg/m   Estimated body mass index is 27.44 kg/m  as calculated from the following:    Height as of this encounter: 1.575 m (5' 2\").    Weight as of this encounter: 68 kg (150 lb).  Medication Reconciliation: complete  Ele Alvarez LPN    "

## 2022-01-04 NOTE — LETTER
[facility] [place]  [address]  [place] [address]  Phone: [phone]    January 4, 2022        Lisa Angeles  [address] [place] [address]          To whom it may concern:    RE: Lisa Angeles    Patient should resume her gym membership.  She will be attending [employer] ([facility]viously [employer]  She did have a lapse in her membership due to COVID concerns.      Please contact me for questions or concerns.      Sincerely,        Carolyn Ritter MD

## 2022-01-13 ENCOUNTER — TELEPHONE (OUTPATIENT)
Dept: FAMILY MEDICINE | Facility: OTHER | Age: 67
End: 2022-01-13
Payer: COMMERCIAL

## 2022-01-13 NOTE — TELEPHONE ENCOUNTER
Patient called wondering about her WC physical therapy if this has been approved and if it can be scheuled. Patent also needs a DME for tens unit. Please call patient back at earliest convience. [phone]

## 2022-01-31 ENCOUNTER — NURSE TRIAGE (OUTPATIENT)
Dept: FAMILY MEDICINE | Facility: OTHER | Age: 67
End: 2022-01-31
Payer: COMMERCIAL

## 2022-01-31 NOTE — TELEPHONE ENCOUNTER
Protocol advises patient to be seen within 24 hours for a productive cough and right earache. Patient also reports having right sided neck pain and shoulder pain. Patient has an appointment tomorrow for work comp and would like to be seen back to back for non work comp appointment. Ok per nurse Ngo. Patient is scheduled tomorrow with PCP.   Next 5 appointments (look out 90 days)    Feb 01, 2022  2:30 PM  (Arrive by 2:15 PM)  SHORT with Carolyn Ritter MD  [facility] [address] [address]  [phone]   Feb 01, 2022  2:45 PM  (Arrive by 2:30 PM)  SHORT with Carolyn Ritter MD  [facility] [address] [facility] [address]  [phone]            Reason for Disposition    Earache    Additional Information    Negative: Severe difficulty breathing (e.g., struggling for each breath, speaks in single words)    Negative: Bluish (or gray) lips or face now    Negative: [1] Difficulty breathing AND [2] exposure to flames, smoke, or fumes    Negative: [1] Stridor AND [2] difficulty breathing    Negative: Sounds like a life-threatening emergency to the triager    Negative: [1] Previous asthma attacks AND [2] this feels like asthma attack    Negative: Dry (non-productive) cough (i.e., no sputum or minimal clear sputum)    Negative: Chest pain  (Exception: MILD central chest pain, present only when coughing)    Negative: Difficulty breathing    Negative: Patient sounds very sick or weak to the triager    Negative: [1] Coughed up blood AND [2] > 1 tablespoon (15 ml) (Exception: blood-tinged sputum)    Negative: Fever > 103 F (39.4 C)    Negative: [1] Fever > 101 F (38.3 C) AND [2] age > 60    Negative: [1] Fever > 100.0 F (37.8 C) AND [2] bedridden (e.g., nursing home patient, CVA, chronic illness, recovering from surgery)    Negative: [1] Fever > 100.0 F  "(37.8 C) AND [2] diabetes mellitus or weak immune system (e.g., HIV positive, cancer chemo, splenectomy, organ transplant, chronic steroids)    Negative: Wheezing is present    Negative: SEVERE coughing spells (e.g., whooping sound after coughing, vomiting after coughing)    Negative: [1] Continuous (nonstop) coughing interferes with work or school AND [2] no improvement using cough treatment per Care Advice    Negative: Coughing up tim-colored (reddish-brown) sputum    Negative: Fever present > 3 days (72 hours)    Negative: [1] Fever returns after gone for over 24 hours AND [2] symptoms worse or not improved    Negative: [1] Using nasal washes and pain medicine > 24 hours AND [2] sinus pain (around cheekbone or eye) persists    Answer Assessment - Initial Assessment Questions  1. ONSET: \"When did the cough begin?\"       About a week ago  2. SEVERITY: \"How bad is the cough today?\"       Not super bad, but once I start it is hard to stop  3. RESPIRATORY DISTRESS: \"Describe your breathing.\"       good  4. FEVER: \"Do you have a fever?\" If so, ask: \"What is your temperature, how was it measured, and when did it start?\"      no  5. SPUTUM: \"Describe the color of your sputum\" (clear, white, yellow, green)      white  6. HEMOPTYSIS: \"Are you coughing up any blood?\" If so ask: \"How much?\" (flecks, streaks, tablespoons, etc.)      no  7. CARDIAC HISTORY: \"Do you have any history of heart disease?\" (e.g., heart attack, congestive heart failure)       no  8. LUNG HISTORY: \"Do you have any history of lung disease?\"  (e.g., pulmonary embolus, asthma, emphysema)      no  9. PE RISK FACTORS: \"Do you have a history of blood clots?\" (or: recent major surgery, recent prolonged travel, bedridden)      no  10. OTHER SYMPTOMS: \"Do you have any other symptoms?\" (e.g., runny nose, wheezing, chest pain)        no  11. PREGNANCY: \"Is there any chance you are pregnant?\" \"When was your last menstrual period?\"        no  12. TRAVEL: \"Have " "you traveled out of the country in the last month?\" (e.g., travel history, exposures)        no    Protocols used: COUGH - ACUTE [id number]      "

## 2022-02-01 ENCOUNTER — OFFICE VISIT (OUTPATIENT)
Dept: FAMILY MEDICINE | Facility: OTHER | Age: 67
End: 2022-02-01
Attending: FAMILY MEDICINE
Payer: MEDICARE

## 2022-02-01 VITALS
TEMPERATURE: 97.7 F | DIASTOLIC BLOOD PRESSURE: 66 MMHG | HEIGHT: 62 IN | RESPIRATION RATE: 16 BRPM | BODY MASS INDEX: 28.73 KG/M2 | HEART RATE: 78 BPM | WEIGHT: 156.1 LBS | SYSTOLIC BLOOD PRESSURE: 126 MMHG | OXYGEN SATURATION: 98 %

## 2022-02-01 VITALS
WEIGHT: 156.1 LBS | RESPIRATION RATE: 16 BRPM | HEIGHT: 62 IN | BODY MASS INDEX: 28.73 KG/M2 | DIASTOLIC BLOOD PRESSURE: 66 MMHG | HEART RATE: 78 BPM | TEMPERATURE: 97.7 F | OXYGEN SATURATION: 98 % | SYSTOLIC BLOOD PRESSURE: 128 MMHG

## 2022-02-01 DIAGNOSIS — M54.41 CHRONIC BILATERAL LOW BACK PAIN WITH RIGHT-SIDED SCIATICA: Primary | ICD-10-CM

## 2022-02-01 DIAGNOSIS — G89.29 CHRONIC BILATERAL LOW BACK PAIN WITH RIGHT-SIDED SCIATICA: Primary | ICD-10-CM

## 2022-02-01 DIAGNOSIS — R11.10 VOMITING AND DIARRHEA: ICD-10-CM

## 2022-02-01 DIAGNOSIS — M54.31 SCIATICA OF RIGHT SIDE: ICD-10-CM

## 2022-02-01 DIAGNOSIS — R05.9 COUGH: Primary | ICD-10-CM

## 2022-02-01 DIAGNOSIS — R19.7 VOMITING AND DIARRHEA: ICD-10-CM

## 2022-02-01 LAB
FLUAV RNA SPEC QL NAA+PROBE: NEGATIVE
FLUBV RNA RESP QL NAA+PROBE: NEGATIVE
RSV RNA SPEC NAA+PROBE: NEGATIVE
SARS-COV-2 RNA RESP QL NAA+PROBE: NEGATIVE

## 2022-02-01 PROCEDURE — G0463 HOSPITAL OUTPT CLINIC VISIT: HCPCS | Mod: 25

## 2022-02-01 PROCEDURE — 87637 SARSCOV2&INF A&B&RSV AMP PRB: CPT | Mod: ZL | Performed by: FAMILY MEDICINE

## 2022-02-01 PROCEDURE — 99213 OFFICE O/P EST LOW 20 MIN: CPT | Performed by: FAMILY MEDICINE

## 2022-02-01 PROCEDURE — G0463 HOSPITAL OUTPT CLINIC VISIT: HCPCS

## 2022-02-01 ASSESSMENT — PAIN SCALES - GENERAL
PAINLEVEL: MODERATE PAIN (4)
PAINLEVEL: MODERATE PAIN (4)

## 2022-02-01 ASSESSMENT — MIFFLIN-ST. JEOR
SCORE: 1196.31
SCORE: 1196.31

## 2022-02-01 NOTE — NURSING NOTE
"Chief Complaint   Patient presents with     Work Comp       Initial /66 (BP Location: Right arm, Patient Position: Sitting, Cuff Size: Adult Regular)   Pulse 78   Temp 97.7  F (36.5  C) (Tympanic)   Resp 16   Ht 1.575 m (5' 2\")   Wt 70.8 kg (156 lb 1.6 oz)   SpO2 98%   BMI 28.55 kg/m   Estimated body mass index is 28.55 kg/m  as calculated from the following:    Height as of this encounter: 1.575 m (5' 2\").    Weight as of this encounter: 70.8 kg (156 lb 1.6 oz).  Medication Reconciliation: complete  Jeni Byrne MA  "

## 2022-02-01 NOTE — PROGRESS NOTES
"  Assessment & Plan     1. Cough  Patient would like to make sure she isn't missing any sort of infection.  Mutiplex sent.  Follow-up with results when available.  - Symptomatic; Yes; 1/18/2022 Influenza A/B & SARS-CoV2 (COVID-19) Virus PCR Multiplex; Future  - Symptomatic; Yes; 1/18/2022 Influenza A/B & SARS-CoV2 (COVID-19) Virus PCR Multiplex Nose    2. Vomiting and diarrhea  - Symptomatic; Yes; 1/18/2022 Influenza A/B & SARS-CoV2 (COVID-19) Virus PCR Multiplex; Future  - Symptomatic; Yes; 1/18/2022 Influenza A/B & SARS-CoV2 (COVID-19) Virus PCR Multiplex Nose       BMI:   Estimated body mass index is 28.55 kg/m  as calculated from the following:    Height as of this encounter: 1.575 m (5' 2\").    Weight as of this encounter: 70.8 kg (156 lb 1.6 oz).     Return if symptoms worsen or fail to improve.    Carolyn Ritter MD  [facility] [place] RENEA Gonzalez is a 67 year old who presents for the following health issues     HPI     Acute Illness  Acute illness concerns: URI  Onset/Duration: 2 weeks  Symptoms:  Fever: no  Chills/Sweats: YES- but she thinks it could be part of menopause or back pain  Headache (location?): no  Sinus Pressure: no  Conjunctivitis:  YES- watery  Ear Pain: YES: right  Rhinorrhea: YES  Congestion: no  Sore Throat: YES  Cough: YES  Wheeze: no  Decreased Appetite: YES  Nausea: YES  Vomiting: YES  Diarrhea: YES  Dysuria/Freq.: no  Dysuria or Hematuria: no  Fatigue/Achiness: YES  Sick/Strep Exposure: no  Therapies tried and outcome: neti pot, nasal spray, sleep      Review of Systems   Constitutional, HEENT, cardiovascular, pulmonary, gi and gu systems are negative, except as otherwise noted.      Objective    /66   Pulse 78   Temp 97.7  F (36.5  C) (Tympanic)   Resp 16   Ht 1.575 m (5' 2\")   Wt 70.8 kg (156 lb 1.6 oz)   SpO2 98%   BMI 28.55 kg/m    Body mass index is 28.55 kg/m .  Physical Exam   GENERAL: alert and no distress  EYES: Eyes grossly normal " to inspection, PERRL and conjunctivae and sclerae normal  HENT: ear canals and TM's normal, nose and mouth without ulcers or lesions  NECK: no adenopathy  RESP: lungs clear to auscultation - no rales, rhonchi or wheezes  CV: regular rates and rhythm, normal S1 S2, no S3 or S4 and no murmur, click or rub  PSYCH: mentation appears normal, affect normal/bright

## 2022-02-01 NOTE — PROGRESS NOTES
Occupational Visit     SUBJECTIVE:  Lisa Angeles, 67 year old, female is seen for follow up of occupational injury. Date of injury is 09/14/09.    Linked Episodes   Type: Episode: Status: Noted: Resolved: Last update: Updated by:   WORK COMP [employer] Active 9/14/2009 2/1/2022  1:55 PM Katiana Hardin LPN      Comments:back injury       Back Pain       Duration: 2009        Specific cause: lifting    Description:   Location of pain: low back right  Character of pain: dull ache, stabbing, gnawing, burning, fullness and constant  Pain radiation:radiates into the right buttocks, radiates into the right leg and radiates into the right foot  New numbness or weakness in legs, not attributed to pain:  no     Intensity: Currently 4/10    History:   Pain interferes with job: Not applicable  History of back problems: no prior back problems  Any previous MRI or X-rays: Yes- at [place].  Date 2009  Sees a specialist for back pain:  No  Therapies tried without relief: acetaminophen (Tylenol), activity, heat, massage, muscle relaxants, NSAIDs, opioids, Physical Therapy, rest, sitting, standing and stretch    Alleviating factors:   Improved by: Physical Therapy      Precipitating factors:  Worsened by: Lifting, Bending, Standing, Sitting, Lying Flat, Walking and Coughing    Patient does need a new prescription for TENS unit pads    No Known Allergies      Review of Systems:  Constitutional, HEENT, cardiovascular, pulmonary, gi and gu systems are negative, except as otherwise noted.      OBJECTIVE:  Vitals:    02/01/22 1359   BP: 126/66   Pulse: 78   Resp: 16   Temp: 97.7  F (36.5  C)   SpO2: 98%               Exam:  GENERAL:: healthy, alert and no distress  PSYCH: Alert and oriented times 3      Labs: No results found for this or any previous visit (from the past 24 hour(s)).      ASSESSMENT/PLAN:  1. Chronic bilateral low back pain with right-sided sciatica  Script given, no medication changes needed.  - Tens  Unit/Supplies Order for DME - ONLY FOR DME    2. Sciatica of right side  - Tens Unit/Supplies Order for DME - ONLY FOR DME      Carolyn Ritter MD

## 2022-02-03 ENCOUNTER — TELEPHONE (OUTPATIENT)
Dept: FAMILY MEDICINE | Facility: OTHER | Age: 67
End: 2022-02-03
Payer: COMMERCIAL

## 2022-02-03 NOTE — TELEPHONE ENCOUNTER
1:35 PM    Reason for Call: Phone Call    Description: pt would like to speak with  regarding dermatology concerns. Pt would like some advice on who she should see. Call pt back regarding this.    Was an appointment offered for this call? No  If yes : Appointment type              Date    Preferred method for responding to this message: Telephone Call  What is your phone number ?[phone]   If we cannot reach you directly, may we leave a detailed response at the number you provided? Yes    Can this message wait until your PCP/provider returns, if available today? YES    CAR VAZQUEZ

## 2022-02-04 NOTE — TELEPHONE ENCOUNTER
She might be able to self-schedule with [facility] in [facility]

## 2022-02-04 NOTE — NURSING NOTE
"Chief Complaint   Patient presents with     URI       Initial /66   Pulse 78   Temp 97.7  F (36.5  C) (Tympanic)   Resp 16   Ht 1.575 m (5' 2\")   Wt 70.8 kg (156 lb 1.6 oz)   SpO2 98%   BMI 28.55 kg/m   Estimated body mass index is 28.55 kg/m  as calculated from the following:    Height as of this encounter: 1.575 m (5' 2\").    Weight as of this encounter: 70.8 kg (156 lb 1.6 oz).  Medication Reconciliation: complete  Jeni Byrne MA  "

## 2022-02-08 DIAGNOSIS — N95.1 MENOPAUSAL SYNDROME (HOT FLASHES): ICD-10-CM

## 2022-02-09 RX ORDER — PROGESTERONE 100 MG/1
CAPSULE ORAL
Qty: 24 CAPSULE | Refills: 1 | Status: SHIPPED | OUTPATIENT
Start: 2022-02-09 | End: 2022-12-16

## 2022-02-09 RX ORDER — CONJUGATED ESTROGENS 0.3 MG/1
TABLET, FILM COATED ORAL
Qty: 24 TABLET | Refills: 1 | Status: SHIPPED | OUTPATIENT
Start: 2022-02-09 | End: 2022-12-16

## 2022-02-22 ENCOUNTER — TRANSFERRED RECORDS (OUTPATIENT)
Dept: HEALTH INFORMATION MANAGEMENT | Facility: CLINIC | Age: 67
End: 2022-02-22
Payer: COMMERCIAL

## 2022-03-08 DIAGNOSIS — Z78.0 MENOPAUSE: ICD-10-CM

## 2022-03-09 RX ORDER — CITALOPRAM HYDROBROMIDE 20 MG/1
TABLET ORAL
Qty: 30 TABLET | Refills: 3 | Status: SHIPPED | OUTPATIENT
Start: 2022-03-09 | End: 2022-08-04

## 2022-03-15 ENCOUNTER — TRANSFERRED RECORDS (OUTPATIENT)
Dept: HEALTH INFORMATION MANAGEMENT | Facility: CLINIC | Age: 67
End: 2022-03-15
Payer: COMMERCIAL

## 2022-03-18 ENCOUNTER — TELEPHONE (OUTPATIENT)
Dept: FAMILY MEDICINE | Facility: OTHER | Age: 67
End: 2022-03-18
Payer: COMMERCIAL

## 2022-03-18 NOTE — TELEPHONE ENCOUNTER
10:55 AM    Reason for Call: Phone Call    Description: Lisa tried to donate blood but they told her that her iron is low 10.5 . Does she need to be seen or is there something she can do or take. Please call Lisa to advise    Was an appointment offered for this call?   No    Preferred method for responding to this message: [phone]    If we cannot reach you directly, may we leave a detailed response at the number you provided?    Yes        Keshia Moran

## 2022-03-18 NOTE — TELEPHONE ENCOUNTER
Most commonly this is iron deficiency but we could always see her next week to draw labs to figure out the cause

## 2022-03-19 NOTE — PROGRESS NOTES
"  Assessment & Plan     1. Anemia, unspecified type  Will recheck hemoglobin and anemia labs.  We discussed possible causes of anemia.  She had researched iron rich foods, she will try incorporating more of these into her diet.  Follow-up with results when available.  - Ferritin; Future  - Folate; Future  - Iron & Iron Binding Capacity; Future  - Reticulocyte count; Future  - Vitamin B12; Future  - CBC with platelets; Future  - Lab Blood Morphology Pathologist Review; Future  - Occult blood stool; Future  - Ferritin  - Folate  - Iron & Iron Binding Capacity  - Vitamin B12  - Lab Blood Morphology Pathologist Review  - Occult blood stool       BMI:   Estimated body mass index is 28.2 kg/m  as calculated from the following:    Height as of this encounter: 1.575 m (5' 2\").    Weight as of this encounter: 69.9 kg (154 lb 3.2 oz).     Return if symptoms worsen or fail to improve.    Carolyn Ritter MD  [facility] - [place] Gonzalez is a 67 year old who presents for the following health issues     HPI     Concern - Follow up Lab Low Iron   Onset: went to donate blood   Description: was told low iron/low hemoglobin  Therapies tried and outcome: None        Review of Systems   Constitutional, HEENT, cardiovascular, pulmonary, gi and gu systems are negative, except as otherwise noted.      Objective    /64 (BP Location: Left arm, Patient Position: Chair, Cuff Size: Adult Regular)   Pulse 72   Temp 98  F (36.7  C) (Tympanic)   Resp 16   Ht 1.575 m (5' 2\")   Wt 69.9 kg (154 lb 3.2 oz)   SpO2 99%   BMI 28.20 kg/m    Body mass index is 28.2 kg/m .  Physical Exam   GENERAL: healthy, alert and no distress  PSYCH: mentation appears normal, affect normal/bright            "

## 2022-03-21 ENCOUNTER — OFFICE VISIT (OUTPATIENT)
Dept: FAMILY MEDICINE | Facility: OTHER | Age: 67
End: 2022-03-21
Attending: FAMILY MEDICINE
Payer: MEDICARE

## 2022-03-21 VITALS
SYSTOLIC BLOOD PRESSURE: 126 MMHG | HEART RATE: 72 BPM | DIASTOLIC BLOOD PRESSURE: 64 MMHG | TEMPERATURE: 98 F | HEIGHT: 62 IN | RESPIRATION RATE: 16 BRPM | WEIGHT: 154.2 LBS | OXYGEN SATURATION: 99 % | BODY MASS INDEX: 28.37 KG/M2

## 2022-03-21 DIAGNOSIS — D64.9 ANEMIA, UNSPECIFIED TYPE: Primary | ICD-10-CM

## 2022-03-21 LAB
BASOPHILS # BLD AUTO: 0 10E3/UL (ref 0–0.2)
BASOPHILS NFR BLD AUTO: 0 %
EOSINOPHIL # BLD AUTO: 0 10E3/UL (ref 0–0.7)
EOSINOPHIL NFR BLD AUTO: 0 %
ERYTHROCYTE [DISTWIDTH] IN BLOOD BY AUTOMATED COUNT: 15 % (ref 10–15)
FERRITIN SERPL-MCNC: 7 NG/ML (ref 8–252)
HCT VFR BLD AUTO: 38.3 % (ref 35–47)
HGB BLD-MCNC: 12.4 G/DL (ref 11.7–15.7)
IRON SATN MFR SERPL: 11 % (ref 15–46)
IRON SERPL-MCNC: 64 UG/DL (ref 35–180)
LYMPHOCYTES # BLD AUTO: 2.1 10E3/UL (ref 0.8–5.3)
LYMPHOCYTES NFR BLD AUTO: 21 %
MCH RBC QN AUTO: 27.5 PG (ref 26.5–33)
MCHC RBC AUTO-ENTMCNC: 32.4 G/DL (ref 31.5–36.5)
MCV RBC AUTO: 85 FL (ref 78–100)
MONOCYTES # BLD AUTO: 1 10E3/UL (ref 0–1.3)
MONOCYTES NFR BLD AUTO: 10 %
NEUTROPHILS # BLD AUTO: 6.9 10E3/UL (ref 1.6–8.3)
NEUTROPHILS NFR BLD AUTO: 69 %
PLATELET # BLD AUTO: 462 10E3/UL (ref 150–450)
RBC # BLD AUTO: 4.51 10E6/UL (ref 3.8–5.2)
RETICS # AUTO: 0.08 10E6/UL (ref 0.03–0.1)
RETICS/RBC NFR AUTO: 1.7 % (ref 0.5–2)
TIBC SERPL-MCNC: 597 UG/DL (ref 240–430)
WBC # BLD AUTO: 10 10E3/UL (ref 4–11)

## 2022-03-21 PROCEDURE — 99213 OFFICE O/P EST LOW 20 MIN: CPT | Performed by: FAMILY MEDICINE

## 2022-03-21 PROCEDURE — 85025 COMPLETE CBC W/AUTO DIFF WBC: CPT | Mod: ZL | Performed by: FAMILY MEDICINE

## 2022-03-21 PROCEDURE — 83550 IRON BINDING TEST: CPT | Mod: ZL | Performed by: FAMILY MEDICINE

## 2022-03-21 PROCEDURE — 36415 COLL VENOUS BLD VENIPUNCTURE: CPT | Mod: ZL | Performed by: FAMILY MEDICINE

## 2022-03-21 PROCEDURE — 85045 AUTOMATED RETICULOCYTE COUNT: CPT | Mod: ZL | Performed by: FAMILY MEDICINE

## 2022-03-21 PROCEDURE — 82607 VITAMIN B-12: CPT | Mod: ZL | Performed by: FAMILY MEDICINE

## 2022-03-21 PROCEDURE — 82746 ASSAY OF FOLIC ACID SERUM: CPT | Mod: ZL | Performed by: FAMILY MEDICINE

## 2022-03-21 PROCEDURE — G0463 HOSPITAL OUTPT CLINIC VISIT: HCPCS

## 2022-03-21 PROCEDURE — 82728 ASSAY OF FERRITIN: CPT | Mod: ZL | Performed by: FAMILY MEDICINE

## 2022-03-21 ASSESSMENT — PAIN SCALES - GENERAL: PAINLEVEL: EXTREME PAIN (8)

## 2022-03-21 NOTE — NURSING NOTE
"Chief Complaint   Patient presents with     Anemia       Initial /64 (BP Location: Left arm, Patient Position: Chair, Cuff Size: Adult Regular)   Pulse 72   Temp 98  F (36.7  C) (Tympanic)   Resp 16   Ht 1.575 m (5' 2\")   Wt 69.9 kg (154 lb 3.2 oz)   SpO2 99%   BMI 28.20 kg/m   Estimated body mass index is 28.2 kg/m  as calculated from the following:    Height as of this encounter: 1.575 m (5' 2\").    Weight as of this encounter: 69.9 kg (154 lb 3.2 oz).  Medication Reconciliation: complete  Pamela M. Lechevalier, LPN    "

## 2022-03-22 LAB
FOLATE SERPL-MCNC: 8.8 NG/ML
VIT B12 SERPL-MCNC: 290 PG/ML (ref 193–986)

## 2022-03-23 LAB
PATH REPORT.COMMENTS IMP SPEC: NORMAL
PATH REPORT.COMMENTS IMP SPEC: NORMAL
PATH REPORT.FINAL DX SPEC: NORMAL
PATH REPORT.MICROSCOPIC SPEC OTHER STN: NORMAL
PATH REPORT.MICROSCOPIC SPEC OTHER STN: NORMAL

## 2022-03-23 PROCEDURE — 85060 BLOOD SMEAR INTERPRETATION: CPT | Performed by: PATHOLOGY

## 2022-03-28 LAB — HEMOCCULT STL QL IA: NEGATIVE

## 2022-03-28 PROCEDURE — 82274 ASSAY TEST FOR BLOOD FECAL: CPT | Mod: ZL | Performed by: FAMILY MEDICINE

## 2022-03-30 ENCOUNTER — TELEPHONE (OUTPATIENT)
Dept: FAMILY MEDICINE | Facility: OTHER | Age: 67
End: 2022-03-30
Payer: COMMERCIAL

## 2022-03-30 DIAGNOSIS — G89.29 CHRONIC BILATERAL LOW BACK PAIN WITH RIGHT-SIDED SCIATICA: ICD-10-CM

## 2022-03-30 DIAGNOSIS — M54.41 CHRONIC BILATERAL LOW BACK PAIN WITH RIGHT-SIDED SCIATICA: ICD-10-CM

## 2022-03-30 NOTE — TELEPHONE ENCOUNTER
10:50 AM    Reason for Call: Phone Call    Description: Patient called in regards to the referral for PT for WC. Advised patient that per documentation it looks as though the referral has been denied. Advised patient to call insurance for WC. Patient called back and gave us the name of the adjusters supervisor (Diya Field) and her contact information ([phone]; [email or web address]). Patient states that we need to reach out to her.     Was an appointment offered for this call? No  If yes : Appointment type              Date    Preferred method for responding to this message: Telephone Call  What is your phone number ?    If we cannot reach you directly, may we leave a detailed response at the number you provided? Yes    Can this message wait until your PCP/provider returns, if available today? Not applicable, HUC please address    Isatu Lind

## 2022-03-31 NOTE — TELEPHONE ENCOUNTER
VALIUM      Last Written Prescription Date:  3-9-2021  Last Fill Quantity: 40,   # refills: 0  Last Office Visit: 3-  Future Office visit:       Routing refill request to provider for review/approval because:  Drug not on the [facility], P or [facility] refill protocol or controlled substance

## 2022-04-01 RX ORDER — DIAZEPAM 5 MG
TABLET ORAL
Qty: 40 TABLET | Refills: 0 | Status: SHIPPED | OUTPATIENT
Start: 2022-04-01 | End: 2022-12-16

## 2022-04-01 RX ORDER — IBUPROFEN 800 MG/1
TABLET, FILM COATED ORAL
Qty: 90 TABLET | Refills: 2 | Status: SHIPPED | OUTPATIENT
Start: 2022-04-01 | End: 2024-04-03

## 2022-04-01 NOTE — TELEPHONE ENCOUNTER
Patient calling inquiring on refill for diazepam as per request below.     Patient is also in need of refill for Ibuprofen 800 mg order pended     ibuprofen (ADVIL/MOTRIN) 800 MG tablet        Last Written Prescription Date:  9/1/21  Last Fill Quantity: 90,   # refills: 2  Last Office Visit: 3/21/22  Future Office visit:

## 2022-04-04 DIAGNOSIS — Z78.0 MENOPAUSE: ICD-10-CM

## 2022-04-06 RX ORDER — CITALOPRAM HYDROBROMIDE 20 MG/1
TABLET ORAL
Qty: 30 TABLET | Refills: 3 | OUTPATIENT
Start: 2022-04-06

## 2022-04-08 ENCOUNTER — HOSPITAL ENCOUNTER (OUTPATIENT)
Dept: PHYSICAL THERAPY | Facility: OTHER | Age: 67
Discharge: HOME | End: 2022-04-08
Attending: FAMILY MEDICINE
Payer: OTHER MISCELLANEOUS

## 2022-04-08 DIAGNOSIS — M54.31 SCIATICA OF RIGHT SIDE: ICD-10-CM

## 2022-04-08 DIAGNOSIS — M54.41 CHRONIC BILATERAL LOW BACK PAIN WITH RIGHT-SIDED SCIATICA: ICD-10-CM

## 2022-04-08 DIAGNOSIS — G89.29 CHRONIC BILATERAL LOW BACK PAIN WITH RIGHT-SIDED SCIATICA: ICD-10-CM

## 2022-04-08 PROCEDURE — 97140 MANUAL THERAPY 1/> REGIONS: CPT | Mod: GP

## 2022-04-08 PROCEDURE — 97162 PT EVAL MOD COMPLEX 30 MIN: CPT | Mod: GP

## 2022-04-08 NOTE — PROGRESS NOTES
04/08/22 0929   General Information   Type of Visit Initial OP Ortho PT Evaluation   Start of Care Date 04/08/22   Referring Physician Dr. Marx   Patient/Family Goals Statement Lessen low back and lower extremity pain   Orders Evaluate and Treat   Date of Order 04/04/22   Certification Required? 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  She has been seen previously physical therapy with her last sessions of physical therapy being in April 2021.  She has continued with her home exercises religiously.  She reports she has not been able to use her TENS unit as the supplies needed or not being covered by work comp for a period of time.  She reports her back pain and leg pain worsening in the fall 2021.  She states she eventually was seen by Dr. Marx in January 2022 for her back and leg pain.  She was referred to physical therapy at that time however was not authorized through work comp until now   Impairments A. Pain;D. Decreased ROM;E. Decreased flexibility;F. Decreased strength and endurance;G. Impaired balance;H. Impaired gait;M. Locking or catching   Functional Limitations perform activities of daily living;perform desired leisure / sports activities   Symptom Location Chief complaint of pain is along the right low back/SI joint region she reports a symptom of pain along the right lower gluteal region especially just medial to the ischial tuberosity.  She  also reports intermittent shooting pains into the right lower extremity at times to the foot.   How/Where did it occur At work   Chronicity Chronic   Pain rating (0-10 point scale) Best (/10);Worst (/10)   Best (/10) 2   Worst (/10) 8   Pain quality A. Sharp;B. Dull;C. Aching;E. Shooting;F. Stabbing   Frequency of pain/symptoms A. Constant   Pain/symptoms are: The same all the time   Pain/symptoms exacerbated by A. Sitting;B. Walking;C. Lifting;D. Carrying;G. Certain positions;H. Overhead reach;I. Bending;J. ADL;K. Home tasks;M. Other   Pain exacerbation comment Standing, sleeping   Pain/symptoms eased by K. Other;A. Sitting;C. Rest;E. Changing positions   Pain eased by comment TENS   Progression of symptoms since onset: Worsened   Current Level of Function   Patient role/employment history G. Disabled   Living environment [place]   Fall Risk Screen   Fall screen completed by PT   Have you fallen 2 or more times in the past year? No   Have you fallen and had an injury in the past year? No   Is patient a fall risk? No   Abuse Screen (yes response referral indicated)   Feels Unsafe at Home or Work/School no   Feels Threatened by Someone no   Does Anyone Try to Keep You From Having Contact with Others or Doing Things Outside Your Home? no   Physical Signs of Abuse Present no   Patient needs abuse support services and resources No   Lumbar Spine/SI Objective Findings   Observation No acute distress   Posture The left shoulder and iliac crest were elevated as compared to the right, mild forward head posture   Gait/Locomotion Nonantalgic   Flexion ROM 0-45 w R lower gluteal pain   Extension ROM 0-18   Right Side Bending ROM 0-10 w R LBP   Left Side Bending ROM 0-26   Repeated Extension-Standing ROM Negative   Pelvic Screen Positive for left and left anterior sacral torsion dysfunction   Hip Screen Negative   Transversus Abdominus Strength (Woody Leg Lowering-deg) She was able to withstand minimal to moderate  resistance with her trunk flexors and moderate resistance with trunk extensors   Hip Flexion (L2) Strength Right = 4/5   Knee Extension (L3) Strength 5/5   Ankle Dorsiflexion (L4) Strength 5/5   Ankle Plantar Flexion (S1) Strength 5/5   Hamstring Flexibility Hypomobile on right   Quadricep Flexibility Hypomobile bilaterally   Piriformis Flexibility Hypomobile on right   SLR Negative   Crossover SLR Negative   Slump Test Negative   Lumbar/SI Special Tests Comments No pain with resisted knee flexion, no tenderness at the ischial tuberosity with palpation   Segmental Mobility Posterior rotation left lumbar spine L3-L5   Palpation Soft tissue tension tenderness palpation especially along the right SI joint, the right piriformis and gluteal muscles   Planned Therapy Interventions   Planned Therapy Interventions joint mobilization;manual therapy;ROM;strengthening;transfer training   Planned Modality Interventions   Planned Modality Interventions Comments Modalities as indicated   Clinical Impression   Criteria for Skilled Therapeutic Interventions Met yes, treatment indicated   PT Diagnosis Right low back and lower extremity pain   Influenced by the following impairments Pain, decreased mobility, decreased strength, trouble with walking   Functional limitations due to impairments Sitting, walking, lifting, carrying, reaching, bending, ADLs, household tasks, standing, sleeping   Clinical Presentation Evolving/Changing   Clinical Presentation Rationale Clinical judgment-progression of pain and functional limitations   Clinical Decision Making (Complexity) Moderate complexity   Therapy Frequency 2 times/Week   Predicted Duration of Therapy Intervention (days/wks) Up to 6 weeks   Risk & Benefits of therapy have been explained Yes   Patient, Family & other staff in agreement with plan of care Yes   Clinical Impression Comments Patient presents with low back pain and lower extremity pain with a positive SI joint dysfunction and  significant soft tissue restrictions   Education Assessment   Barriers to Learning No barriers   ORTHO GOALS   PT Ortho Eval Goals 1;2;3   Ortho Goal 1   Goal Identifier STG 1   Goal Description Decrease pain when at its worst to a 7/10   Target Date 04/22/22   Ortho Goal 2   Goal Identifier LTG 1   Goal Description Patient demonstrate independence with home exercise program   Target Date 05/20/22   Ortho Goal 3   Goal Identifier LTG 2   Goal Description Patient is able to walk 1/4-1+ miles without significant increased back or leg pain 75% of the time   Target Date 05/20/22   Total Evaluation Time   PT Rafi, Moderate Complexity Minutes (72564) 30

## 2022-04-13 ENCOUNTER — HOSPITAL ENCOUNTER (OUTPATIENT)
Dept: PHYSICAL THERAPY | Facility: OTHER | Age: 67
Discharge: HOME | End: 2022-04-13
Attending: FAMILY MEDICINE
Payer: OTHER MISCELLANEOUS

## 2022-04-13 PROCEDURE — 97035 APP MDLTY 1+ULTRASOUND EA 15: CPT | Mod: GP

## 2022-04-13 PROCEDURE — 97140 MANUAL THERAPY 1/> REGIONS: CPT | Mod: GP

## 2022-04-19 ENCOUNTER — TELEPHONE (OUTPATIENT)
Dept: FAMILY MEDICINE | Facility: OTHER | Age: 67
End: 2022-04-19
Payer: COMMERCIAL

## 2022-04-19 ENCOUNTER — HOSPITAL ENCOUNTER (OUTPATIENT)
Dept: PHYSICAL THERAPY | Facility: OTHER | Age: 67
Discharge: HOME | End: 2022-04-19
Attending: FAMILY MEDICINE
Payer: OTHER MISCELLANEOUS

## 2022-04-19 PROCEDURE — 97140 MANUAL THERAPY 1/> REGIONS: CPT | Mod: GP

## 2022-04-19 NOTE — TELEPHONE ENCOUNTER
PATIENT IS HAVING DIFFERENT SYMPTOMS AND HAS BEEN SEEING PT. AFTER SEEING ESE THEY WERE TALKING ABOUT MAYBE GETTING MRI.  THIS IS UNDER HER WORK COMP.  PLEASE EITHER CALL [phone] OR SEND MESSAGE THROUGH [employer]. PLEASE LEAVE DETAILED MESSAGE IF NO ANSWER.

## 2022-04-21 NOTE — TELEPHONE ENCOUNTER
We probably need a visit, as this would be under Work Comp.  MRI would need to be approved before being scheduled.

## 2022-04-22 ENCOUNTER — OFFICE VISIT (OUTPATIENT)
Dept: FAMILY MEDICINE | Facility: OTHER | Age: 67
End: 2022-04-22
Attending: FAMILY MEDICINE
Payer: OTHER MISCELLANEOUS

## 2022-04-22 ENCOUNTER — HOSPITAL ENCOUNTER (OUTPATIENT)
Dept: PHYSICAL THERAPY | Facility: OTHER | Age: 67
Discharge: HOME | End: 2022-04-22
Attending: FAMILY MEDICINE
Payer: OTHER MISCELLANEOUS

## 2022-04-22 VITALS
OXYGEN SATURATION: 97 % | BODY MASS INDEX: 28.91 KG/M2 | HEART RATE: 71 BPM | HEIGHT: 62 IN | SYSTOLIC BLOOD PRESSURE: 116 MMHG | RESPIRATION RATE: 16 BRPM | TEMPERATURE: 97.3 F | DIASTOLIC BLOOD PRESSURE: 62 MMHG | WEIGHT: 157.1 LBS

## 2022-04-22 DIAGNOSIS — G89.29 CHRONIC BILATERAL LOW BACK PAIN WITH RIGHT-SIDED SCIATICA: Primary | ICD-10-CM

## 2022-04-22 DIAGNOSIS — M54.41 CHRONIC BILATERAL LOW BACK PAIN WITH RIGHT-SIDED SCIATICA: Primary | ICD-10-CM

## 2022-04-22 DIAGNOSIS — M54.31 SCIATICA OF RIGHT SIDE: ICD-10-CM

## 2022-04-22 PROCEDURE — 97140 MANUAL THERAPY 1/> REGIONS: CPT | Mod: GP

## 2022-04-22 PROCEDURE — 97035 APP MDLTY 1+ULTRASOUND EA 15: CPT | Mod: GP

## 2022-04-22 PROCEDURE — 99213 OFFICE O/P EST LOW 20 MIN: CPT | Performed by: FAMILY MEDICINE

## 2022-04-22 ASSESSMENT — PAIN SCALES - GENERAL: PAINLEVEL: MILD PAIN (3)

## 2022-04-22 NOTE — NURSING NOTE
"Chief Complaint   Patient presents with     Work Comp       Initial /62 (BP Location: Right arm, Patient Position: Sitting, Cuff Size: Adult Regular)   Pulse 71   Temp 97.3  F (36.3  C) (Tympanic)   Resp 16   Ht 1.575 m (5' 2\")   Wt 71.3 kg (157 lb 1.6 oz)   SpO2 97%   BMI 28.73 kg/m   Estimated body mass index is 28.73 kg/m  as calculated from the following:    Height as of this encounter: 1.575 m (5' 2\").    Weight as of this encounter: 71.3 kg (157 lb 1.6 oz).  Medication Reconciliation: complete  Jeni Byrne MA  "

## 2022-04-22 NOTE — PROGRESS NOTES
Occupational Visit     SUBJECTIVE:  Lisa Angeles, 67 year old, female is seen for follow up of occupational injury. Date of injury is 09/14/09.    Linked Episodes   Type: Episode: Status: Noted: Resolved: Last update: Updated by:   WORK COMP [employer] Active 9/14/2009 4/22/2022 11:10 AM Katiana Hardin LPN      Comments:back injury       Back Pain       Duration: 09/2009        Specific cause: lifting    Description:   Location of pain: low back right  Character of pain: sharp, dull ache, stabbing, gnawing, burning, fullness, cramping and constant  Pain radiation:radiates into the right buttocks, radiates into the right leg and radiates into the right foot  New numbness or weakness in legs, not attributed to pain:  no     Intensity: Currently 3/10    History:   Pain interferes with job: Not applicable  History of back problems: recurrent self limited episodes of low back pain in the past  Any previous MRI or X-rays: Yes- at [facility].  Date 2009, 2014  Sees a specialist for back pain:  Brionna in the past and a IVANNA Daniels in past as well  Therapies tried without relief: acetaminophen (Tylenol), cold, heat, massage, muscle relaxants, NSAIDs, Physical Therapy, rest and stretch    Alleviating factors:   Improved by: nothing      Precipitating factors:  Worsened by: Lifting, Bending, Standing, Sitting, Lying Flat, Walking and Coughing    Accompanying Signs & Symptoms:  Risk of Fracture:  Age >64  Risk of Cauda Equina:  None  Risk of Infection:  None  Risk of Cancer:  None  Risk of Ankylosing Spondylitis:  Onset at age <35, male, AND morning back stiffness. no     Patient has been working with physical therapy due to worsening of symptoms since late last year.  After evaluation, therapist noted change in symptoms and recommended repeat imaging.          No Known Allergies      Review of Systems:  Constitutional, HEENT, cardiovascular, pulmonary, gi and gu systems are negative, except as otherwise  noted.      OBJECTIVE:  Vitals:    04/22/22 1114   BP: 116/62   Pulse: 71   Resp: 16   Temp: 97.3  F (36.3  C)   SpO2: 97%               Exam:  GENERAL:: alert and no distress, uncomfortable with changing positions (sitting to standing, etc)  PSYCH: Alert and oriented times 3; speech- coherent , normal rate and volume; able to articulate logical thoughts, able to abstract reason, no tangential thoughts, no hallucinations or delusions, affect- normal      Labs: No results found for this or any previous visit (from the past 24 hour(s)).      ASSESSMENT/PLAN:  1. Chronic bilateral low back pain with right-sided sciatica  I think repeat MRI would be indicated given change in symptoms.  Will submit for approval.  - MR Lumbar Spine w/o Contrast; Future    2. Sciatica of right side  - MR Lumbar Spine w/o Contrast; Future      Carolyn Ritter MD

## 2022-04-26 ENCOUNTER — HOSPITAL ENCOUNTER (OUTPATIENT)
Dept: PHYSICAL THERAPY | Facility: OTHER | Age: 67
Discharge: HOME | End: 2022-04-26
Attending: FAMILY MEDICINE
Payer: OTHER MISCELLANEOUS

## 2022-04-26 PROCEDURE — 97035 APP MDLTY 1+ULTRASOUND EA 15: CPT | Mod: GP

## 2022-04-26 PROCEDURE — 97140 MANUAL THERAPY 1/> REGIONS: CPT | Mod: GP

## 2022-04-28 ENCOUNTER — NURSE TRIAGE (OUTPATIENT)
Dept: FAMILY MEDICINE | Facility: OTHER | Age: 67
End: 2022-04-28
Payer: COMMERCIAL

## 2022-04-28 NOTE — TELEPHONE ENCOUNTER
Patient calls reporting chronic back pain, with worsening symptoms to include: SI joint pain, worsening muscle spasms, right leg is getting weaker- giving out on patient at times.  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 Patient is currently receiving PT for symptoms, which helps with the symptoms.     Patient also reporting dx of osteoporosis, patient mainly inquiring if the osteoporosis is due to limited amount of exercise resulting back to her work-comp injury.     Inquiring if Dr. Marx can review and provide an update to patient.     Patient requesting a return call at [phone]

## 2022-04-28 NOTE — TELEPHONE ENCOUNTER
Provider Response to 2nd Level Triage Request    I have reviewed the RN documentation. My recommendation is:  see below     It is unlikely that patient's osteoporosis is related to her decreased activity.

## 2022-04-28 NOTE — TELEPHONE ENCOUNTER
Call returned to patient notified it is unlikely patient's osteoporosis is related to her decreased activity per Dr. Marx.     Patient verbalized understanding.

## 2022-04-29 ENCOUNTER — HOSPITAL ENCOUNTER (OUTPATIENT)
Dept: PHYSICAL THERAPY | Facility: OTHER | Age: 67
Discharge: HOME | End: 2022-04-29
Attending: FAMILY MEDICINE
Payer: OTHER MISCELLANEOUS

## 2022-04-29 PROCEDURE — 97035 APP MDLTY 1+ULTRASOUND EA 15: CPT | Mod: GP

## 2022-04-29 PROCEDURE — 97140 MANUAL THERAPY 1/> REGIONS: CPT | Mod: GP

## 2022-05-02 ENCOUNTER — ANCILLARY PROCEDURE (OUTPATIENT)
Dept: MAMMOGRAPHY | Facility: OTHER | Age: 67
End: 2022-05-02
Attending: FAMILY MEDICINE
Payer: MEDICARE

## 2022-05-02 ENCOUNTER — TELEPHONE (OUTPATIENT)
Dept: GENERAL RADIOLOGY | Facility: HOSPITAL | Age: 67
End: 2022-05-02
Payer: COMMERCIAL

## 2022-05-02 DIAGNOSIS — Z12.31 VISIT FOR SCREENING MAMMOGRAM: ICD-10-CM

## 2022-05-02 PROCEDURE — 77067 SCR MAMMO BI INCL CAD: CPT | Mod: TC

## 2022-05-03 ENCOUNTER — HOSPITAL ENCOUNTER (OUTPATIENT)
Dept: PHYSICAL THERAPY | Facility: OTHER | Age: 67
Discharge: HOME | End: 2022-05-03
Attending: FAMILY MEDICINE
Payer: OTHER MISCELLANEOUS

## 2022-05-03 PROCEDURE — 97035 APP MDLTY 1+ULTRASOUND EA 15: CPT | Mod: GP

## 2022-05-03 PROCEDURE — 97140 MANUAL THERAPY 1/> REGIONS: CPT | Mod: GP

## 2022-05-06 ENCOUNTER — HOSPITAL ENCOUNTER (OUTPATIENT)
Dept: PHYSICAL THERAPY | Facility: OTHER | Age: 67
Discharge: HOME | End: 2022-05-06
Attending: FAMILY MEDICINE
Payer: OTHER MISCELLANEOUS

## 2022-05-06 PROCEDURE — 97140 MANUAL THERAPY 1/> REGIONS: CPT | Mod: GP

## 2022-05-06 PROCEDURE — 97035 APP MDLTY 1+ULTRASOUND EA 15: CPT | Mod: GP

## 2022-05-09 ENCOUNTER — TELEPHONE (OUTPATIENT)
Dept: FAMILY MEDICINE | Facility: OTHER | Age: 67
End: 2022-05-09
Payer: COMMERCIAL

## 2022-05-09 NOTE — TELEPHONE ENCOUNTER
2:47 PM    Reason for Call: Phone Call    Description: Lisa needs Dr Ritter order of the MR Lumbar Spine fax to [employer] work comp company in order to get approval of the MR Lumbar Spine. They received Dr Ritter office notes but [employer] needs the a MRI the order.     Phone [phone]   Fax [phone]     Preferred method for responding to this message: Telephone Call  What is your phone number ? [phone]    If we cannot reach you directly, may we leave a detailed response at the number you provided? Yes    Can this message wait until your PCP/provider returns, if available today? YES, No    Kennedi Galindo

## 2022-05-10 ENCOUNTER — HOSPITAL ENCOUNTER (OUTPATIENT)
Dept: PHYSICAL THERAPY | Facility: OTHER | Age: 67
Discharge: HOME | End: 2022-05-10
Attending: FAMILY MEDICINE
Payer: OTHER MISCELLANEOUS

## 2022-05-10 PROCEDURE — 97140 MANUAL THERAPY 1/> REGIONS: CPT | Mod: GP

## 2022-05-13 ENCOUNTER — HOSPITAL ENCOUNTER (OUTPATIENT)
Dept: PHYSICAL THERAPY | Facility: OTHER | Age: 67
Discharge: HOME | End: 2022-05-13
Attending: FAMILY MEDICINE
Payer: OTHER MISCELLANEOUS

## 2022-05-13 PROCEDURE — 97140 MANUAL THERAPY 1/> REGIONS: CPT | Mod: GP

## 2022-05-14 ENCOUNTER — HEALTH MAINTENANCE LETTER (OUTPATIENT)
Age: 67
End: 2022-05-14

## 2022-05-25 ENCOUNTER — HOSPITAL ENCOUNTER (OUTPATIENT)
Dept: MRI IMAGING | Facility: HOSPITAL | Age: 67
Discharge: HOME | End: 2022-05-25
Attending: FAMILY MEDICINE | Admitting: FAMILY MEDICINE
Payer: OTHER MISCELLANEOUS

## 2022-05-25 DIAGNOSIS — M54.31 SCIATICA OF RIGHT SIDE: ICD-10-CM

## 2022-05-25 DIAGNOSIS — M54.41 CHRONIC BILATERAL LOW BACK PAIN WITH RIGHT-SIDED SCIATICA: ICD-10-CM

## 2022-05-25 DIAGNOSIS — G89.29 CHRONIC BILATERAL LOW BACK PAIN WITH RIGHT-SIDED SCIATICA: ICD-10-CM

## 2022-05-25 PROCEDURE — 72148 MRI LUMBAR SPINE W/O DYE: CPT

## 2022-05-31 ENCOUNTER — TELEPHONE (OUTPATIENT)
Dept: FAMILY MEDICINE | Facility: OTHER | Age: 67
End: 2022-05-31
Payer: COMMERCIAL

## 2022-05-31 DIAGNOSIS — G89.29 CHRONIC BILATERAL LOW BACK PAIN WITH RIGHT-SIDED SCIATICA: Primary | ICD-10-CM

## 2022-05-31 DIAGNOSIS — M54.31 SCIATICA OF RIGHT SIDE: ICD-10-CM

## 2022-05-31 DIAGNOSIS — M54.41 CHRONIC BILATERAL LOW BACK PAIN WITH RIGHT-SIDED SCIATICA: Primary | ICD-10-CM

## 2022-05-31 NOTE — TELEPHONE ENCOUNTER
Patient is wondering if she should discontinue her premain and progesterone. She reports taking them twice a week for about twenty years. She will not reorder them if they are not necessary.

## 2022-05-31 NOTE — TELEPHONE ENCOUNTER
5/31/2022 3:24 PM  Pt called triage insists on being seen this week for chronic pain. Pt scheduled as she requested.

## 2022-05-31 NOTE — TELEPHONE ENCOUNTER
If she were to just discontinue charlene, she will have breakthrough menopausal symptoms, which would be fine, but can be avoidable.  She can go to once weekly for about a month, then once every other week for about a month then stop and that would minimize symptoms.

## 2022-06-08 ENCOUNTER — TELEPHONE (OUTPATIENT)
Dept: FAMILY MEDICINE | Facility: OTHER | Age: 67
End: 2022-06-08
Payer: COMMERCIAL

## 2022-06-08 NOTE — TELEPHONE ENCOUNTER
Patient calling and would like to know if referral for steroid injections was submitted to [facility] for approval due to this being work comp. This writer sees that the referral was placed and unsure if it was approved. This writer transferred patient to radiology.    Patient's phone number is [phone]

## 2022-06-08 NOTE — TELEPHONE ENCOUNTER
Spoke with patient and advised her we did send out request to Shreya chew [facility] but have not heard back. Patient asked that we send it to [facility] instead. Sent request off and updated patient's chart to have these requests sent to Charlene from now on per patient request.

## 2022-06-10 ENCOUNTER — HOSPITAL ENCOUNTER (OUTPATIENT)
Dept: INTERVENTIONAL RADIOLOGY/VASCULAR | Facility: HOSPITAL | Age: 67
Discharge: HOME | End: 2022-06-10
Attending: FAMILY MEDICINE
Payer: OTHER MISCELLANEOUS

## 2022-06-10 DIAGNOSIS — M54.31 SCIATICA OF RIGHT SIDE: ICD-10-CM

## 2022-06-10 DIAGNOSIS — M54.41 CHRONIC BILATERAL LOW BACK PAIN WITH RIGHT-SIDED SCIATICA: ICD-10-CM

## 2022-06-10 DIAGNOSIS — G89.29 CHRONIC BILATERAL LOW BACK PAIN WITH RIGHT-SIDED SCIATICA: ICD-10-CM

## 2022-06-10 PROCEDURE — G0463 HOSPITAL OUTPT CLINIC VISIT: HCPCS

## 2022-06-17 ENCOUNTER — NURSE TRIAGE (OUTPATIENT)
Dept: FAMILY MEDICINE | Facility: OTHER | Age: 67
End: 2022-06-17
Payer: COMMERCIAL

## 2022-06-17 NOTE — TELEPHONE ENCOUNTER
"6/17/2022 10:04 AM  Pt called reports over past month phlegm in back of throat causes nausea. Pt has no other sx or concerns. Denies needing immediate medical attention. Pt stated \"I feel great\". Pt scheduled Monday.   "

## 2022-06-20 ENCOUNTER — OFFICE VISIT (OUTPATIENT)
Dept: FAMILY MEDICINE | Facility: OTHER | Age: 67
End: 2022-06-20
Attending: FAMILY MEDICINE
Payer: MEDICARE

## 2022-06-20 VITALS
DIASTOLIC BLOOD PRESSURE: 66 MMHG | HEIGHT: 62 IN | OXYGEN SATURATION: 98 % | SYSTOLIC BLOOD PRESSURE: 126 MMHG | WEIGHT: 154.8 LBS | TEMPERATURE: 97.9 F | HEART RATE: 71 BPM | BODY MASS INDEX: 28.49 KG/M2 | RESPIRATION RATE: 16 BRPM

## 2022-06-20 DIAGNOSIS — D64.9 ANEMIA, UNSPECIFIED TYPE: Primary | ICD-10-CM

## 2022-06-20 DIAGNOSIS — Z23 NEED FOR VACCINATION: ICD-10-CM

## 2022-06-20 DIAGNOSIS — R11.11 VOMITING WITHOUT NAUSEA, INTRACTABILITY OF VOMITING NOT SPECIFIED, UNSPECIFIED VOMITING TYPE: Primary | ICD-10-CM

## 2022-06-20 LAB
ALBUMIN SERPL-MCNC: 3.5 G/DL (ref 3.4–5)
ALP SERPL-CCNC: 71 U/L (ref 40–150)
ALT SERPL W P-5'-P-CCNC: 24 U/L (ref 0–50)
AMYLASE SERPL-CCNC: 92 U/L (ref 30–110)
ANION GAP SERPL CALCULATED.3IONS-SCNC: 7 MMOL/L (ref 3–14)
AST SERPL W P-5'-P-CCNC: 18 U/L (ref 0–45)
BILIRUB SERPL-MCNC: 0.4 MG/DL (ref 0.2–1.3)
BUN SERPL-MCNC: 19 MG/DL (ref 7–30)
CALCIUM SERPL-MCNC: 9.6 MG/DL (ref 8.5–10.1)
CHLORIDE BLD-SCNC: 104 MMOL/L (ref 94–109)
CO2 SERPL-SCNC: 28 MMOL/L (ref 20–32)
CREAT SERPL-MCNC: 0.67 MG/DL (ref 0.52–1.04)
ERYTHROCYTE [DISTWIDTH] IN BLOOD BY AUTOMATED COUNT: 14.9 % (ref 10–15)
GFR SERPL CREATININE-BSD FRML MDRD: >90 ML/MIN/1.73M2
GLUCOSE BLD-MCNC: 92 MG/DL (ref 70–99)
HCT VFR BLD AUTO: 31.1 % (ref 35–47)
HGB BLD-MCNC: 10.1 G/DL (ref 11.7–15.7)
LIPASE SERPL-CCNC: 287 U/L (ref 73–393)
MCH RBC QN AUTO: 28.1 PG (ref 26.5–33)
MCHC RBC AUTO-ENTMCNC: 32.5 G/DL (ref 31.5–36.5)
MCV RBC AUTO: 87 FL (ref 78–100)
PLATELET # BLD AUTO: 391 10E3/UL (ref 150–450)
POTASSIUM BLD-SCNC: 3.5 MMOL/L (ref 3.4–5.3)
PROT SERPL-MCNC: 7 G/DL (ref 6.8–8.8)
RBC # BLD AUTO: 3.59 10E6/UL (ref 3.8–5.2)
SODIUM SERPL-SCNC: 139 MMOL/L (ref 133–144)
WBC # BLD AUTO: 6.2 10E3/UL (ref 4–11)

## 2022-06-20 PROCEDURE — 85027 COMPLETE CBC AUTOMATED: CPT | Mod: ZL | Performed by: FAMILY MEDICINE

## 2022-06-20 PROCEDURE — 99214 OFFICE O/P EST MOD 30 MIN: CPT | Performed by: FAMILY MEDICINE

## 2022-06-20 PROCEDURE — 83690 ASSAY OF LIPASE: CPT | Mod: ZL | Performed by: FAMILY MEDICINE

## 2022-06-20 PROCEDURE — 80053 COMPREHEN METABOLIC PANEL: CPT | Mod: ZL | Performed by: FAMILY MEDICINE

## 2022-06-20 PROCEDURE — 36415 COLL VENOUS BLD VENIPUNCTURE: CPT | Mod: ZL | Performed by: FAMILY MEDICINE

## 2022-06-20 PROCEDURE — 90732 PPSV23 VACC 2 YRS+ SUBQ/IM: CPT

## 2022-06-20 PROCEDURE — G0463 HOSPITAL OUTPT CLINIC VISIT: HCPCS

## 2022-06-20 PROCEDURE — G0463 HOSPITAL OUTPT CLINIC VISIT: HCPCS | Mod: 25

## 2022-06-20 PROCEDURE — 82150 ASSAY OF AMYLASE: CPT | Mod: ZL | Performed by: FAMILY MEDICINE

## 2022-06-20 RX ORDER — PANTOPRAZOLE SODIUM 40 MG/1
40 TABLET, DELAYED RELEASE ORAL DAILY
Qty: 30 TABLET | Refills: 2 | Status: SHIPPED | OUTPATIENT
Start: 2022-06-20 | End: 2022-08-24

## 2022-06-20 ASSESSMENT — PAIN SCALES - GENERAL: PAINLEVEL: MILD PAIN (3)

## 2022-06-20 NOTE — PROGRESS NOTES
"  Assessment & Plan     1. Vomiting without nausea, intractability of vomiting not specified, unspecified vomiting type  Does not necessarily sounds like mucus from allergies, I would wonder about possible reflux.  Will check labs as listed, and will start Protonix or similar.  Follow-up with results when available, patient is asked to return to clinic in about 2 weeks for review of symptoms as well.  - CBC with platelets; Future  - Comprehensive metabolic panel; Future  - Lipase; Future  - Amylase; Future  - pantoprazole (PROTONIX) 40 MG EC tablet; Take 1 tablet (40 mg) by mouth daily  Dispense: 30 tablet; Refill: 2  - Amylase  - Lipase  - Comprehensive metabolic panel  - CBC with platelets    2. Need for vaccination  Updated.  - PPSV23, IM/SUBQ (2+ YRS) - [id number]       BMI:   Estimated body mass index is 28.31 kg/m  as calculated from the following:    Height as of this encounter: 1.575 m (5' 2\").    Weight as of this encounter: 70.2 kg (154 lb 12.8 oz).     Return in about 2 weeks (around 7/4/2022) for Medication review.    Carolyn Ritter MD  [facility] - [place] RENEA Gonzalez is a 67 year old presenting for the following health issues:  Nasal Congestion      HPI     Concern - sputum/phlegm   Onset: a month or so  Description: phlegm  Intensity: mild  Progression of Symptoms:  worsening  Accompanying Signs & Symptoms: phlegm is coming out almost like vomitting  Previous history of similar problem: none  Precipitating factors:        Worsened by: none  Alleviating factors:        Improved by: none  Therapies tried and outcome:  none , but sometimes hard candy helps      Review of Systems   Constitutional, HEENT, cardiovascular, pulmonary, gi and gu systems are negative, except as otherwise noted.      Objective    /66 (BP Location: Right arm, Patient Position: Sitting, Cuff Size: Adult Regular)   Pulse 71   Temp 97.9  F (36.6  C) (Tympanic)   Resp 16   Ht 1.575 m (5' 2\")  "  Wt 70.2 kg (154 lb 12.8 oz)   SpO2 98%   BMI 28.31 kg/m    Body mass index is 28.31 kg/m .  Physical Exam   GENERAL: healthy, alert and no distress  PSYCH: mentation appears normal, affect normal/bright                .  ..

## 2022-06-20 NOTE — NURSING NOTE
"Chief Complaint   Patient presents with     Nasal Congestion       Initial /66 (BP Location: Right arm, Patient Position: Sitting, Cuff Size: Adult Regular)   Pulse 71   Temp 97.9  F (36.6  C) (Tympanic)   Resp 16   Ht 1.575 m (5' 2\")   Wt 70.2 kg (154 lb 12.8 oz)   SpO2 98%   BMI 28.31 kg/m   Estimated body mass index is 28.31 kg/m  as calculated from the following:    Height as of this encounter: 1.575 m (5' 2\").    Weight as of this encounter: 70.2 kg (154 lb 12.8 oz).  Medication Reconciliation: complete  Jeni Byrne MA  "

## 2022-06-21 ENCOUNTER — LAB (OUTPATIENT)
Dept: LAB | Facility: OTHER | Age: 67
End: 2022-06-21
Payer: MEDICARE

## 2022-06-21 DIAGNOSIS — D64.9 ANEMIA, UNSPECIFIED TYPE: ICD-10-CM

## 2022-06-21 LAB
BASOPHILS # BLD AUTO: 0 10E3/UL (ref 0–0.2)
BASOPHILS NFR BLD AUTO: 1 %
EOSINOPHIL # BLD AUTO: 0.2 10E3/UL (ref 0–0.7)
EOSINOPHIL NFR BLD AUTO: 3 %
ERYTHROCYTE [DISTWIDTH] IN BLOOD BY AUTOMATED COUNT: 15 % (ref 10–15)
FERRITIN SERPL-MCNC: 4 NG/ML (ref 8–252)
HCT VFR BLD AUTO: 31.1 % (ref 35–47)
HGB BLD-MCNC: 10 G/DL (ref 11.7–15.7)
IRON SATN MFR SERPL: 6 % (ref 15–46)
IRON SERPL-MCNC: 30 UG/DL (ref 35–180)
LYMPHOCYTES # BLD AUTO: 1.8 10E3/UL (ref 0.8–5.3)
LYMPHOCYTES NFR BLD AUTO: 22 %
MCH RBC QN AUTO: 27.8 PG (ref 26.5–33)
MCHC RBC AUTO-ENTMCNC: 32.2 G/DL (ref 31.5–36.5)
MCV RBC AUTO: 86 FL (ref 78–100)
MONOCYTES # BLD AUTO: 0.9 10E3/UL (ref 0–1.3)
MONOCYTES NFR BLD AUTO: 12 %
NEUTROPHILS # BLD AUTO: 5 10E3/UL (ref 1.6–8.3)
NEUTROPHILS NFR BLD AUTO: 63 %
PLATELET # BLD AUTO: 411 10E3/UL (ref 150–450)
RBC # BLD AUTO: 3.6 10E6/UL (ref 3.8–5.2)
RETICS # AUTO: 0.07 10E6/UL (ref 0.03–0.1)
RETICS/RBC NFR AUTO: 2 % (ref 0.5–2)
TIBC SERPL-MCNC: 512 UG/DL (ref 240–430)
WBC # BLD AUTO: 7.9 10E3/UL (ref 4–11)

## 2022-06-21 PROCEDURE — 82728 ASSAY OF FERRITIN: CPT | Mod: ZL

## 2022-06-21 PROCEDURE — 85025 COMPLETE CBC W/AUTO DIFF WBC: CPT | Mod: ZL

## 2022-06-21 PROCEDURE — 82607 VITAMIN B-12: CPT | Mod: ZL

## 2022-06-21 PROCEDURE — 82746 ASSAY OF FOLIC ACID SERUM: CPT | Mod: ZL

## 2022-06-21 PROCEDURE — 83550 IRON BINDING TEST: CPT | Mod: ZL

## 2022-06-21 PROCEDURE — 88313 SPECIAL STAINS GROUP 2: CPT | Mod: TC

## 2022-06-21 PROCEDURE — 36415 COLL VENOUS BLD VENIPUNCTURE: CPT | Mod: ZL

## 2022-06-21 PROCEDURE — 85045 AUTOMATED RETICULOCYTE COUNT: CPT | Mod: ZL

## 2022-06-22 ENCOUNTER — LAB (OUTPATIENT)
Dept: LAB | Facility: OTHER | Age: 67
End: 2022-06-22
Payer: MEDICARE

## 2022-06-22 DIAGNOSIS — D64.9 ANEMIA, UNSPECIFIED TYPE: Primary | ICD-10-CM

## 2022-06-22 LAB
FOLATE SERPL-MCNC: 7.3 NG/ML (ref 4.6–34.8)
HEMOCCULT SP1 STL QL: NEGATIVE
VIT B12 SERPL-MCNC: 306 PG/ML (ref 232–1245)

## 2022-06-22 PROCEDURE — 82274 ASSAY TEST FOR BLOOD FECAL: CPT | Mod: ZL

## 2022-06-24 LAB
PATH REPORT.ADDENDUM SPEC: NORMAL
PATH REPORT.COMMENTS IMP SPEC: NORMAL
PATH REPORT.FINAL DX SPEC: NORMAL
PATH REPORT.MICROSCOPIC SPEC OTHER STN: NORMAL
PATH REPORT.MICROSCOPIC SPEC OTHER STN: NORMAL
PATH REPORT.RELEVANT HX SPEC: NORMAL

## 2022-06-24 PROCEDURE — 85060 BLOOD SMEAR INTERPRETATION: CPT | Performed by: PATHOLOGY

## 2022-06-29 DIAGNOSIS — D50.9 IRON DEFICIENCY ANEMIA, UNSPECIFIED IRON DEFICIENCY ANEMIA TYPE: Primary | ICD-10-CM

## 2022-06-29 RX ORDER — FERROUS SULFATE 325(65) MG
325 TABLET ORAL EVERY OTHER DAY
Qty: 180 TABLET | Refills: 0 | Status: SHIPPED | OUTPATIENT
Start: 2022-06-29 | End: 2023-06-24

## 2022-06-29 NOTE — PROGRESS NOTES
[facility]    Outpatient Physical Therapy Discharge Note  Patient: Lisa Angeles  : 1955    Beginning/End Dates of Reporting Period:  22 to 22    Referring Provider: Dr. Ritter    Therapy Diagnosis: Chronic B LBP with R sciatica       Client Self Report: Pt reports they are working on getting the MRI approval. SHe notes pain today dang along the R lower gluteal medial to the ishial tuberosity.    Objective Measurements:  Objective Measure: Special tests  Details: Negative SI joint dysfunction, negative resisted knee flexion/hamstring test, mild pain with resisted hip internal rotation, no pain with resisted hip external rotation  Objective Measure: Palpation  Details: Tenderness to palpation along the right piriformis/gluteal muscles.  Tenderness also along the region just medial to the ischial tuberosity                              Outcome Measures (most recent score):      Goals:  Goal Identifier STG 1   Goal Description Decrease pain when at its worst to a 7/10   Target Date 22   Date Met  22   Progress (detail required for progress note):       Goal Identifier LTG 1   Goal Description Patient demonstrate independence with home exercise program   Target Date 22   Date Met  22   Progress (detail required for progress note):       Goal Identifier LTG 2   Goal Description Patient is able to walk 1/4-1+ miles without significant increased back or leg pain 75% of the time   Target Date 22   Date Met      Progress (detail required for progress note): not met     Goal Identifier     Goal Description     Target Date     Date Met      Progress (detail required for progress note):       Goal Identifier     Goal Description     Target Date     Date Met      Progress (detail required for progress note):       Goal Identifier     Goal Description     Target Date      Date Met      Progress (detail required for progress note):       Goal Identifier     Goal Description     Target Date     Date Met      Progress (detail required for progress note):       Goal Identifier     Goal Description     Target Date     Date Met      Progress (detail required for progress note):             Plan:  Discharge from therapy.    Discharge:    Reason for Discharge: pt was going to have MRI and follow-up with her PCP    Equipment Issued: none    Discharge Plan: Patient to continue home program.

## 2022-06-29 NOTE — CONFIDENTIAL NOTE
Iron supplement sent in. Take apart from calcium, caffeine, antacids, and an medications for stomach (protonix).

## 2022-06-30 NOTE — PROGRESS NOTES
"  Assessment & Plan     1. Hyperlipidemia, unspecified hyperlipidemia type  Labs updated, will make adjustments as needed.  Follow-up in six months, sooner as needed.  - Lipid Profile (Chol, Trig, HDL, LDL calc); Future  - Lipid Profile (Chol, Trig, HDL, LDL calc)    2. Benign essential hypertension  As above.    3. Increased sputum production  Will refer to ENT for further evaluation.  - Adult ENT  Referral; Future    4. Iron deficiency anemia, unspecified iron deficiency anemia type  Updated.  - Hemoglobin; Future  - Hemoglobin       BMI:   Estimated body mass index is 28.26 kg/m  as calculated from the following:    Height as of this encounter: 1.575 m (5' 2\").    Weight as of this encounter: 70.1 kg (154 lb 8 oz).     Return in about 6 months (around 1/7/2023) for Chronic Disease Management, Medication review.    Carolyn Ritter MD  [facility] [place] REN[place] Gonzalez is a 67 year old presenting for the following health issues:  Lipids and Hypertension      HPI     Hyperlipidemia Follow-Up      Are you regularly taking any medication or supplement to lower your cholesterol?   No    Are you having muscle aches or other side effects that you think could be caused by your cholesterol lowering medication?  No    Hypertension Follow-up      Do you check your blood pressure regularly outside of the clinic? No     Are you following a low salt diet? Yes    Are your blood pressures ever more than 140 on the top number (systolic) OR more   than 90 on the bottom number (diastolic), for example 140/90? No        GERD/Heartburn  Onset/Duration: 2 months  Description: coughing up phlegm  Intensity: mild  Progression of Symptoms: same  Accompanying Signs & Symptoms:  Does it feel like food gets stuck or trouble swallowing: No  Nausea: No  Vomiting (bloody?): YES- not bloody  Abdominal Pain: No  Black-Tarry stools: No  Bloody stools: No  History:  Previous similar episodes: No  Previous " "ulcers: No  Precipitating factors:   Caffeine use: YES  Alcohol use: YES  NSAID/Aspirin use: No  Tobacco use: No  Worse with no particular food or drink.  Alleviating factors: None  Therapies tried and outcome:             Lifestyle changes: None            Medications: antacids  Patient very much feels like her issues is increase sputum production.  She can feel it in her upper airways/throat.        Review of Systems   Constitutional, HEENT, cardiovascular, pulmonary, gi and gu systems are negative, except as otherwise noted.      Objective    /68 (BP Location: Left arm, Patient Position: Sitting, Cuff Size: Adult Regular)   Pulse 74   Temp 98  F (36.7  C) (Tympanic)   Resp 16   Ht 1.575 m (5' 2\")   Wt 70.1 kg (154 lb 8 oz)   SpO2 96%   BMI 28.26 kg/m    Body mass index is 28.26 kg/m .  Physical Exam   GENERAL: healthy, alert and no distress  PSYCH: mentation appears normal, affect normal/bright                .  ..  "

## 2022-07-07 ENCOUNTER — OFFICE VISIT (OUTPATIENT)
Dept: FAMILY MEDICINE | Facility: OTHER | Age: 67
End: 2022-07-07
Attending: FAMILY MEDICINE
Payer: COMMERCIAL

## 2022-07-07 ENCOUNTER — TELEPHONE (OUTPATIENT)
Dept: FAMILY MEDICINE | Facility: OTHER | Age: 67
End: 2022-07-07

## 2022-07-07 VITALS
OXYGEN SATURATION: 96 % | HEART RATE: 74 BPM | SYSTOLIC BLOOD PRESSURE: 124 MMHG | WEIGHT: 154.5 LBS | HEIGHT: 62 IN | BODY MASS INDEX: 28.43 KG/M2 | RESPIRATION RATE: 16 BRPM | DIASTOLIC BLOOD PRESSURE: 68 MMHG | TEMPERATURE: 98 F

## 2022-07-07 DIAGNOSIS — I10 BENIGN ESSENTIAL HYPERTENSION: ICD-10-CM

## 2022-07-07 DIAGNOSIS — E78.5 HYPERLIPIDEMIA, UNSPECIFIED HYPERLIPIDEMIA TYPE: Primary | ICD-10-CM

## 2022-07-07 DIAGNOSIS — D50.9 IRON DEFICIENCY ANEMIA, UNSPECIFIED IRON DEFICIENCY ANEMIA TYPE: ICD-10-CM

## 2022-07-07 DIAGNOSIS — R09.3 INCREASED SPUTUM PRODUCTION: ICD-10-CM

## 2022-07-07 LAB
CHOLEST SERPL-MCNC: 230 MG/DL
FASTING STATUS PATIENT QL REPORTED: YES
HDLC SERPL-MCNC: 83 MG/DL
HGB BLD-MCNC: 10.7 G/DL (ref 11.7–15.7)
LDLC SERPL CALC-MCNC: 128 MG/DL
NONHDLC SERPL-MCNC: 147 MG/DL
TRIGL SERPL-MCNC: 95 MG/DL

## 2022-07-07 PROCEDURE — 36415 COLL VENOUS BLD VENIPUNCTURE: CPT | Mod: ZL | Performed by: FAMILY MEDICINE

## 2022-07-07 PROCEDURE — 99214 OFFICE O/P EST MOD 30 MIN: CPT | Performed by: FAMILY MEDICINE

## 2022-07-07 PROCEDURE — 80061 LIPID PANEL: CPT | Mod: ZL | Performed by: FAMILY MEDICINE

## 2022-07-07 PROCEDURE — G0463 HOSPITAL OUTPT CLINIC VISIT: HCPCS

## 2022-07-07 PROCEDURE — 85018 HEMOGLOBIN: CPT | Mod: ZL | Performed by: FAMILY MEDICINE

## 2022-07-07 RX ORDER — L. ACIDOPHILUS/PECTIN, CITRUS 25MM-100MG
1 TABLET ORAL DAILY
COMMUNITY

## 2022-07-07 RX ORDER — IBUPROFEN 800 MG/1
1 TABLET, FILM COATED ORAL
COMMUNITY
Start: 2022-04-01 | End: 2022-07-12

## 2022-07-07 RX ORDER — MULTIVIT-MIN/IRON/FOLIC ACID/K 18-600-40
1000 CAPSULE ORAL DAILY
COMMUNITY

## 2022-07-07 RX ORDER — PANTOPRAZOLE SODIUM 40 MG/1
40 TABLET, DELAYED RELEASE ORAL
COMMUNITY
Start: 2022-06-20 | End: 2022-07-12

## 2022-07-07 ASSESSMENT — PAIN SCALES - GENERAL: PAINLEVEL: MODERATE PAIN (4)

## 2022-07-07 NOTE — TELEPHONE ENCOUNTER
"4:05 PM    Reason for Call: Phone Call    Description: Patient called and states that she is questioning whether the \"whacky\" labs are due to the increased use of pain meds through [facility]. Please call patient for further discussion.     Was an appointment offered for this call? No  If yes : Appointment type              Date    Preferred method for responding to this message: Telephone Call  What is your phone number ? [phone]    If we cannot reach you directly, may we leave a detailed response at the number you provided? Yes    Can this message wait until your PCP/provider returns, if available today? YES, advised call would be likely tomorrow    Isatu Lind    "

## 2022-07-07 NOTE — NURSING NOTE
"Chief Complaint   Patient presents with     Lipids     Hypertension       Initial /68 (BP Location: Left arm, Patient Position: Sitting, Cuff Size: Adult Regular)   Pulse 74   Temp 98  F (36.7  C) (Tympanic)   Resp 16   Ht 1.575 m (5' 2\")   Wt 70.1 kg (154 lb 8 oz)   SpO2 96%   BMI 28.26 kg/m   Estimated body mass index is 28.26 kg/m  as calculated from the following:    Height as of this encounter: 1.575 m (5' 2\").    Weight as of this encounter: 70.1 kg (154 lb 8 oz).  Medication Reconciliation: complete  Jeni Byrne MA  "
Weakness

## 2022-07-12 ENCOUNTER — TELEPHONE (OUTPATIENT)
Dept: OTOLARYNGOLOGY | Facility: OTHER | Age: 67
End: 2022-07-12

## 2022-07-12 ENCOUNTER — OFFICE VISIT (OUTPATIENT)
Dept: OTOLARYNGOLOGY | Facility: OTHER | Age: 67
End: 2022-07-12
Attending: FAMILY MEDICINE
Payer: COMMERCIAL

## 2022-07-12 VITALS
DIASTOLIC BLOOD PRESSURE: 62 MMHG | BODY MASS INDEX: 27.6 KG/M2 | OXYGEN SATURATION: 98 % | TEMPERATURE: 97.8 F | WEIGHT: 150 LBS | HEART RATE: 74 BPM | SYSTOLIC BLOOD PRESSURE: 130 MMHG | HEIGHT: 62 IN

## 2022-07-12 DIAGNOSIS — R09.A2 GLOBUS SENSATION: ICD-10-CM

## 2022-07-12 DIAGNOSIS — J34.3 NASAL TURBINATE HYPERTROPHY: ICD-10-CM

## 2022-07-12 DIAGNOSIS — R09.82 POST-NASAL DRAINAGE: Primary | ICD-10-CM

## 2022-07-12 DIAGNOSIS — K21.00 GASTROESOPHAGEAL REFLUX DISEASE WITH ESOPHAGITIS WITHOUT HEMORRHAGE: ICD-10-CM

## 2022-07-12 DIAGNOSIS — R09.3 INCREASED SPUTUM PRODUCTION: ICD-10-CM

## 2022-07-12 PROCEDURE — G0463 HOSPITAL OUTPT CLINIC VISIT: HCPCS | Mod: 25

## 2022-07-12 PROCEDURE — 31575 DIAGNOSTIC LARYNGOSCOPY: CPT | Performed by: PHYSICIAN ASSISTANT

## 2022-07-12 PROCEDURE — 99214 OFFICE O/P EST MOD 30 MIN: CPT | Mod: 25 | Performed by: PHYSICIAN ASSISTANT

## 2022-07-12 RX ORDER — BUDESONIDE 0.5 MG/2ML
INHALANT ORAL
Qty: 120 ML | Refills: 1 | Status: SHIPPED | OUTPATIENT
Start: 2022-07-12 | End: 2022-08-24

## 2022-07-12 RX ORDER — SODIUM CHLORIDE/SODIUM BICARB
PACKET (EA) NASAL
Qty: 200 EACH | Refills: 11 | Status: SHIPPED | OUTPATIENT
Start: 2022-07-12

## 2022-07-12 RX ORDER — MOMETASONE FUROATE MONOHYDRATE 50 UG/1
2 SPRAY, METERED NASAL DAILY
Qty: 17 G | Refills: 11 | Status: SHIPPED | OUTPATIENT
Start: 2022-07-12 | End: 2022-08-03

## 2022-07-12 RX ORDER — FLUTICASONE PROPIONATE 50 MCG
2 SPRAY, SUSPENSION (ML) NASAL DAILY
Qty: 16 G | Refills: 11 | Status: SHIPPED | OUTPATIENT
Start: 2022-07-12

## 2022-07-12 RX ORDER — FAMOTIDINE 40 MG/1
40 TABLET, FILM COATED ORAL AT BEDTIME
Qty: 30 TABLET | Refills: 1 | Status: SHIPPED | OUTPATIENT
Start: 2022-07-12 | End: 2022-08-24

## 2022-07-12 ASSESSMENT — PAIN SCALES - GENERAL: PAINLEVEL: MODERATE PAIN (4)

## 2022-07-12 NOTE — TELEPHONE ENCOUNTER
Central Prior Authorization Team   Phone: [phone]      PRIOR AUTHORIZATION DENIED    Medication: mometasone (NASONEX) 50 MCG/ACT nasal spray - EPA DENIED     Denial Date: 7/12/2022    Denial Rational:   Case ID: [id number] Appeal supported: No   Note from payer: You asked for the drug listed above for your Hypertrophy of nasal turbinates, postnasal drip and Increased sputum production. [employer] follows Medicare rules. The Medicare rule in the Prescription Drug Manual (Chapter 6, Section 10.6) says an off-label use of a drug is a use that is not included on the drugs label as approved by the U.S. Food and Drug Administration (FDA). FDA approved drugs used for a disease other than what is on the official label may be covered under Medicare if [employer] determines the use to be medically accepted. [employer] makes these decisions on a case-by-case basis. We take into consideration the two major drug compendia. These compendia (or drug guides) are called the DRUGDEX Information System and the [facility] Drug Information (AHFS-DI). Off-label use is medically accepted when there is evidence in one or more of the compendia that it works for the disease in question. [employer] has determined that the off-label use of this drug for your condition is not medically accepted per Medicare rules and isn&apos;t covered based on available information.    Appeal Information: If the Provider/Patient would like to appeal this denial, please provide a letter of medical necessity explaining why Preferred Formulary medications are not appropriate in the treatment of the patient's condition; along with any previous therapies tried/failed.    Once completed, please route back to me directly: Akosua Pardo

## 2022-07-12 NOTE — PROGRESS NOTES
Chief Complaint   Patient presents with     Consult     Referred by Dr. Ocampo for increase sputum production.  No reflux.  She will get a weird taste in her mouth and this will come.  This started 2 months ago.     History of Present Illness - Lisa Angeles is a very pleasant 67 year old female  Patient presents to ENT for concerns of increase in phlegm secretions. Lisa was seen on 7/7/22 and noted throat concerns stating she had excess secretions to expel.    Today, Lisa presents for the above stated concerns and has ongoing concerns. She had felt sensation of something/ phlegm that causes a gagging sensation. She reports it occurs randomly and she must go to the bathroom to clear. She does have an associate bad taste in her mouth-bitter/ metallic- notes it occurs several times during these episodes.       Patient denies sore throat or throat pain  Denies chronic otalgia.   Denies fevers, night sweats or weight loss.     Denies dysphagia or dysphonia.   Denies globus sensation.     Water- Adequate   Caffeine- 2 cups.   ETOH- Social  Tobacco- No. Quit 26 years ago.   Reflux- No symptoms.     Hx of sinus surgery- several years ago.  No complaints of sinuses since surgery.   No PND, rhinitis  No seasonal allergies.         Past Medical History -   Patient Active Problem List   Diagnosis     Low back pain     Sciatica     Advance Care Planning     Hyperlipidemia     Basal cell carcinoma of eyebrow     Benign essential hypertension       Current Medications -   Current Outpatient Medications:      Ascorbic Acid (VITAMIN C) 500 MG CAPS, , Disp: , Rfl:      B Complex-C CAPS, Take 1 capsule by mouth daily, Disp: 90 capsule, Rfl: 3     calcium carbonate (CALCIUM ANTACID) 500 MG chewable tablet, Take 1 tablet (500 mg) by mouth 2 times daily, Disp: 180 tablet, Rfl: 3     citalopram (CELEXA) 20 MG tablet, TAKE ONE (1) TABLET BY MOUTH EVERY DAY, Disp: 30 tablet, Rfl: 3     diazepam (VALIUM) 5 MG tablet, TAKE 1 TO 2  TABLETS BY MOUTH EVERY 6 HOURS AS NEEDED FOR MUSCLE SPASM, Disp: 40 tablet, Rfl: 0     ferrous sulfate (FEROSUL) 325 (65 Fe) MG tablet, Take 1 tablet (325 mg) by mouth every other day for 360 days, Disp: 180 tablet, Rfl: 0     fish oil-omega-3 fatty acids 1000 MG capsule, Take 3 capsules (3 g) by mouth daily, Disp: 270 capsule, Rfl: 3     hydrochlorothiazide (HYDRODIURIL) 25 MG tablet, TAKE ONE (1) TABLET (25 MG) BY MOUTH DAILY, Disp: 90 tablet, Rfl: 3     HYDROcodone-acetaminophen (NORCO) 7.5-325 MG per tablet, Take 1 tablet by mouth every 6 hours as needed for moderate to severe pain, Disp: 60 tablet, Rfl: 0     IBANdronate (BONIVA) 150 MG tablet, Take 1 tablet (150 mg) by mouth every 30 days, Disp: 3 tablet, Rfl: 3     ibuprofen (ADVIL/MOTRIN) 800 MG tablet, Take 1 tablet by mouth, Disp: , Rfl:      ibuprofen (ADVIL/MOTRIN) 800 MG tablet, TAKE 1 TABLET BY MOUTH EVERY 8 HOURS AS NEEDED, Disp: 90 tablet, Rfl: 2     Lactobacillus Acid-Pectin (LACTOBACILLUS ACIDOPHILUS) TABS, Take 1 tablet by mouth 3 times daily (before meals), Disp: , Rfl:      order for DME, Equipment being ordered: Automatic Blood Pressure Cuff, Disp: 1 Device, Rfl: 0     order for DME, Equipment being ordered: gamekeeper/thumb spica splint, Disp: 1 Device, Rfl: 0     pantoprazole (PROTONIX) 40 MG EC tablet, Take 40 mg by mouth, Disp: , Rfl:      pantoprazole (PROTONIX) 40 MG EC tablet, Take 1 tablet (40 mg) by mouth daily, Disp: 30 tablet, Rfl: 2     PREMARIN 0.3 MG tablet, TAKE ONE (1) TABLET (0.3 MG) BY MOUTH TWICE A WEEK (Patient taking differently: Once every other week.), Disp: 24 tablet, Rfl: 1     progesterone (PROMETRIUM) 100 MG capsule, TAKE ONE (1) CAPSULE (100 MG) BY MOUTH TWICE A WEEK (Patient taking differently: Once every other week.), Disp: 24 capsule, Rfl: 1     triamcinolone (KENALOG) 0.1 % external cream, Apply at bedtime for itching on arms and legs, Disp: 80 g, Rfl: 3     Vitamin D3 (VITAMIN D3) 25 mcg (1000 units) tablet,  "Take 1 tablet (25 mcg) by mouth daily, Disp: 90 tablet, Rfl: 3    Allergies - No Known Allergies    Social History -   Social History     Socioeconomic History     Marital status:    Tobacco Use     Smoking status: Former Smoker     Types: Cigarettes     Quit date: 1993     Years since quittin.9     Smokeless tobacco: Never Used     Tobacco comment: year quit: , no passive exposure   Substance and Sexual Activity     Alcohol use: Yes     Comment: socially-weekends     Drug use: No     Sexual activity: Not Currently     Partners: Male   Other Topics Concern     Blood Transfusions Yes     Caffeine Concern Yes     Comment: coffee - 3 cups daily     Exercise Yes     Comment: walking, stretches     Parent/sibling w/ CABG, MI or angioplasty before 65F 55M? No       Family History -   Family History   Problem Relation Age of Onset     Cancer Mother 59        ovarian/uterine     Cancer Father 72        brain     Ovarian Cancer Maternal Aunt      Cancer Maternal Aunt         ovarian     Cancer Maternal Aunt         uterine     Cancer Maternal Uncle         lung     Cancer Maternal Uncle         throat     Breast Cancer Other 35        cousin     Cancer Other         lung cancer       Review of Systems - As per HPI and PMHx, otherwise 10+ system review of the head and neck, and general constitution is negative.    Physical Exam  /62 (BP Location: Right arm, Cuff Size: Adult Regular)   Pulse 74   Temp 97.8  F (36.6  C) (Tympanic)   Ht 1.575 m (5' 2\")   Wt 68 kg (150 lb)   SpO2 98%   BMI 27.44 kg/m      General - The patient is well nourished and well developed, and appears to have good nutritional status.  Alert and oriented to person and place, answers questions and cooperates with examination appropriately.   Head and Face - Normocephalic and atraumatic, with no gross asymmetry noted of the contour of the facial features.  The facial nerve is intact, with strong symmetric movements.  Voice and " Breathing - The patient was breathing comfortably without the use of accessory muscles. There was no wheezing, stridor, or stertor.  The patients voice was clear and strong, and had appropriate pitch and quality.  Ears - The tympanic membranes are normal in appearance, bony landmarks are intact.  No retraction, perforation, or masses.  No fluid or purulence was seen in the external canal or the middle ear. No evidence of infection of the middle ear or external canal, cerumen was normal in appearance.  Eyes - Extraocular movements intact, and the pupils were reactive to light.  Sclera were not icteric or injected, conjunctiva were pink and moist.  Mouth - Examination of the oral cavity showed pink, healthy oral mucosa. No lesions or ulcerations noted.  The tongue was mobile and midline, and the dentition were in good condition.    Throat - The walls of the oropharynx were smooth, pink, moist, symmetric, and had no lesions or ulcerations.  The tonsillar pillars and soft palate were symmetric.  The uvula was midline on elevation.    Neck - Normal midline excursion of the laryngotracheal complex during swallowing.  Full range of motion on passive movement.  Palpation of the occipital, submental, submandibular, internal jugular chain, and supraclavicular nodes did not demonstrate any abnormal lymph nodes or masses.  The carotid pulse was palpable bilaterally.  Palpation of the thyroid was soft and smooth, with no nodules or goiter appreciated.  The trachea was mobile and midline.  Nose - External contour is symmetric, no gross deflection or scars.  Nasal mucosa is pink and moist with no abnormal mucus.  The septum was midline and non-obstructive, turbinates- edematous.       Flexible Endoscopy -    Attempts at mirror laryngoscopy were not possible due to gag reflex.  Therefore I proceeded with a fiberoptic examination.  First I sprayed both sides of the nose with a mixture of lidocaine and neosynephrine.  I then passed  the scope through the nasal cavity.  The nasal cavity was noted for post sinus surgical changes. There is clear mucin present which extended into nasopharynx (upon withdrawal of scope patient noted the sensation following the passing of the drainage).  The nasopharynx was mucosally covered and symmetric.  The Eustachian tube openings were unobstructed.  Going further down I had a clear view of the base of tongue, which had normal appearing lingual tonsillar tissue.  The base of tongue was free of lesions, and the vallecula was open.  The epiglottis was smooth and mucosally covered.  The supraglottic larynx was then clearly visualized.  The vocal cords moved smoothly and symmetrically, they were slightly edematous but pearly white and no lesions were seen.  I did note a significant amount of edema and redundant mucosa in the interarytenoid soft tissues and posterior surface of the larynx. Noted erythema throughout exam.   The pyriform sinuses were open, and the limited view of the postcricoid region did not show any erosive or mass lesions.        A/P - Lisa Angeles is a 67 year old female    ICD-10-CM    1. Post-nasal drainage  R09.82 budesonide (PULMICORT) 0.5 MG/2ML neb solution     mometasone (NASONEX) 50 MCG/ACT nasal spray     Hypertonic Nasal Wash (SINUS RINSE REFILL) PACK   2. Nasal turbinate hypertrophy  J34.3 budesonide (PULMICORT) 0.5 MG/2ML neb solution     mometasone (NASONEX) 50 MCG/ACT nasal spray     Hypertonic Nasal Wash (SINUS RINSE REFILL) PACK   3. Gastroesophageal reflux disease with esophagitis without hemorrhage  K21.00    4. Globus sensation  R19.8    5. Increased sputum production  R09.3 Adult ENT  Referral     famotidine (PEPCID) 40 MG tablet       Discussed findings with Lisa today, she has post nasal drainage/ mucin present on exam which triggered a portion of her symptoms. She was found to have changes c/w LPR as well.   Upon discussion and plan, she will start rinses/  nasal spray/ LPR precautions.   If there is no improvement consider further testing including barium swallow.     Start Budesonide rinses BID  Start Nasonex 2 sprays to each nostril daily    Continue with Protonix in AM  Start Pepcid 40 mg at HS.     Follow up in 2-3 weeks for recheck.  She agrees with this plan.     Budesonide nasal saline irrigation per instructions:  -Obtain Charan Med Sinus rinse over the counter.    -Use warm distilled water and 2 packets of the salt solution that comes with the bottle, dissolve in bottle up to the 240 mL erum.  -Add 1 vial of budesonide.  -Irrigate each side of your nose leaning over the sink, using 1/3 to 1/2 the volume of the bottle in each nostril every irrigation.  Irrigate 2 times daily.  -If additional rinses are needed/recommended, you may use the plan Charan Med Sinus irrigation without the use of added budesonide        Collette Kang PA-C  ENT  [facility], [place]       Pt lives with adult children. Pt speaks [place] preferred son give diet history. No N/V/D/C now or PTA; no difficulties chewing/swallowing. Family shares cooking and shopping duties. Son reported pt with good appetite PTA, pt consume 3 meals a day. Son reported pt did not take daily finger glucose levels, follows up with primary care 1 x month for glucose monitoring. Son reported pt on medication for T2DM, unable to recall type. Discussed with son home self-monitoring, carbohydrate consistent diet with basic physiology of blood glucose levels, and low sodium nutrition therapy for BP. Provided handout: Heart Healthy Consistent Carbohydrate Nutrition Therapy for caretakers in English.

## 2022-07-12 NOTE — NURSING NOTE
"Chief Complaint   Patient presents with     Consult     Referred by Dr. Ocampo for increase sputum production.  No reflux.  She will get a weird taste in her mouth and this will come.  This started 2 months ago.       Initial /62 (BP Location: Right arm, Cuff Size: Adult Regular)   Pulse 74   Temp 97.8  F (36.6  C) (Tympanic)   Ht 1.575 m (5' 2\")   Wt 68 kg (150 lb)   SpO2 98%   BMI 27.44 kg/m   Estimated body mass index is 27.44 kg/m  as calculated from the following:    Height as of this encounter: 1.575 m (5' 2\").    Weight as of this encounter: 68 kg (150 lb).  Medication Reconciliation: complete  Eloise Isabel LPN    "

## 2022-07-12 NOTE — PATIENT INSTRUCTIONS
Start Budesonide rinses. Rinse 2 times daily.   Start Nasonex 2 sprays to each nostril daily.   Continue with protonix in the AM.   Start Pepcid 40 mg at night.  Follow up in 2-3 weeks.       Budesonide nasal saline irrigation per instructions:  -Obtain Charan Med Sinus rinse over the counter.    -Use warm distilled water and 2 packets of the salt solution that comes with the bottle, dissolve in bottle up to the 240 mL erum.  -Add 1 vial of budesonide.  -Irrigate each side of your nose leaning over the sink, using 1/3 to 1/2 the volume of the bottle in each nostril every irrigation.  Irrigate 2 times daily.  -If additional rinses are needed/recommended, you may use the plan Charan Med Sinus irrigation without the use of added budesonide      Thank you for allowing Collette Kang PA-C and our ENT team to participate in your care.  If your medications are too expensive, please give the nurse a call.  We can possibly change this medication.  If you have a scheduling or an appointment question please contact our Health Unit Coordinator at [phone], Ext. [phone].    ALL nursing questions or concerns can be directed to your ENT nurse at: [phone] [place]

## 2022-07-12 NOTE — LETTER
7/12/2022         RE: Lisa Angeles  [address] [place] [address]        Dear Colleague,    Thank you for referring your patient, Lisa Angeles, to the [facility] - [facility]. Please see a copy of my visit note below.    Chief Complaint   Patient presents with     Consult     Referred by Dr. Ocampo for increase sputum production.  No reflux.  She will get a weird taste in her mouth and this will come.  This started 2 months ago.     History of Present Illness - Lisa Angeles is a very pleasant 67 year old female  Patient presents to ENT for concerns of increase in phlegm secretions. Lisa was seen on 7/7/22 and noted throat concerns stating she had excess secretions to expel.    Today, Lisa presents for the above stated concerns and has ongoing concerns. She had felt sensation of something/ phlegm that causes a gagging sensation. She reports it occurs randomly and she must go to the bathroom to clear. She does have an associate bad taste in her mouth-bitter/ metallic- notes it occurs several times during these episodes.       Patient denies sore throat or throat pain  Denies chronic otalgia.   Denies fevers, night sweats or weight loss.     Denies dysphagia or dysphonia.   Denies globus sensation.     Water- Adequate   Caffeine- 2 cups.   ETOH- Social  Tobacco- No. Quit 26 years ago.   Reflux- No symptoms.     Hx of sinus surgery- several years ago.  No complaints of sinuses since surgery.   No PND, rhinitis  No seasonal allergies.         Past Medical History -   Patient Active Problem List   Diagnosis     Low back pain     Sciatica     Advance Care Planning     Hyperlipidemia     Basal cell carcinoma of eyebrow     Benign essential hypertension       Current Medications -   Current Outpatient Medications:      Ascorbic Acid (VITAMIN C) 500 MG CAPS, , Disp: , Rfl:      B Complex-C CAPS, Take 1 capsule by mouth daily, Disp: 90 capsule, Rfl: 3     calcium carbonate  (CALCIUM ANTACID) 500 MG chewable tablet, Take 1 tablet (500 mg) by mouth 2 times daily, Disp: 180 tablet, Rfl: 3     citalopram (CELEXA) 20 MG tablet, TAKE ONE (1) TABLET BY MOUTH EVERY DAY, Disp: 30 tablet, Rfl: 3     diazepam (VALIUM) 5 MG tablet, TAKE 1 TO 2 TABLETS BY MOUTH EVERY 6 HOURS AS NEEDED FOR MUSCLE SPASM, Disp: 40 tablet, Rfl: 0     ferrous sulfate (FEROSUL) 325 (65 Fe) MG tablet, Take 1 tablet (325 mg) by mouth every other day for 360 days, Disp: 180 tablet, Rfl: 0     fish oil-omega-3 fatty acids 1000 MG capsule, Take 3 capsules (3 g) by mouth daily, Disp: 270 capsule, Rfl: 3     hydrochlorothiazide (HYDRODIURIL) 25 MG tablet, TAKE ONE (1) TABLET (25 MG) BY MOUTH DAILY, Disp: 90 tablet, Rfl: 3     HYDROcodone-acetaminophen (NORCO) 7.5-325 MG per tablet, Take 1 tablet by mouth every 6 hours as needed for moderate to severe pain, Disp: 60 tablet, Rfl: 0     IBANdronate (BONIVA) 150 MG tablet, Take 1 tablet (150 mg) by mouth every 30 days, Disp: 3 tablet, Rfl: 3     ibuprofen (ADVIL/MOTRIN) 800 MG tablet, Take 1 tablet by mouth, Disp: , Rfl:      ibuprofen (ADVIL/MOTRIN) 800 MG tablet, TAKE 1 TABLET BY MOUTH EVERY 8 HOURS AS NEEDED, Disp: 90 tablet, Rfl: 2     Lactobacillus Acid-Pectin (LACTOBACILLUS ACIDOPHILUS) TABS, Take 1 tablet by mouth 3 times daily (before meals), Disp: , Rfl:      order for DME, Equipment being ordered: Automatic Blood Pressure Cuff, Disp: 1 Device, Rfl: 0     order for DME, Equipment being ordered: gamekeeper/thumb spica splint, Disp: 1 Device, Rfl: 0     pantoprazole (PROTONIX) 40 MG EC tablet, Take 40 mg by mouth, Disp: , Rfl:      pantoprazole (PROTONIX) 40 MG EC tablet, Take 1 tablet (40 mg) by mouth daily, Disp: 30 tablet, Rfl: 2     PREMARIN 0.3 MG tablet, TAKE ONE (1) TABLET (0.3 MG) BY MOUTH TWICE A WEEK (Patient taking differently: Once every other week.), Disp: 24 tablet, Rfl: 1     progesterone (PROMETRIUM) 100 MG capsule, TAKE ONE (1) CAPSULE (100 MG) BY MOUTH  "TWICE A WEEK (Patient taking differently: Once every other week.), Disp: 24 capsule, Rfl: 1     triamcinolone (KENALOG) 0.1 % external cream, Apply at bedtime for itching on arms and legs, Disp: 80 g, Rfl: 3     Vitamin D3 (VITAMIN D3) 25 mcg (1000 units) tablet, Take 1 tablet (25 mcg) by mouth daily, Disp: 90 tablet, Rfl: 3    Allergies - No Known Allergies    Social History -   Social History     Socioeconomic History     Marital status:    Tobacco Use     Smoking status: Former Smoker     Types: Cigarettes     Quit date: 1993     Years since quittin.9     Smokeless tobacco: Never Used     Tobacco comment: year quit: , no passive exposure   Substance and Sexual Activity     Alcohol use: Yes     Comment: socially-weekends     Drug use: No     Sexual activity: Not Currently     Partners: Male   Other Topics Concern     Blood Transfusions Yes     Caffeine Concern Yes     Comment: coffee - 3 cups daily     Exercise Yes     Comment: walking, stretches     Parent/sibling w/ CABG, MI or angioplasty before 65F 55M? No       Family History -   Family History   Problem Relation Age of Onset     Cancer Mother 59        ovarian/uterine     Cancer Father 72        brain     Ovarian Cancer Maternal Aunt      Cancer Maternal Aunt         ovarian     Cancer Maternal Aunt         uterine     Cancer Maternal Uncle         lung     Cancer Maternal Uncle         throat     Breast Cancer Other 35        cousin     Cancer Other         lung cancer       Review of Systems - As per HPI and PMHx, otherwise 10+ system review of the head and neck, and general constitution is negative.    Physical Exam  /62 (BP Location: Right arm, Cuff Size: Adult Regular)   Pulse 74   Temp 97.8  F (36.6  C) (Tympanic)   Ht 1.575 m (5' 2\")   Wt 68 kg (150 lb)   SpO2 98%   BMI 27.44 kg/m      General - The patient is well nourished and well developed, and appears to have good nutritional status.  Alert and oriented to person " and place, answers questions and cooperates with examination appropriately.   Head and Face - Normocephalic and atraumatic, with no gross asymmetry noted of the contour of the facial features.  The facial nerve is intact, with strong symmetric movements.  Voice and Breathing - The patient was breathing comfortably without the use of accessory muscles. There was no wheezing, stridor, or stertor.  The patients voice was clear and strong, and had appropriate pitch and quality.  Ears - The tympanic membranes are normal in appearance, bony landmarks are intact.  No retraction, perforation, or masses.  No fluid or purulence was seen in the external canal or the middle ear. No evidence of infection of the middle ear or external canal, cerumen was normal in appearance.  Eyes - Extraocular movements intact, and the pupils were reactive to light.  Sclera were not icteric or injected, conjunctiva were pink and moist.  Mouth - Examination of the oral cavity showed pink, healthy oral mucosa. No lesions or ulcerations noted.  The tongue was mobile and midline, and the dentition were in good condition.    Throat - The walls of the oropharynx were smooth, pink, moist, symmetric, and had no lesions or ulcerations.  The tonsillar pillars and soft palate were symmetric.  The uvula was midline on elevation.    Neck - Normal midline excursion of the laryngotracheal complex during swallowing.  Full range of motion on passive movement.  Palpation of the occipital, submental, submandibular, internal jugular chain, and supraclavicular nodes did not demonstrate any abnormal lymph nodes or masses.  The carotid pulse was palpable bilaterally.  Palpation of the thyroid was soft and smooth, with no nodules or goiter appreciated.  The trachea was mobile and midline.  Nose - External contour is symmetric, no gross deflection or scars.  Nasal mucosa is pink and moist with no abnormal mucus.  The septum was midline and non-obstructive, turbinates-  edematous.       Flexible Endoscopy -    Attempts at mirror laryngoscopy were not possible due to gag reflex.  Therefore I proceeded with a fiberoptic examination.  First I sprayed both sides of the nose with a mixture of lidocaine and neosynephrine.  I then passed the scope through the nasal cavity.  The nasal cavity was noted for post sinus surgical changes. There is clear mucin present which extended into nasopharynx (upon withdrawal of scope patient noted the sensation following the passing of the drainage).  The nasopharynx was mucosally covered and symmetric.  The Eustachian tube openings were unobstructed.  Going further down I had a clear view of the base of tongue, which had normal appearing lingual tonsillar tissue.  The base of tongue was free of lesions, and the vallecula was open.  The epiglottis was smooth and mucosally covered.  The supraglottic larynx was then clearly visualized.  The vocal cords moved smoothly and symmetrically, they were slightly edematous but pearly white and no lesions were seen.  I did note a significant amount of edema and redundant mucosa in the interarytenoid soft tissues and posterior surface of the larynx. Noted erythema throughout exam.   The pyriform sinuses were open, and the limited view of the postcricoid region did not show any erosive or mass lesions.        A/P - Lisa Angeles is a 67 year old female    ICD-10-CM    1. Post-nasal drainage  R09.82 budesonide (PULMICORT) 0.5 MG/2ML neb solution     mometasone (NASONEX) 50 MCG/ACT nasal spray     Hypertonic Nasal Wash (SINUS RINSE REFILL) PACK   2. Nasal turbinate hypertrophy  J34.3 budesonide (PULMICORT) 0.5 MG/2ML neb solution     mometasone (NASONEX) 50 MCG/ACT nasal spray     Hypertonic Nasal Wash (SINUS RINSE REFILL) PACK   3. Gastroesophageal reflux disease with esophagitis without hemorrhage  K21.00    4. Globus sensation  R19.8    5. Increased sputum production  R09.3 Adult ENT  Referral      famotidine (PEPCID) 40 MG tablet       Discussed findings with Lisa today, she has post nasal drainage/ mucin present on exam which triggered a portion of her symptoms. She was found to have changes c/w LPR as well.   Upon discussion and plan, she will start rinses/ nasal spray/ LPR precautions.   If there is no improvement consider further testing including barium swallow.     Start Budesonide rinses BID  Start Nasonex 2 sprays to each nostril daily    Continue with Protonix in AM  Start Pepcid 40 mg at HS.     Follow up in 2-3 weeks for recheck.  She agrees with this plan.     Budesonide nasal saline irrigation per instructions:  -Obtain Charan Med Sinus rinse over the counter.    -Use warm distilled water and 2 packets of the salt solution that comes with the bottle, dissolve in bottle up to the 240 mL erum.  -Add 1 vial of budesonide.  -Irrigate each side of your nose leaning over the sink, using 1/3 to 1/2 the volume of the bottle in each nostril every irrigation.  Irrigate 2 times daily.  -If additional rinses are needed/recommended, you may use the plan Charan Med Sinus irrigation without the use of added budesonide        Collette Kang PA-C  ENT  [facility], [place]          Again, thank you for allowing me to participate in the care of your patient.        Sincerely,        Collette Kang PA-C

## 2022-07-20 ENCOUNTER — TRANSFERRED RECORDS (OUTPATIENT)
Dept: HEALTH INFORMATION MANAGEMENT | Facility: CLINIC | Age: 67
End: 2022-07-20

## 2022-08-02 NOTE — PROGRESS NOTES
Chief Complaint   Patient presents with     RECHECK     Follow up PND, NTH, Gerd, globus sensation, and increased sputum production       Patient returns to ENT for follow-up exam.  Patient was seen on 7/12/2022.  At her last visit she was recommended to start budesonide rinses twice a day, Nasonex, Pepcid.  She was further recommended to continue with her Protonix.  She was recommended to start rinses nasal spray and reflux precautions.  I did review that if there is no improvement that she could complete barium swallow.      Today, Lisa  has no significant relief.  Patient states that she has not any sensation of phlegm which causes an immediate gagging sensation.  She states this does happen randomly throughout the day.  Can occur 1 time a day versus several times.   She reports it occurs randomly and she must go to the bathroom to clear. She does have an associate bad taste in her mouth-bitter/ metallic- notes it occurs several times during these episodes.        Patient denies sore throat or throat pain  Denies chronic otalgia.   Denies fevers, night sweats or weight loss.      Denies dysphagia or dysphonia.   Denies globus sensation.      Water- Adequate   Caffeine- 2 cups.   ETOH- Social  Tobacco- No. Quit 26 years ago.   Reflux- No symptoms.      Hx of sinus surgery- several years ago.  No complaints of sinuses since surgery.   No PND, rhinitis  No seasonal allergies.         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    left eyebrow     Benign essential hypertension 09/20/2018     Hyperlipidemia 03/11/2015     Low back pain 03/30/2010     Lumbago 03/30/2010     Need for prophylactic hormone replacement therapy (postmenopausal)      Sciatica 03/30/2010     Unspecified glaucoma(365.9) 05/05/2011     Unspecified sinusitis (chronic) 11/04/2010      No Known Allergies  Current Outpatient Medications   Medication     Ascorbic Acid (VITAMIN C) 500 MG CAPS     B  "Complex-C CAPS     budesonide (PULMICORT) 0.5 MG/2ML neb solution     calcium carbonate (CALCIUM ANTACID) 500 MG chewable tablet     citalopram (CELEXA) 20 MG tablet     diazepam (VALIUM) 5 MG tablet     famotidine (PEPCID) 40 MG tablet     ferrous sulfate (FEROSUL) 325 (65 Fe) MG tablet     fish oil-omega-3 fatty acids 1000 MG capsule     fluticasone (FLONASE) 50 MCG/ACT nasal spray     hydrochlorothiazide (HYDRODIURIL) 25 MG tablet     HYDROcodone-acetaminophen (NORCO) 7.5-325 MG per tablet     Hypertonic Nasal Wash (SINUS RINSE REFILL) PACK     IBANdronate (BONIVA) 150 MG tablet     ibuprofen (ADVIL/MOTRIN) 800 MG tablet     Lactobacillus Acid-Pectin (LACTOBACILLUS ACIDOPHILUS) TABS     mometasone (NASONEX) 50 MCG/ACT nasal spray     order for DME     order for DME     pantoprazole (PROTONIX) 40 MG EC tablet     PREMARIN 0.3 MG tablet     progesterone (PROMETRIUM) 100 MG capsule     triamcinolone (KENALOG) 0.1 % external cream     Vitamin D3 (VITAMIN D3) 25 mcg (1000 units) tablet     No current facility-administered medications for this visit.     ROS- SEE HPI  /68 (Cuff Size: Adult Regular)   Pulse 72   Temp 98.7  F (37.1  C) (Tympanic)   Ht 1.575 m (5' 2\")   Wt 68 kg (150 lb)   SpO2 97%   BMI 27.44 kg/m      General - The patient is well nourished and well developed, and appears to have good nutritional status.  Alert and oriented to person and place, answers questions and cooperates with examination appropriately.   Head and Face - Normocephalic and atraumatic, with no gross asymmetry noted of the contour of the facial features.  The facial nerve is intact, with strong symmetric movements.  Voice and Breathing - The patient was breathing comfortably without the use of accessory muscles. There was no wheezing, stridor, or stertor.  The patients voice was clear and strong, and had appropriate pitch and quality.  Ears - The tympanic membranes are normal in appearance, bony landmarks are intact.  No " retraction, perforation, or masses.  No fluid or purulence was seen in the external canal or the middle ear. No evidence of infection of the middle ear or external canal, cerumen was normal in appearance.  Eyes - Extraocular movements intact, and the pupils were reactive to light.  Sclera were not icteric or injected, conjunctiva were pink and moist.  Mouth - Examination of the oral cavity showed pink, healthy oral mucosa. No lesions or ulcerations noted.  The tongue was mobile and midline, and the dentition were in good condition.    Throat - The walls of the oropharynx were smooth, pink, moist, symmetric, and had no lesions or ulcerations.  The tonsillar pillars and soft palate were symmetric.  The uvula was midline on elevation.    Neck - Normal midline excursion of the laryngotracheal complex during swallowing.  Full range of motion on passive movement.  Palpation of the occipital, submental, submandibular, internal jugular chain, and supraclavicular nodes did not demonstrate any abnormal lymph nodes or masses.  The carotid pulse was palpable bilaterally.  Palpation of the thyroid was soft and smooth, with no nodules or goiter appreciated.  The trachea was mobile and midline.  Nose - External contour is symmetric, no gross deflection or scars.  Nasal mucosa is pink and moist with no abnormal mucus.  The septum was midline and non-obstructive, turbinates- edematous.         Flexible Endoscopy -     Attempts at mirror laryngoscopy were not possible due to gag reflex.  Therefore I proceeded with a fiberoptic examination.  First I sprayed both sides of the nose with a mixture of lidocaine and neosynephrine.  I then passed the scope through the nasal cavity.  The nasal cavity was noted for post sinus surgical changes. There is clear mucin present which appears less since her last visit.  There is no active purulence.  The nasopharynx was mucosally covered and symmetric.  The Eustachian tube openings were  unobstructed.  Going further down I had a clear view of the base of tongue, which had normal appearing lingual tonsillar tissue.  The base of tongue was free of lesions, and the vallecula was open.  The epiglottis was smooth and mucosally covered.  The supraglottic larynx was then clearly visualized.  The vocal cords moved smoothly and symmetrically, they were slightly edematous but pearly white and no lesions were seen.  I did note a significant amount of edema and redundant mucosa in the interarytenoid soft tissues and posterior surface of the larynx. Noted erythema throughout exam.   The pyriform sinuses were open, and the limited view of the postcricoid region did not show any erosive or mass lesions.        ASSESSMENT:    ICD-10-CM    1. Globus sensation  R19.8 XR Esophagram     Adult General Surg Referral   2. Vomiting without nausea, intractability of vomiting not specified, unspecified vomiting type  R11.11 XR Esophagram     Adult General Surg Referral   3. Gastroesophageal reflux disease with esophagitis without hemorrhage  K21.00 XR Esophagram     Adult General Surg Referral   4. Post-nasal drainage  R09.82 cetirizine (ZYRTEC) 10 MG tablet   5. Nasal turbinate hypertrophy  J34.3 cetirizine (ZYRTEC) 10 MG tablet         Discussed with clean at this time we will complete barium swallow and refer for upper endoscopy.    Continue with her current Pepcid and Protonix  Continue with good water intake and follow LPR precautions    At this time she will use budesonide rinses as needed.  Continue with Nasonex 2 sprays each nostril daily.  Trial of Zyrtec 10 mg at at bedtime.  If completion of upper scope and barium swallow remain negative and there is no definitive source would further recommend sinus imaging.    Follow-up upon completion of the above.      Collette Kang PA-C  ENT  [facility], [place]

## 2022-08-03 ENCOUNTER — OFFICE VISIT (OUTPATIENT)
Dept: OTOLARYNGOLOGY | Facility: OTHER | Age: 67
End: 2022-08-03
Attending: PHYSICIAN ASSISTANT
Payer: COMMERCIAL

## 2022-08-03 VITALS
HEART RATE: 72 BPM | DIASTOLIC BLOOD PRESSURE: 68 MMHG | WEIGHT: 150 LBS | OXYGEN SATURATION: 97 % | BODY MASS INDEX: 27.6 KG/M2 | TEMPERATURE: 98.7 F | HEIGHT: 62 IN | SYSTOLIC BLOOD PRESSURE: 122 MMHG

## 2022-08-03 DIAGNOSIS — J34.3 NASAL TURBINATE HYPERTROPHY: ICD-10-CM

## 2022-08-03 DIAGNOSIS — R11.11 VOMITING WITHOUT NAUSEA, INTRACTABILITY OF VOMITING NOT SPECIFIED, UNSPECIFIED VOMITING TYPE: ICD-10-CM

## 2022-08-03 DIAGNOSIS — K21.00 GASTROESOPHAGEAL REFLUX DISEASE WITH ESOPHAGITIS WITHOUT HEMORRHAGE: ICD-10-CM

## 2022-08-03 DIAGNOSIS — R09.82 POST-NASAL DRAINAGE: ICD-10-CM

## 2022-08-03 DIAGNOSIS — R09.A2 GLOBUS SENSATION: Primary | ICD-10-CM

## 2022-08-03 PROCEDURE — G0463 HOSPITAL OUTPT CLINIC VISIT: HCPCS | Mod: 25

## 2022-08-03 PROCEDURE — 31575 DIAGNOSTIC LARYNGOSCOPY: CPT | Performed by: PHYSICIAN ASSISTANT

## 2022-08-03 PROCEDURE — 99214 OFFICE O/P EST MOD 30 MIN: CPT | Mod: 25 | Performed by: PHYSICIAN ASSISTANT

## 2022-08-03 RX ORDER — CETIRIZINE HYDROCHLORIDE 10 MG/1
10 TABLET ORAL DAILY
Qty: 90 TABLET | Refills: 1 | Status: SHIPPED | OUTPATIENT
Start: 2022-08-03 | End: 2022-12-07

## 2022-08-03 ASSESSMENT — PAIN SCALES - GENERAL: PAINLEVEL: MODERATE PAIN (4)

## 2022-08-03 NOTE — PROGRESS NOTES
"  Assessment & Plan     1. Chronic bilateral low back pain with right-sided sciatica  Her symptoms are a result of chronic underlying condition, so medication will be sent to be filled under regular insurance.  Follow-up as needed.  - baclofen (LIORESAL) 10 MG tablet; Take 1 tablet (10 mg) by mouth 3 times daily as needed for muscle spasms  Dispense: 90 tablet; Refill: 1    2. Sciatica of right side  As above.    3. Menopause  Refilled.  - citalopram (CELEXA) 20 MG tablet; Take 1 tablet (20 mg) by mouth daily  Dispense: 30 tablet; Refill: 5    45 minutes spent on the date of the encounter doing chart review, history and exam, documentation and further activities per the note       BMI:   Estimated body mass index is 27.84 kg/m  as calculated from the following:    Height as of this encounter: 1.575 m (5' 2\").    Weight as of this encounter: 69 kg (152 lb 3.2 oz).     Return if symptoms worsen or fail to improve.    Carolyn Ritter MD  [facility] - [place] R[place] Gonzalez is a 67 year old presenting for the following health issues:  Musculoskeletal Problem      HPI     Back Pain       Duration: 2009        Specific cause: lifting    Description:   Location of pain: low back bilateral  Character of pain: sharp, dull ache, stabbing, fullness, cramping and constant  Pain radiation:radiates into the right buttocks, radiates into the right leg and radiates into the right foot  New numbness or weakness in legs, not attributed to pain:  no     Intensity: Currently 3/10    History:   Pain interferes with job: Not applicable  History of back problems: recurrent self limited episodes of low back pain in the past  Any previous MRI or X-rays: Yes- at [place].  Date 05/25/22  Sees a specialist for back pain:  No  Therapies tried without relief: acetaminophen (Tylenol), activity, cold, heat, massage, muscle relaxants, NSAIDs, Physical Therapy, rest and stretch    Alleviating factors:   Improved by: none  " "    Precipitating factors:  Worsened by: Lifting, Bending, Standing, Sitting, Lying Flat, Walking and Coughing    Patient had a previous work comp claim.  She has had ongoing symptoms.  In review of imaging, she does have arthritic changes in her back, this would not be related to work comp claim.  She did have recent review of her work comp claim, and they determined that her current symptoms are not related to her previous work comp injury, and claim was closed.    This is discussed with patient.  I would agree with that assessment.  Her arthritic changes and ongoing symptoms are chronic underlying conditions, and are not related to work comp injury.  She has recovered from the acute flare of symptoms related to her work comp injury, and now her symptoms are a result of her underlying chronic condition.  Patient expresses understanding.  Future visits for back pain and sciatica should fall under regular insurance (as compared to work comp) as these symptoms are a chronic condition not related to work comp.      Review of Systems   Constitutional, HEENT, cardiovascular, pulmonary, gi and gu systems are negative, except as otherwise noted.      Objective    /68 (BP Location: Left arm, Patient Position: Sitting, Cuff Size: Adult Regular)   Pulse 72   Temp 97.8  F (36.6  C) (Tympanic)   Resp 16   Ht 1.575 m (5' 2\")   Wt 69 kg (152 lb 3.2 oz)   SpO2 98%   BMI 27.84 kg/m    Body mass index is 27.84 kg/m .  Physical Exam   GENERAL: healthy, alert and no distress  PSYCH: mentation appears normal, affect normal/bright                .  ..  "

## 2022-08-03 NOTE — PATIENT INSTRUCTIONS
Use Budesonide rinses. Rinse as needed  Continue with nasal spray  Start a trial of Zyrtec 10 mg at night. Try for 2-4 weeks.       Complete Barium swallow  Referral to General surgery for upper scope.     If no improvement- consider further options.         Thank you for allowing Collette Kang PA-C and our ENT team to participate in your care.  If your medications are too expensive, please give the nurse a call.  We can possibly change this medication.  If you have a scheduling or an appointment question please contact our Health Unit Coordinator at [phone], Ext. [phone].    ALL nursing questions or concerns can be directed to your ENT nurse at: [phone] [place]

## 2022-08-03 NOTE — LETTER
8/3/2022         RE: Lisa Angeles  [address] [place] [address]        Dear Colleague,    Thank you for referring your patient, Lisa Angeles, to the [facility]. Please see a copy of my visit note below.    Chief Complaint   Patient presents with     RECHECK     Follow up PND, NTH, Gerd, globus sensation, and increased sputum production       Patient returns to ENT for follow-up exam.  Patient was seen on 7/12/2022.  At her last visit she was recommended to start budesonide rinses twice a day, Nasonex, Pepcid.  She was further recommended to continue with her Protonix.  She was recommended to start rinses nasal spray and reflux precautions.  I did review that if there is no improvement that she could complete barium swallow.      Today, Lisa  has no significant relief.  Patient states that she has not any sensation of phlegm which causes an immediate gagging sensation.  She states this does happen randomly throughout the day.  Can occur 1 time a day versus several times.   She reports it occurs randomly and she must go to the bathroom to clear. She does have an associate bad taste in her mouth-bitter/ metallic- notes it occurs several times during these episodes.        Patient denies sore throat or throat pain  Denies chronic otalgia.   Denies fevers, night sweats or weight loss.      Denies dysphagia or dysphonia.   Denies globus sensation.      Water- Adequate   Caffeine- 2 cups.   ETOH- Social  Tobacco- No. Quit 26 years ago.   Reflux- No symptoms.      Hx of sinus surgery- several years ago.  No complaints of sinuses since surgery.   No PND, rhinitis  No seasonal allergies.         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    left eyebrow     Benign essential hypertension 09/20/2018     Hyperlipidemia 03/11/2015     Low back pain 03/30/2010     Lumbago 03/30/2010     Need for prophylactic hormone replacement therapy  "(postmenopausal)      Sciatica 03/30/2010     Unspecified glaucoma(365.9) 05/05/2011     Unspecified sinusitis (chronic) 11/04/2010      No Known Allergies  Current Outpatient Medications   Medication     Ascorbic Acid (VITAMIN C) 500 MG CAPS     B Complex-C CAPS     budesonide (PULMICORT) 0.5 MG/2ML neb solution     calcium carbonate (CALCIUM ANTACID) 500 MG chewable tablet     citalopram (CELEXA) 20 MG tablet     diazepam (VALIUM) 5 MG tablet     famotidine (PEPCID) 40 MG tablet     ferrous sulfate (FEROSUL) 325 (65 Fe) MG tablet     fish oil-omega-3 fatty acids 1000 MG capsule     fluticasone (FLONASE) 50 MCG/ACT nasal spray     hydrochlorothiazide (HYDRODIURIL) 25 MG tablet     HYDROcodone-acetaminophen (NORCO) 7.5-325 MG per tablet     Hypertonic Nasal Wash (SINUS RINSE REFILL) PACK     IBANdronate (BONIVA) 150 MG tablet     ibuprofen (ADVIL/MOTRIN) 800 MG tablet     Lactobacillus Acid-Pectin (LACTOBACILLUS ACIDOPHILUS) TABS     mometasone (NASONEX) 50 MCG/ACT nasal spray     order for DME     order for DME     pantoprazole (PROTONIX) 40 MG EC tablet     PREMARIN 0.3 MG tablet     progesterone (PROMETRIUM) 100 MG capsule     triamcinolone (KENALOG) 0.1 % external cream     Vitamin D3 (VITAMIN D3) 25 mcg (1000 units) tablet     No current facility-administered medications for this visit.     ROS- SEE HPI  /68 (Cuff Size: Adult Regular)   Pulse 72   Temp 98.7  F (37.1  C) (Tympanic)   Ht 1.575 m (5' 2\")   Wt 68 kg (150 lb)   SpO2 97%   BMI 27.44 kg/m      General - The patient is well nourished and well developed, and appears to have good nutritional status.  Alert and oriented to person and place, answers questions and cooperates with examination appropriately.   Head and Face - Normocephalic and atraumatic, with no gross asymmetry noted of the contour of the facial features.  The facial nerve is intact, with strong symmetric movements.  Voice and Breathing - The patient was breathing comfortably " without the use of accessory muscles. There was no wheezing, stridor, or stertor.  The patients voice was clear and strong, and had appropriate pitch and quality.  Ears - The tympanic membranes are normal in appearance, bony landmarks are intact.  No retraction, perforation, or masses.  No fluid or purulence was seen in the external canal or the middle ear. No evidence of infection of the middle ear or external canal, cerumen was normal in appearance.  Eyes - Extraocular movements intact, and the pupils were reactive to light.  Sclera were not icteric or injected, conjunctiva were pink and moist.  Mouth - Examination of the oral cavity showed pink, healthy oral mucosa. No lesions or ulcerations noted.  The tongue was mobile and midline, and the dentition were in good condition.    Throat - The walls of the oropharynx were smooth, pink, moist, symmetric, and had no lesions or ulcerations.  The tonsillar pillars and soft palate were symmetric.  The uvula was midline on elevation.    Neck - Normal midline excursion of the laryngotracheal complex during swallowing.  Full range of motion on passive movement.  Palpation of the occipital, submental, submandibular, internal jugular chain, and supraclavicular nodes did not demonstrate any abnormal lymph nodes or masses.  The carotid pulse was palpable bilaterally.  Palpation of the thyroid was soft and smooth, with no nodules or goiter appreciated.  The trachea was mobile and midline.  Nose - External contour is symmetric, no gross deflection or scars.  Nasal mucosa is pink and moist with no abnormal mucus.  The septum was midline and non-obstructive, turbinates- edematous.         Flexible Endoscopy -     Attempts at mirror laryngoscopy were not possible due to gag reflex.  Therefore I proceeded with a fiberoptic examination.  First I sprayed both sides of the nose with a mixture of lidocaine and neosynephrine.  I then passed the scope through the nasal cavity.  The nasal  cavity was noted for post sinus surgical changes. There is clear mucin present which appears less since her last visit.  There is no active purulence.  The nasopharynx was mucosally covered and symmetric.  The Eustachian tube openings were unobstructed.  Going further down I had a clear view of the base of tongue, which had normal appearing lingual tonsillar tissue.  The base of tongue was free of lesions, and the vallecula was open.  The epiglottis was smooth and mucosally covered.  The supraglottic larynx was then clearly visualized.  The vocal cords moved smoothly and symmetrically, they were slightly edematous but pearly white and no lesions were seen.  I did note a significant amount of edema and redundant mucosa in the interarytenoid soft tissues and posterior surface of the larynx. Noted erythema throughout exam.   The pyriform sinuses were open, and the limited view of the postcricoid region did not show any erosive or mass lesions.        ASSESSMENT:    ICD-10-CM    1. Globus sensation  R19.8 XR Esophagram     Adult General Surg Referral   2. Vomiting without nausea, intractability of vomiting not specified, unspecified vomiting type  R11.11 XR Esophagram     Adult General Surg Referral   3. Gastroesophageal reflux disease with esophagitis without hemorrhage  K21.00 XR Esophagram     Adult General Surg Referral   4. Post-nasal drainage  R09.82 cetirizine (ZYRTEC) 10 MG tablet   5. Nasal turbinate hypertrophy  J34.3 cetirizine (ZYRTEC) 10 MG tablet         Discussed with clean at this time we will complete barium swallow and refer for upper endoscopy.    Continue with her current Pepcid and Protonix  Continue with good water intake and follow LPR precautions    At this time she will use budesonide rinses as needed.  Continue with Nasonex 2 sprays each nostril daily.  Trial of Zyrtec 10 mg at at bedtime.  If completion of upper scope and barium swallow remain negative and there is no definitive source would  further recommend sinus imaging.    Follow-up upon completion of the above.      Collette Kang PA-C  ENT  [facility], [place]          Again, thank you for allowing me to participate in the care of your patient.        Sincerely,        Collette Kang PA-C

## 2022-08-03 NOTE — NURSING NOTE
"Chief Complaint   Patient presents with     RECHECK     Follow up PND, NTH, Gerd, globus sensation, and increased sputum production       Initial /68 (Cuff Size: Adult Regular)   Pulse 72   Temp 98.7  F (37.1  C) (Tympanic)   Ht 1.575 m (5' 2\")   Wt 68 kg (150 lb)   SpO2 97%   BMI 27.44 kg/m   Estimated body mass index is 27.44 kg/m  as calculated from the following:    Height as of this encounter: 1.575 m (5' 2\").    Weight as of this encounter: 68 kg (150 lb).  Medication Reconciliation: complete  Susi Wan LPN    "

## 2022-08-04 ENCOUNTER — OFFICE VISIT (OUTPATIENT)
Dept: FAMILY MEDICINE | Facility: OTHER | Age: 67
End: 2022-08-04
Attending: FAMILY MEDICINE
Payer: COMMERCIAL

## 2022-08-04 VITALS
HEIGHT: 62 IN | HEART RATE: 72 BPM | RESPIRATION RATE: 16 BRPM | DIASTOLIC BLOOD PRESSURE: 68 MMHG | TEMPERATURE: 97.8 F | OXYGEN SATURATION: 98 % | SYSTOLIC BLOOD PRESSURE: 134 MMHG | BODY MASS INDEX: 28.01 KG/M2 | WEIGHT: 152.2 LBS

## 2022-08-04 DIAGNOSIS — Z78.0 MENOPAUSE: ICD-10-CM

## 2022-08-04 DIAGNOSIS — G89.29 CHRONIC BILATERAL LOW BACK PAIN WITH RIGHT-SIDED SCIATICA: Primary | ICD-10-CM

## 2022-08-04 DIAGNOSIS — M54.41 CHRONIC BILATERAL LOW BACK PAIN WITH RIGHT-SIDED SCIATICA: Primary | ICD-10-CM

## 2022-08-04 DIAGNOSIS — M54.31 SCIATICA OF RIGHT SIDE: ICD-10-CM

## 2022-08-04 PROCEDURE — G0463 HOSPITAL OUTPT CLINIC VISIT: HCPCS | Performed by: FAMILY MEDICINE

## 2022-08-04 PROCEDURE — 99215 OFFICE O/P EST HI 40 MIN: CPT | Performed by: FAMILY MEDICINE

## 2022-08-04 RX ORDER — BACLOFEN 10 MG/1
10 TABLET ORAL 3 TIMES DAILY PRN
Qty: 90 TABLET | Refills: 1 | Status: SHIPPED | OUTPATIENT
Start: 2022-08-04 | End: 2022-12-16

## 2022-08-04 RX ORDER — CITALOPRAM HYDROBROMIDE 20 MG/1
20 TABLET ORAL DAILY
Qty: 30 TABLET | Refills: 5 | Status: SHIPPED | OUTPATIENT
Start: 2022-08-04 | End: 2022-11-28

## 2022-08-04 RX ORDER — CITALOPRAM HYDROBROMIDE 20 MG/1
TABLET ORAL
Qty: 30 TABLET | Refills: 5 | OUTPATIENT
Start: 2022-08-04

## 2022-08-04 ASSESSMENT — PAIN SCALES - GENERAL: PAINLEVEL: MODERATE PAIN (5)

## 2022-08-04 NOTE — PATIENT INSTRUCTIONS
I agree that we are likely through the Work Comp portion of symptoms, and we are more dealing with your own chronic underlying conditions (that were likely exacerbated by the Work Comp injury at the time of injury).  I would recommend trying to get away from the Valium.  We will send Baclofen to [facility] to use for the muscle spasms.  I would recommend finishing your current prescriptions for famotidine and pantoprazole.  We will see what the Gastroenterologist thinks when you see them at the end of the month.

## 2022-08-04 NOTE — NURSING NOTE
"Chief Complaint   Patient presents with     Work Comp       Initial /68 (BP Location: Left arm, Patient Position: Sitting, Cuff Size: Adult Regular)   Pulse 72   Temp 97.8  F (36.6  C) (Tympanic)   Resp 16   Ht 1.575 m (5' 2\")   Wt 69 kg (152 lb 3.2 oz)   SpO2 98%   BMI 27.84 kg/m   Estimated body mass index is 27.84 kg/m  as calculated from the following:    Height as of this encounter: 1.575 m (5' 2\").    Weight as of this encounter: 69 kg (152 lb 3.2 oz).  Medication Reconciliation: complete  Jeni Byrne MA  "

## 2022-08-04 NOTE — Clinical Note
Work comp case is closed, this is now under regular insurance. Letter is written to send to [facility] for medication coverage, please fax to [phone]

## 2022-08-04 NOTE — LETTER
[facility]  [address]  [place] [address]  Phone: [phone]    August 16, 2022        Lisa Angeles  [address] [place] [address]          To whom it may concern:    RE: Lisa Angeles does have degenerative changes in her lumbar spine and ongoing back pain and sciatica.  She was previously treated for a Work Comp injury, which has since resolved.  Her ongoing symptoms are a results of a chronic underlying condition, NOT related to a work comp injury.  Her medications, such as muscle relaxers, pain medication, etc, should now fall under her regular insurance as these medications are needed for a chronic condition, NOT a work comp injury.    Please contact me for questions or concerns.      Sincerely,        Carolyn Ritter MD

## 2022-08-08 ENCOUNTER — HOSPITAL ENCOUNTER (OUTPATIENT)
Dept: GENERAL RADIOLOGY | Facility: OTHER | Age: 67
Discharge: HOME | End: 2022-08-08
Attending: PHYSICIAN ASSISTANT | Admitting: PHYSICIAN ASSISTANT
Payer: MEDICARE

## 2022-08-08 DIAGNOSIS — R11.11 VOMITING WITHOUT NAUSEA, INTRACTABILITY OF VOMITING NOT SPECIFIED, UNSPECIFIED VOMITING TYPE: ICD-10-CM

## 2022-08-08 DIAGNOSIS — K21.00 GASTROESOPHAGEAL REFLUX DISEASE WITH ESOPHAGITIS WITHOUT HEMORRHAGE: ICD-10-CM

## 2022-08-08 DIAGNOSIS — R09.A2 GLOBUS SENSATION: ICD-10-CM

## 2022-08-08 PROCEDURE — 74220 X-RAY XM ESOPHAGUS 1CNTRST: CPT

## 2022-08-08 PROCEDURE — 255N000001 HC RX 255: Performed by: PHYSICIAN ASSISTANT

## 2022-08-08 RX ADMIN — ANTACID/ANTIFLATULENT 4 G: 380; 550; 10; 10 GRANULE, EFFERVESCENT ORAL at 10:49

## 2022-08-24 ENCOUNTER — PREP FOR PROCEDURE (OUTPATIENT)
Dept: SURGERY | Facility: OTHER | Age: 67
End: 2022-08-24

## 2022-08-24 ENCOUNTER — OFFICE VISIT (OUTPATIENT)
Dept: SURGERY | Facility: OTHER | Age: 67
End: 2022-08-24
Attending: PHYSICIAN ASSISTANT
Payer: COMMERCIAL

## 2022-08-24 VITALS
HEART RATE: 84 BPM | HEIGHT: 62 IN | BODY MASS INDEX: 27.9 KG/M2 | DIASTOLIC BLOOD PRESSURE: 72 MMHG | OXYGEN SATURATION: 95 % | WEIGHT: 151.6 LBS | SYSTOLIC BLOOD PRESSURE: 128 MMHG | TEMPERATURE: 98.3 F

## 2022-08-24 DIAGNOSIS — K21.00 GASTROESOPHAGEAL REFLUX DISEASE WITH ESOPHAGITIS WITHOUT HEMORRHAGE: Primary | ICD-10-CM

## 2022-08-24 DIAGNOSIS — R11.11 VOMITING WITHOUT NAUSEA, INTRACTABILITY OF VOMITING NOT SPECIFIED, UNSPECIFIED VOMITING TYPE: ICD-10-CM

## 2022-08-24 DIAGNOSIS — R09.A2 GLOBUS SENSATION: ICD-10-CM

## 2022-08-24 DIAGNOSIS — K21.00 GASTROESOPHAGEAL REFLUX DISEASE WITH ESOPHAGITIS WITHOUT HEMORRHAGE: ICD-10-CM

## 2022-08-24 PROCEDURE — 99203 OFFICE O/P NEW LOW 30 MIN: CPT | Performed by: SURGERY

## 2022-08-24 PROCEDURE — G0463 HOSPITAL OUTPT CLINIC VISIT: HCPCS

## 2022-08-24 ASSESSMENT — PAIN SCALES - GENERAL: PAINLEVEL: MILD PAIN (3)

## 2022-08-24 NOTE — NURSING NOTE
"Chief Complaint   Patient presents with     Consult     Globus sensation, GERD with esophagitis, vomiting without nausea        Initial /72 (Cuff Size: Adult Regular)   Pulse 84   Temp 98.3  F (36.8  C) (Tympanic)   Ht 1.575 m (5' 2\")   Wt 68.8 kg (151 lb 9.6 oz)   SpO2 95%   BMI 27.73 kg/m   Estimated body mass index is 27.73 kg/m  as calculated from the following:    Height as of this encounter: 1.575 m (5' 2\").    Weight as of this encounter: 68.8 kg (151 lb 9.6 oz).  Medication Reconciliation: complete  Susi Wan LPN    "

## 2022-08-24 NOTE — PATIENT INSTRUCTIONS
Thank you for allowing our surgical team to participate in your care. Please review the following instructions to prepare for your upcoming Upper Endoscopy. You may call any of the numbers listed below with questions you may have.  [facility] Health Unit Coordinator: [phone]  Clinic Surgery Nurse ([facility]): [phone]  Surgery Education Nurse: [phone]    Date of Procedure: 9/29/22 with Dr. Francisco Javier Walls  Admit time: Hospital surgery will call you the day before your procedure by 5pm with your admit time. If your surgery is on Monday, please expect a call on Friday. If we were unable to reach you by 5pm, you may call  [phone] for your arrival time.    You do not need a preoperative appointment your primary care provider within 30 days prior to surgery.    COVID-19 test is needed prior to procedure, even if you've been vaccinated.  If you are going home on the same day as your procedure (surgery):   1-2 days before your procedure, take an at-home, rapid antigen test. You can buy these at many stores. Or you can order free, at-home tests at [email or web address].gov/tests. If you can't find an at-home antigen test, please schedule a PCR test with [facility] by calling [phone], or visiting [email or web address]. We can't accept tests that are more than 4 days old.   If your test is NEGATIVE, please take a photo of the test. Show the photo to the nurse when you come in for your procedure (surgery)  If your test is POSITIVE, please contact the Pre-Admission office at [phone]. If you are calling after 5 PM on weekdays, and on the weekend, please call [phone] and ask to speak with the House Supervisor.  If you are staying overnight in the hospital you will need to get a PCR covid test 2-4 days prior to procedure (surgery).     Please call the Surgery Education Nurses 1 week prior to your surgery date at [phone] for further instructions. Have your medication/allergy lists ready.    Do not take Aspirin or  NSAIDs (Ibuprofen, Motrin, Aleve, Celebrex, Naproxen) vitamins/supplements 7 days before your procedure.   If you are on blood thinners or insulin, please call your primary care provider for instruction. If you are prescribed a daily 81 mg Aspirin, you may continue.    Please call the clinic surgery nurse or your regular doctor if you get sick within 2 weeks of the procedure. (vomiting, diarrhea, fever, cough, cold or any other symptoms of illness)    Do not eat any solid foods or milk products after 10pm the night prior to surgery.   You may have clear liquids only up until 4 hours prior to surgery.   Please see the table below for a list of clear liquids.     You will need a responsible adult available to drive you home and stay with you for at least 4 hours after you leave the hospital. You will not be allowed to drive yourself. If you need to take a taxi or the bus you must have a responsible person to ride with you (not the taxi/). Your procedure will be cancelled if you do not bring a responsible adult.    Questions or concerns can be directed to the clinic or surgery education nurse at any of the numbers listed above. If you have a scheduling or appointment question, please call the Health Unit Coordinator between 8am and 4pm Monday through Friday. After hours or on weekends, please call [phone] to postpone.       Clear Liquid Diet  You may have: Do NOT have:     ? Tea, coffee (no cream)   Milk or milk products such as ice cream, malts or shakes     ? Water, Vitamin Water, Smart Water, Coconut Water, PowerAde, Propel, Soda pop, (Sprite, 7 UP, Ginger Ale, Gatorade (not red or purple)   Red or purple drinks of any kind such as  Cranberry juice or grape juice.     ? Clear nutrition drinks (Resource Breeze, Ensure Active protein drink (peach flavor)   Red or purple Jell-O, Popsicles, Roney-Aid, Sorbet and candy.     ? Jell-O, Popsicles (no milk or fruit pieces) or Italian ice (not red or purple)    Juices with pulp such as orange, grapefruit, pineapple or tomato juice               Honey, Sugar   Cream soups of any kind     ? Fat-free soup broth or bouillon   Alcohol     ? Plain hard candy, such as clear Life Savers (not red or purple)      ? Powdered Lemonade such as Crystal Light, Country Time      ? Clear juices and fruit-flavored drinks such as apple juice, white grape juice, Hi-C and Roney-Aid (not red or purple)          SURGERY HANDBOOK            Your surgery is scheduled:    Date: 9/29/22  ________________________________    Time: hospital surgery will call the day prior to your procedure with arrival time  ________________________________      Surgeon's Name: Dr. Walls  _______________________        Pre-Op Physical Fax Numbers:          Pre-Admissions  [phone]        Your surgery is located at:  [place] [address]         Before Your Surgery  For Patients and Visitors at [facility]    Welcome  As you get ready for surgery, you may have a lot of questions.  This brochure will help you know what to expect before and after surgery.  You and your family are the most important members of your health care team.  You will need to take an active role in your care.    Be sure to ask questions and learn all that you can about your surgery.  If you have any safety concerns, we urge you to tell a nurse as soon as possible.   This brochure is for information only.  It does not replace the advice of your doctor.  Always follow your doctor's advice.    If you or your  are deaf or hard of hearing, or prefer a language other than English, please let us know.  We have many free services, including interpreters and other aids to help you communicate. You may ask for help  through any member of your care team or by calling Language Services at [phone], option 2.    GETTING READY FOR SURGERY  Always follow your surgeon's instructions.  If you  don't, your surgery could be canceled.  Please use the following checklist.  You have been scheduled for surgery and we would like to give you some information that will assist in helping get the best possible outcome.      Before Surgery:   If for any reason you decide not to have the surgery, please contact your surgeon's office.  We can easily cancel or reschedule the procedure. If it is after hours, please call [phone].    Please keep in mind that the time of surgery is subject to change.  Make sure you have nothing to eat after midnight. If your surgery is later in the afternoon, this recommendation might change, but not until the day before surgery after the actual time of the surgery has been established.    Within 30 Days of Surgery:   Have a pre-surgery physical exam with your family doctor or partner.  If you use a [facility], all of your information from the pre-op physical will be in the Epic computer system.  Ask the doctor to send all of your results to the hospital before the surgery.  The doctor also may ask you to bring the results with you on the day of surgery or you can fax them to [phone].    Tell the doctor if:  You are allergic to latex or rubber  (Latex and rubber gloves are often used in medical care).  You are taking any medicines (including aspirin), vitamins (Vitamin E, Fish Oil, Omegas) or herbal products.  You will need to stop taking some medicines before surgery.  You have any medical problems (allergies, diabetes or heart disease, for example).  You have a pacemaker or an AICD (automatic implanted cardiac defibrillator).  If you do, please bring the ID card with you on the day of surgery.  You are a smoker.  People who smoke have a higher risk of infection after surgery.  Ask your doctor how you can quit smoking.    Within 7 days of Surgery:  Prior to your surgical procedure, a nurse will be contacting you to obtain a health history. A nurse will call you within  a few days of surgery to go over these and other instructions.  If you do not hear from them, please call them at [phone].      Call your insurance company.  Ask if you need pre-approval for your surgery.  If you do not have insurance, please let us know.  Arrange for someone to drive you home after surgery.  If you will have same-day surgery, you may not drive or take public transportation home by yourself.  Arrange for someone to stay with you for 24 hours after you go home.  This person must be a responsible adult (ie- Family member or friend).    The Day Before Surgery:   Call your surgeon if there are any changes in your health.  This includes signs of a cold or flu (such as a sore throat, runny nose, cough, rash or fever).  Do not smoke, drink alcohol or take over-the-counter medicine (unless your surgeon tells you to) for 24 hours before and after surgery.  If you take prescribed drugs:  You may need to stop them until after the surgery.  Follow your doctor's orders.  You may resume Aspirin and/or blood thinners after your surgery as directed by your physician/surgeon.  NO SOLID FOOD. CLEAR LIQUIDS ONLY.  Follow your surgeon's orders for eating and drinking.  You need to have an empty stomach before surgery.  This will make the surgery as safe as possible.  If you don't follow your doctor's orders, your surgery could be changed to another date.    The Day of Surgery:  Please remove deodorant, cologne, scented lotion, makeup, nail polish and jewelry (including rings and body piercings).  If you wear artificial nails, please remove at least one nail before coming to the hospital.  Wear clean, loose clothing to the hospital.  Bring these items to the hospital:  Your insurance card.  A list of all the medicines you take.  Include vitamins, minerals, herbs and over-the-counter drugs.  Note any drug allergies.  A copy of your advance health care directive, if you have one.  This tells us what treatment you  would want -- and who would make health care decisions -- if you could no longer speak for yourself.  You may request this form in advance or download it from [email or web address].  A case for any glasses, contact lenses, hearing aids or dentures.  Your inhaler or CPAP machine, if you use these at home.  Leave extra cash, jewelry and other valuables at home.    When You Arrive:  When you get to the hospital, you will:  Check in.  If you are under age 18, you must be with a parent or legal guardian.  Electronically sign consent forms, if you haven't already.  These electronic forms state that you know the risks and benefits of surgery.  When you sign the forms, you give us permission to do the surgery.  Do not sign them unless you understand what will happen during and after your surgery.  If you have any questions about your surgery, ask to speak with your doctor before you sign the forms.  If you don't understand the answers, ask again.  Receive a copy of the Patients Bill of Rights.  If you do not receive a copy, please ask for one.  Change into hospital clothes.  Your belongings will be placed in a bag.  We will return them to you after surgery.  Meet with the anesthesia provider.  He or she will tell you what kind of anesthesia (medicine) will be used to keep you comfortable during surgery.  Remember: It's okay to remind doctors and nurses to wash their hands before touching you.   In most cases, your surgeon will use a marker to write his or her initials on the surgery site.  This ensures that the exact site is operated on.  For safety reasons, we will ask you the same questions many times.  For example, we may ask your name and birth date over and over again.  Friends and family can stay with you until it's time for surgery.  While you're in surgery, they will be in the waiting area.  Please note that cell phones are not allowed in some patient care areas.  If you have questions about what will happen in  the operating room, talk to your care team.    After Surgery:  We will move you to a recovery room where we will watch you closely.  If you have any pain or discomfort, tell your nurse.  He or she will try to make you comfortable.    If you are staying overnight we will move you to your hospital room after you are awake.  If you are going home we will move you to another room.  Friends and family may be able to join you.  The length of time you spend in recovery depends on the type of medicine you received, your medical condition, and the type of surgery you had.    Going Home:  We will let you know when you're ready to leave the hospital.  Before you leave, we will tell you how to care for yourself at home.  If you do not understand something, please say so.  We will answer any questions you have.  We will then help you get ready to leave.  When you are discharged from the recovery room, the nurses will review instructions with you and your caregiver.  You must have an adult with you for the first 4 hours after you leave the hospital. Take it easy when you get home.  You will need some time to recover -- you may be more tired than you realize at first.  Rest and relax for rest of the day at home.  You'll feel better and heal faster if you take good care of yourself.  Symptoms of infection need to be reported to your surgery office. Please call your Surgeon.      Thank you for following these important instructions.
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
## 2022-08-26 ENCOUNTER — TELEPHONE (OUTPATIENT)
Dept: FAMILY MEDICINE | Facility: OTHER | Age: 67
End: 2022-08-26

## 2022-08-26 ENCOUNTER — TELEPHONE (OUTPATIENT)
Dept: SURGERY | Facility: OTHER | Age: 67
End: 2022-08-26

## 2022-08-26 NOTE — TELEPHONE ENCOUNTER
8:42 AM    Reason for Call: Phone Call    Description: pt is wondering why she is doing an Xray at [facility] in [place]. Please call pt.    Was an appointment offered for this call? No  If yes : Appointment type              Date    Preferred method for responding to this message: Telephone Call  What is your phone number ? [phone]    If we cannot reach you directly, may we leave a detailed response at the number you provided? Yes    Can this message wait until your PCP/provider returns, if available today? Erma Stokes

## 2022-08-26 NOTE — TELEPHONE ENCOUNTER
Patient was wondering if she needed to complete her XR esophagram at [facility] again. Patient had already completed this on 8/8/22. Patient was given her results already on that. She is scheduled for upper endoscopy with  on 9/29/22.   Patient was advised that she does not need to complete the esophagram again since she just had this done. Lindsey Farris LPN

## 2022-09-04 ENCOUNTER — HEALTH MAINTENANCE LETTER (OUTPATIENT)
Age: 67
End: 2022-09-04

## 2022-09-08 DIAGNOSIS — M54.31 SCIATICA OF RIGHT SIDE: ICD-10-CM

## 2022-09-08 DIAGNOSIS — M54.41 CHRONIC BILATERAL LOW BACK PAIN WITH RIGHT-SIDED SCIATICA: ICD-10-CM

## 2022-09-08 DIAGNOSIS — G89.29 CHRONIC BILATERAL LOW BACK PAIN WITH RIGHT-SIDED SCIATICA: ICD-10-CM

## 2022-09-08 NOTE — TELEPHONE ENCOUNTER
Norco      Last Written Prescription Date:  10/7/21  Last Fill Quantity: 60,   # refills: 0  Last Office Visit: 8/4/22  Future Office visit:       Routing refill request to provider for review/approval because:

## 2022-09-09 RX ORDER — HYDROCODONE BITARTRATE AND ACETAMINOPHEN 7.5; 325 MG/1; MG/1
1 TABLET ORAL EVERY 6 HOURS PRN
Qty: 30 TABLET | Refills: 0 | Status: SHIPPED | OUTPATIENT
Start: 2022-09-09 | End: 2022-12-16

## 2022-09-14 ENCOUNTER — HOSPITAL ENCOUNTER (EMERGENCY)
Facility: HOSPITAL | Age: 67
Discharge: HOME | End: 2022-09-14
Attending: NURSE PRACTITIONER | Admitting: NURSE PRACTITIONER
Payer: MEDICARE

## 2022-09-14 ENCOUNTER — NURSE TRIAGE (OUTPATIENT)
Dept: FAMILY MEDICINE | Facility: OTHER | Age: 67
End: 2022-09-14

## 2022-09-14 ENCOUNTER — APPOINTMENT (OUTPATIENT)
Dept: GENERAL RADIOLOGY | Facility: HOSPITAL | Age: 67
End: 2022-09-14
Attending: NURSE PRACTITIONER
Payer: MEDICARE

## 2022-09-14 VITALS
TEMPERATURE: 98.3 F | HEART RATE: 77 BPM | OXYGEN SATURATION: 95 % | SYSTOLIC BLOOD PRESSURE: 135 MMHG | RESPIRATION RATE: 16 BRPM | DIASTOLIC BLOOD PRESSURE: 82 MMHG

## 2022-09-14 DIAGNOSIS — M25.551 ACUTE RIGHT HIP PAIN: Primary | ICD-10-CM

## 2022-09-14 PROCEDURE — G0463 HOSPITAL OUTPT CLINIC VISIT: HCPCS

## 2022-09-14 PROCEDURE — 73502 X-RAY EXAM HIP UNI 2-3 VIEWS: CPT

## 2022-09-14 PROCEDURE — 99213 OFFICE O/P EST LOW 20 MIN: CPT | Performed by: NURSE PRACTITIONER

## 2022-09-14 ASSESSMENT — ENCOUNTER SYMPTOMS
ARTHRALGIAS: 1
NECK PAIN: 0
MYALGIAS: 1
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
## 2022-09-14 NOTE — DISCHARGE INSTRUCTIONS
Continue taking ibuprofen as needed for pain. Take baclofen that you have at home. Apply ice packs to your hip every 15 to 20 minutes on and off.    Follow-up with your doctor if no improvement in symptoms.    Return to emergency department for worsening concerning symptoms.
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
## 2022-09-19 DIAGNOSIS — M81.0 AGE-RELATED OSTEOPOROSIS WITHOUT CURRENT PATHOLOGICAL FRACTURE: ICD-10-CM

## 2022-09-19 RX ORDER — IBANDRONATE SODIUM 150 MG/1
TABLET, FILM COATED ORAL
Qty: 3 TABLET | Refills: 2 | Status: SHIPPED | OUTPATIENT
Start: 2022-09-19 | End: 2023-06-01

## 2022-09-21 ENCOUNTER — TELEPHONE (OUTPATIENT)
Dept: SURGERY | Facility: OTHER | Age: 67
End: 2022-09-21

## 2022-09-21 RX ORDER — FAMOTIDINE 10 MG
40 TABLET ORAL
COMMUNITY
End: 2022-12-16

## 2022-09-21 RX ORDER — PANTOPRAZOLE SODIUM 20 MG/1
40 TABLET, DELAYED RELEASE ORAL DAILY
COMMUNITY
End: 2022-12-07

## 2022-09-21 NOTE — TELEPHONE ENCOUNTER
Patient has some questions regarding medication and also about acrylic nails and if they need to come off. Please call patient back at [phone]

## 2022-09-22 ENCOUNTER — ANESTHESIA EVENT (OUTPATIENT)
Dept: SURGERY | Facility: HOSPITAL | Age: 67
End: 2022-09-22
Payer: MEDICARE

## 2022-09-22 ASSESSMENT — LIFESTYLE VARIABLES: TOBACCO_USE: 1
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
## 2022-09-29 ENCOUNTER — ANESTHESIA (OUTPATIENT)
Dept: SURGERY | Facility: HOSPITAL | Age: 67
End: 2022-09-29
Payer: MEDICARE

## 2022-09-29 ENCOUNTER — HOSPITAL ENCOUNTER (OUTPATIENT)
Facility: HOSPITAL | Age: 67
Discharge: HOME | End: 2022-09-29
Attending: SURGERY | Admitting: SURGERY
Payer: MEDICARE

## 2022-09-29 VITALS
BODY MASS INDEX: 28.3 KG/M2 | SYSTOLIC BLOOD PRESSURE: 104 MMHG | HEART RATE: 60 BPM | TEMPERATURE: 97 F | RESPIRATION RATE: 16 BRPM | OXYGEN SATURATION: 99 % | WEIGHT: 153.8 LBS | HEIGHT: 62 IN | DIASTOLIC BLOOD PRESSURE: 67 MMHG

## 2022-09-29 PROCEDURE — 250N000009 HC RX 250: Performed by: NURSE ANESTHETIST, CERTIFIED REGISTERED

## 2022-09-29 PROCEDURE — 360N000075 HC SURGERY LEVEL 2, PER MIN: Performed by: SURGERY

## 2022-09-29 PROCEDURE — 43239 EGD BIOPSY SINGLE/MULTIPLE: CPT | Performed by: NURSE ANESTHETIST, CERTIFIED REGISTERED

## 2022-09-29 PROCEDURE — 999N000141 HC STATISTIC PRE-PROCEDURE NURSING ASSESSMENT: Performed by: SURGERY

## 2022-09-29 PROCEDURE — 370N000017 HC ANESTHESIA TECHNICAL FEE, PER MIN: Performed by: SURGERY

## 2022-09-29 PROCEDURE — 43239 EGD BIOPSY SINGLE/MULTIPLE: CPT | Performed by: SURGERY

## 2022-09-29 PROCEDURE — 272N000001 HC OR GENERAL SUPPLY STERILE: Performed by: SURGERY

## 2022-09-29 PROCEDURE — 710N000012 HC RECOVERY PHASE 2, PER MINUTE: Performed by: SURGERY

## 2022-09-29 PROCEDURE — 88305 TISSUE EXAM BY PATHOLOGIST: CPT | Mod: TC | Performed by: SURGERY

## 2022-09-29 PROCEDURE — 250N000011 HC RX IP 250 OP 636: Performed by: NURSE ANESTHETIST, CERTIFIED REGISTERED

## 2022-09-29 RX ORDER — ONDANSETRON 4 MG/1
4 TABLET, ORALLY DISINTEGRATING ORAL EVERY 30 MIN PRN
Status: DISCONTINUED | OUTPATIENT
Start: 2022-09-29 | End: 2022-09-29 | Stop reason: HOSPADM

## 2022-09-29 RX ORDER — LIDOCAINE HYDROCHLORIDE 20 MG/ML
INJECTION, SOLUTION INFILTRATION; PERINEURAL PRN
Status: DISCONTINUED | OUTPATIENT
Start: 2022-09-29 | End: 2022-09-29

## 2022-09-29 RX ORDER — ONDANSETRON 2 MG/ML
4 INJECTION INTRAMUSCULAR; INTRAVENOUS EVERY 30 MIN PRN
Status: DISCONTINUED | OUTPATIENT
Start: 2022-09-29 | End: 2022-09-29 | Stop reason: HOSPADM

## 2022-09-29 RX ORDER — LIDOCAINE 40 MG/G
CREAM TOPICAL
Status: DISCONTINUED | OUTPATIENT
Start: 2022-09-29 | End: 2022-09-29 | Stop reason: HOSPADM

## 2022-09-29 RX ORDER — NALOXONE HYDROCHLORIDE 0.4 MG/ML
0.2 INJECTION, SOLUTION INTRAMUSCULAR; INTRAVENOUS; SUBCUTANEOUS
Status: DISCONTINUED | OUTPATIENT
Start: 2022-09-29 | End: 2022-09-29 | Stop reason: HOSPADM

## 2022-09-29 RX ORDER — NALOXONE HYDROCHLORIDE 0.4 MG/ML
0.4 INJECTION, SOLUTION INTRAMUSCULAR; INTRAVENOUS; SUBCUTANEOUS
Status: DISCONTINUED | OUTPATIENT
Start: 2022-09-29 | End: 2022-09-29 | Stop reason: HOSPADM

## 2022-09-29 RX ORDER — SODIUM CHLORIDE, SODIUM LACTATE, POTASSIUM CHLORIDE, CALCIUM CHLORIDE 600; 310; 30; 20 MG/100ML; MG/100ML; MG/100ML; MG/100ML
INJECTION, SOLUTION INTRAVENOUS CONTINUOUS
Status: DISCONTINUED | OUTPATIENT
Start: 2022-09-29 | End: 2022-09-29 | Stop reason: HOSPADM

## 2022-09-29 RX ORDER — PROPOFOL 10 MG/ML
INJECTION, EMULSION INTRAVENOUS PRN
Status: DISCONTINUED | OUTPATIENT
Start: 2022-09-29 | End: 2022-09-29

## 2022-09-29 RX ORDER — MEPERIDINE HYDROCHLORIDE 25 MG/ML
12.5 INJECTION INTRAMUSCULAR; INTRAVENOUS; SUBCUTANEOUS
Status: DISCONTINUED | OUTPATIENT
Start: 2022-09-29 | End: 2022-09-29 | Stop reason: HOSPADM

## 2022-09-29 RX ADMIN — PROPOFOL 50 MG: 10 INJECTION, EMULSION INTRAVENOUS at 13:21

## 2022-09-29 RX ADMIN — PROPOFOL 50 MG: 10 INJECTION, EMULSION INTRAVENOUS at 13:18

## 2022-09-29 RX ADMIN — LIDOCAINE HYDROCHLORIDE 60 MG: 20 INJECTION, SOLUTION INFILTRATION; PERINEURAL at 13:17

## 2022-09-29 ASSESSMENT — ACTIVITIES OF DAILY LIVING (ADL): ADLS_ACUITY_SCORE: 35

## 2022-09-29 NOTE — ANESTHESIA POSTPROCEDURE EVALUATION
Patient: Lisa Angeles    Procedure: Procedure(s):  Upper Endoscopy with biopsy       Anesthesia Type:  MAC    Note:  Disposition: Outpatient   Postop Pain Control: Uneventful            Sign Out: Well controlled pain   PONV: No   Neuro/Psych: Uneventful            Sign Out: Acceptable/Baseline neuro status   Airway/Respiratory: Uneventful            Sign Out: Acceptable/Baseline resp. status   CV/Hemodynamics: Uneventful            Sign Out: Acceptable CV status; No obvious hypovolemia; No obvious fluid overload   Other NRE: NONE   DID A NON-ROUTINE EVENT OCCUR? No           Last vitals:  Vitals Value Taken Time   /67 09/29/22 1355   Temp 97  F (36.1  C) 09/29/22 1400   Pulse 60 09/29/22 1355   Resp 16 09/29/22 1355   SpO2 98 % 09/29/22 1359   Vitals shown include unvalidated device data.    Electronically Signed By: ROSELYN MENDOZA CRNA  September 29, 2022  2:13 PM
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
## 2022-09-29 NOTE — ANESTHESIA CARE TRANSFER NOTE
Patient: Lisa Angeles    Procedure: Procedure(s):  Upper Endoscopy with biopsy       Diagnosis: Gastroesophageal reflux disease with esophagitis without hemorrhage [K21.00]  Diagnosis Additional Information: No value filed.    Anesthesia Type:   MAC     Note:    Oropharynx: oropharynx clear of all foreign objects and spontaneously breathing  Level of Consciousness: awake      Independent Airway: airway patency satisfactory and stable  Dentition: dentition unchanged  Vital Signs Stable: post-procedure vital signs reviewed and stable  Report to RN Given: handoff report given  Patient transferred to: Phase II    Handoff Report: Identifed the Patient, Identified the Reponsible Provider, Reviewed the pertinent medical history, Discussed the surgical course, Reviewed Intra-OP anesthesia mangement and issues during anesthesia, Set expectations for post-procedure period and Allowed opportunity for questions and acknowledgement of understanding      Vitals:  Vitals Value Taken Time   BP     Temp     Pulse     Resp     SpO2 97 % 09/29/22 1329   Vitals shown include unvalidated device data.    Electronically Signed By: ROSELYN MENDOZA CRNA  September 29, 2022  1:30 PM

## 2022-09-29 NOTE — OP NOTE
REPORT OF OPERATION  DATE OF PROCEDURE: 9/29/2022    PATIENT: Lisa Angeles    SURGERY PERFORMED: Esophagogastroduodenoscopy with biopsies     PREOPERATIVE DIAGNOSIS: cough    POSTOPERATIVE DIAGNOSIS:    Normal Esophagogastroduodenoscopy   Squamous columnar junction at 35    SURGEON: Francisco Javier Walls MD    ASSISTANTS: None    ANESTHESIA: Monitored Anesthesia Care    COMPLICATIONS: None apparent    TRANSFUSIONS: None    TISSUE TO PATHOLOGY: Duodenal, Antral and Distal Esophageal    FINDINGS: Normal Esophagogastroduodenoscopy    INDICATIONS: This is a 67 year old female in need of an Esophagogastroduodenoscopy for cough.  The patient will be taken to the endoscopy suite for that procedure.    DESCRIPTIONS OF PROCEDURE IN DETAIL: After consent was obtained the patient was taken to the operative suite and traci in the left lateral decubitus position.  The patient was identified and the correct patient was confirmed. Monitored Anesthesia Care was given by anesthesia. A time out was performed verifying the correct patient and the correct procedure.  The entire operative team was in agreement.  All necessary equipment and supplies were in the room.    The endoscope was inserted into the mouth and passed without difficulty to the third portion of the duodenum.  Duodenal biopsies were taken.  The endoscope was then withdrawn through the duodenum, the duodenal bulb and pyloric channel and no abnormalities were noted.  The endoscope was brought back into the stomach and antral biopsies were obtained.  The endoscope was then retroflexed and no lesions of the fundus body or antrum were seen.  The endoscope was straightened back out and brought into the distal esophagus and a well-defined squamocolumnar junction was identified at 35 cm. Biopsies of the distal esophagus were taken.  The endoscope was slowly withdrawn through the remaining esophagus no other abnormalities are seen,  The endoscope was withdrawn from the patient  the patient was then taken to the recovery room in stable condition tolerated the procedure well.

## 2022-10-03 DIAGNOSIS — R09.3 INCREASED SPUTUM PRODUCTION: ICD-10-CM

## 2022-10-03 DIAGNOSIS — R11.11 VOMITING WITHOUT NAUSEA: ICD-10-CM

## 2022-10-04 LAB
PATH REPORT.COMMENTS IMP SPEC: NORMAL
PATH REPORT.FINAL DX SPEC: NORMAL
PATH REPORT.GROSS SPEC: NORMAL
PATH REPORT.MICROSCOPIC SPEC OTHER STN: NORMAL
PATH REPORT.RELEVANT HX SPEC: NORMAL
PHOTO IMAGE: NORMAL

## 2022-10-04 PROCEDURE — 88305 TISSUE EXAM BY PATHOLOGIST: CPT | Mod: 26 | Performed by: PATHOLOGY

## 2022-10-04 NOTE — TELEPHONE ENCOUNTER
famotodine      Last Written Prescription Date:  9/21/22  Last Fill Quantity: 90,   # refills: 0  Last Office Visit: 8/24/22  Future Office visit:       Routing refill request to provider for review/approval because:

## 2022-10-05 RX ORDER — FAMOTIDINE 40 MG/1
TABLET, FILM COATED ORAL
Qty: 30 TABLET | Refills: 5 | Status: SHIPPED | OUTPATIENT
Start: 2022-10-05 | End: 2022-12-16

## 2022-10-05 NOTE — TELEPHONE ENCOUNTER
Protonix      Last Written Prescription Date:  Pt reported  Last Fill Quantity: Unknown,   # refills: unknown  Last Office Visit: 08/04/22  Future Office visit:

## 2022-10-06 RX ORDER — PANTOPRAZOLE SODIUM 40 MG/1
TABLET, DELAYED RELEASE ORAL
Qty: 30 TABLET | Refills: 5 | Status: SHIPPED | OUTPATIENT
Start: 2022-10-06 | End: 2022-12-07

## 2022-10-12 ENCOUNTER — OFFICE VISIT (OUTPATIENT)
Dept: SURGERY | Facility: OTHER | Age: 67
End: 2022-10-12
Attending: NURSE PRACTITIONER
Payer: COMMERCIAL

## 2022-10-12 VITALS
SYSTOLIC BLOOD PRESSURE: 118 MMHG | HEART RATE: 76 BPM | BODY MASS INDEX: 27.6 KG/M2 | HEIGHT: 62 IN | OXYGEN SATURATION: 97 % | DIASTOLIC BLOOD PRESSURE: 64 MMHG | TEMPERATURE: 98 F | WEIGHT: 150 LBS

## 2022-10-12 DIAGNOSIS — R09.3 ABNORMAL AMOUNT OF SPUTUM: Primary | ICD-10-CM

## 2022-10-12 PROCEDURE — 99213 OFFICE O/P EST LOW 20 MIN: CPT | Performed by: NURSE PRACTITIONER

## 2022-10-12 ASSESSMENT — PAIN SCALES - GENERAL: PAINLEVEL: MODERATE PAIN (4)

## 2022-10-12 NOTE — PROGRESS NOTES
CLINIC NOTE - POST-OP ENDOSCOPY  10/12/2022    Patient:Lisa Angeles    Procedure: Esophagogastroduodenoscopy with biopsy    This is a 67 year old female who is 2 weeks s/p Esophagogastroduodenoscopy with biopsy.  The EGD was done for increased sputum production which the patient continues to have. She states anti-acid medications don't help the sputum production.  She has seen ENT for her symptoms.     Current Medications:  Current Outpatient Medications   Medication Sig Dispense Refill     Ascorbic Acid (VITAMIN C) 500 MG CAPS        B Complex-C CAPS Take 1 capsule by mouth daily 90 capsule 3     baclofen (LIORESAL) 10 MG tablet Take 1 tablet (10 mg) by mouth 3 times daily as needed for muscle spasms (Patient taking differently: Take 10 mg by mouth 3 times daily as needed for muscle spasms (PRN)) 90 tablet 1     calcium carbonate (CALCIUM ANTACID) 500 MG chewable tablet Take 1 tablet (500 mg) by mouth 2 times daily 180 tablet 3     cetirizine (ZYRTEC) 10 MG tablet Take 1 tablet (10 mg) by mouth daily 90 tablet 1     diazepam (VALIUM) 5 MG tablet TAKE 1 TO 2 TABLETS BY MOUTH EVERY 6 HOURS AS NEEDED FOR MUSCLE SPASM 40 tablet 0     ferrous sulfate (FEROSUL) 325 (65 Fe) MG tablet Take 1 tablet (325 mg) by mouth every other day for 360 days 180 tablet 0     fish oil-omega-3 fatty acids 1000 MG capsule Take 3 capsules (3 g) by mouth daily 270 capsule 3     fluticasone (FLONASE) 50 MCG/ACT nasal spray Spray 2 sprays into both nostrils daily 16 g 11     hydrochlorothiazide (HYDRODIURIL) 25 MG tablet TAKE ONE (1) TABLET (25 MG) BY MOUTH DAILY 90 tablet 3     HYDROcodone-acetaminophen (NORCO) 7.5-325 MG per tablet Take 1 tablet by mouth every 6 hours as needed for moderate to severe pain 30 tablet 0     IBANdronate (BONIVA) 150 MG tablet TAKE ONE (1) TABLET (150 MG) BY MOUTH EVERY 30 DAYS 3 tablet 2     ibuprofen (ADVIL/MOTRIN) 800 MG tablet TAKE 1 TABLET BY MOUTH EVERY 8 HOURS AS NEEDED 90 tablet 2     Lactobacillus  "Acid-Pectin (LACTOBACILLUS ACIDOPHILUS) TABS Take 1 tablet by mouth 3 times daily (before meals)       order for DME Equipment being ordered: Automatic Blood Pressure Cuff 1 Device 0     order for DME Equipment being ordered: gamekeeper/thumb spica splint 1 Device 0     triamcinolone (KENALOG) 0.1 % external cream Apply at bedtime for itching on arms and legs 80 g 3     Vitamin D3 (VITAMIN D3) 25 mcg (1000 units) tablet Take 1 tablet (25 mcg) by mouth daily 90 tablet 3     citalopram (CELEXA) 20 MG tablet Take 1 tablet (20 mg) by mouth daily 30 tablet 5     famotidine (PEPCID) 10 MG tablet Take 40 mg by mouth nightly as needed (Patient not taking: Reported on 10/12/2022)       famotidine (PEPCID) 40 MG tablet TAKE ONE (1) TABLET (40 MG) BY MOUTH AT BEDTIME (Patient not taking: Reported on 10/12/2022) 30 tablet 5     Hypertonic Nasal Wash (SINUS RINSE REFILL) PACK Use as directed (Patient not taking: Reported on 10/12/2022) 200 each 11     pantoprazole (PROTONIX) 20 MG EC tablet Take 40 mg by mouth daily (Patient not taking: Reported on 10/12/2022)       pantoprazole (PROTONIX) 40 MG EC tablet TAKE ONE (1) TABLET (40 MG) BY MOUTH DAILY (Patient not taking: Reported on 10/12/2022) 30 tablet 5     PREMARIN 0.3 MG tablet TAKE ONE (1) TABLET (0.3 MG) BY MOUTH TWICE A WEEK (Patient not taking: Reported on 10/12/2022) 24 tablet 1     progesterone (PROMETRIUM) 100 MG capsule TAKE ONE (1) CAPSULE (100 MG) BY MOUTH TWICE A WEEK (Patient not taking: Reported on 10/12/2022) 24 capsule 1       Allergies:  No Known Allergies    PHYSICAL EXAM:   Vital signs: /64 (BP Location: Right arm, Patient Position: Sitting, Cuff Size: Adult Regular)   Pulse 76   Temp 98  F (36.7  C) (Tympanic)   Ht 1.575 m (5' 2\")   Wt 68 kg (150 lb)   SpO2 97%   BMI 27.44 kg/m     BMI: Body mass index is 27.44 kg/m .   General: Normal, healthy, cooperative, in no acute distress, alert   Lungs: respirations are non-labored   Abdominal: " non-distended       PATHOLOGY:  A.  Duodenum, biopsy:  -Duodenal mucosa with no diagnostic abnormalities.     B.  Stomach, antrum, biopsy:  -Antral mucosa with no diagnostic abnormalities.     C.  Esophagus, distal, biopsy:  -Squamous mucosa with no diagnostic abnormalities.    ASSESSMENT:    67 year old female who is 2 weeks s/p Esophagogastroduodenoscopy with biopsy.  Excess sputum production    PLAN:   Referral to pulmonary medicine was made for the patient's excess sputum production.  She can follow up as needed with me at this time.      Recommend follow-up Esophagogastroduodenoscopy as needed.

## 2022-10-12 NOTE — NURSING NOTE
"Chief Complaint   Patient presents with     Surgical Followup     S/P Upper Endoscopy on 9/29/22       Initial /64 (BP Location: Right arm, Patient Position: Sitting, Cuff Size: Adult Regular)   Pulse 76   Temp 98  F (36.7  C) (Tympanic)   Ht 1.575 m (5' 2\")   Wt 68 kg (150 lb)   SpO2 97%   BMI 27.44 kg/m   Estimated body mass index is 27.44 kg/m  as calculated from the following:    Height as of this encounter: 1.575 m (5' 2\").    Weight as of this encounter: 68 kg (150 lb).  Medication Reconciliation: complete  REILLY FLORES LPN  "

## 2022-10-19 ENCOUNTER — TELEPHONE (OUTPATIENT)
Dept: PULMONOLOGY | Facility: CLINIC | Age: 67
End: 2022-10-19

## 2022-10-28 NOTE — TELEPHONE ENCOUNTER
RECORDS RECEIVED FROM: internal    DATE RECEIVED: 12/7/22   NOTES STATUS DETAILS   OFFICE NOTE from referring provider internal  Kanika Wright NP   OFFICE NOTE from other specialist     DISCHARGE SUMMARY from hospital     DISCHARGE REPORT from the ER     MEDICATION LIST internal     IMAGING  (NEED IMAGES AND REPORTS)     CT SCAN     CHEST XRAY (CXR) internal  Scheduled 12/7/22    TESTS     PULMONARY FUNCTION TESTING (PFT) internal  Scheduled 12/7/22

## 2022-11-10 DIAGNOSIS — R09.3 ABNORMAL SPUTUM AMOUNT: Primary | ICD-10-CM

## 2022-11-28 DIAGNOSIS — Z78.0 MENOPAUSE: ICD-10-CM

## 2022-11-28 DIAGNOSIS — I10 BENIGN ESSENTIAL HYPERTENSION: ICD-10-CM

## 2022-11-28 RX ORDER — HYDROCHLOROTHIAZIDE 25 MG/1
TABLET ORAL
Qty: 90 TABLET | Refills: 1 | Status: SHIPPED | OUTPATIENT
Start: 2022-11-28 | End: 2023-04-04 | Stop reason: SINTOL

## 2022-11-28 RX ORDER — CITALOPRAM HYDROBROMIDE 20 MG/1
TABLET ORAL
Qty: 90 TABLET | Refills: 1 | Status: SHIPPED | OUTPATIENT
Start: 2022-11-28 | End: 2023-06-01

## 2022-11-28 NOTE — TELEPHONE ENCOUNTER
citalopram      Last Written Prescription Date:  8/4/22  Last Fill Quantity: 30,   # refills: 5  Last Office Visit: 10/12/22  Future Office visit:    Next 5 appointments (look out 90 days)    Dec 16, 2022  2:00 PM  (Arrive by 1:45 PM)  SHORT with Carolyn Ritter MD  [facility] ([facility] ) [address]  [place] [address]  [phone]           Routing refill request to provider for review/approval because:      hydrochlorothiazide      Last Written Prescription Date:  12/7/21  Last Fill Quantity: 90,   # refills: 3  Last Office Visit: 10/12/22  Future Office visit:    Next 5 appointments (look out 90 days)    Dec 16, 2022  2:00 PM  (Arrive by 1:45 PM)  SHORT with Carolyn Ritter MD  [facility] ([facility] ) [address]  [place] [address]  [phone]           Routing refill request to provider for review/approval because:

## 2022-12-07 ENCOUNTER — OFFICE VISIT (OUTPATIENT)
Dept: PULMONOLOGY | Facility: CLINIC | Age: 67
End: 2022-12-07
Attending: DENTIST
Payer: COMMERCIAL

## 2022-12-07 ENCOUNTER — PRE VISIT (OUTPATIENT)
Dept: PULMONOLOGY | Facility: CLINIC | Age: 67
End: 2022-12-07

## 2022-12-07 ENCOUNTER — ANCILLARY PROCEDURE (OUTPATIENT)
Dept: GENERAL RADIOLOGY | Facility: CLINIC | Age: 67
End: 2022-12-07
Attending: DENTIST
Payer: COMMERCIAL

## 2022-12-07 VITALS
SYSTOLIC BLOOD PRESSURE: 148 MMHG | HEART RATE: 68 BPM | WEIGHT: 152 LBS | BODY MASS INDEX: 27.97 KG/M2 | HEIGHT: 62 IN | OXYGEN SATURATION: 98 % | DIASTOLIC BLOOD PRESSURE: 80 MMHG

## 2022-12-07 DIAGNOSIS — R09.3 ABNORMAL SPUTUM AMOUNT: Primary | ICD-10-CM

## 2022-12-07 DIAGNOSIS — R09.3 ABNORMAL SPUTUM AMOUNT: ICD-10-CM

## 2022-12-07 DIAGNOSIS — R09.3 EXCESSIVE SPUTUM: Primary | ICD-10-CM

## 2022-12-07 PROCEDURE — 94729 DIFFUSING CAPACITY: CPT | Performed by: INTERNAL MEDICINE

## 2022-12-07 PROCEDURE — 94726 PLETHYSMOGRAPHY LUNG VOLUMES: CPT | Performed by: INTERNAL MEDICINE

## 2022-12-07 PROCEDURE — 94375 RESPIRATORY FLOW VOLUME LOOP: CPT | Performed by: INTERNAL MEDICINE

## 2022-12-07 PROCEDURE — 99204 OFFICE O/P NEW MOD 45 MIN: CPT | Mod: 25 | Performed by: INTERNAL MEDICINE

## 2022-12-07 PROCEDURE — G0463 HOSPITAL OUTPT CLINIC VISIT: HCPCS | Mod: 25

## 2022-12-07 PROCEDURE — 71046 X-RAY EXAM CHEST 2 VIEWS: CPT | Mod: GC | Performed by: RADIOLOGY
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
## 2022-12-07 NOTE — PATIENT INSTRUCTIONS
Please send me a [employer] sciencet message when you have your CT Chest completed.   Will call you with the results and discuss next steps including if another appointment is needed.

## 2022-12-09 ENCOUNTER — HOSPITAL ENCOUNTER (OUTPATIENT)
Dept: CT IMAGING | Facility: HOSPITAL | Age: 67
Discharge: HOME | End: 2022-12-09
Attending: INTERNAL MEDICINE | Admitting: INTERNAL MEDICINE
Payer: MEDICARE

## 2022-12-09 DIAGNOSIS — R09.3 EXCESSIVE SPUTUM: ICD-10-CM

## 2022-12-09 LAB
DLCOUNC-%PRED-PRE: 112 %
DLCOUNC-PRE: 20.58 ML/MIN/MMHG
DLCOUNC-PRED: 18.37 ML/MIN/MMHG
ERV-%PRED-PRE: 36 %
ERV-PRE: 0.21 L
ERV-PRED: 0.56 L
EXPTIME-PRE: 7.23 SEC
FEF2575-%PRED-PRE: 85 %
FEF2575-PRE: 1.54 L/SEC
FEF2575-PRED: 1.8 L/SEC
FEFMAX-%PRED-PRE: 112 %
FEFMAX-PRE: 6.24 L/SEC
FEFMAX-PRED: 5.56 L/SEC
FEV1-%PRED-PRE: 91 %
FEV1-PRE: 1.82 L
FEV1FEV6-PRE: 78 %
FEV1FEV6-PRED: 80 %
FEV1FVC-PRE: 78 %
FEV1FVC-PRED: 80 %
FEV1SVC-PRE: 75 %
FEV1SVC-PRED: 70 %
FIFMAX-PRE: 4.88 L/SEC
FRCPLETH-%PRED-PRE: 73 %
FRCPLETH-PRE: 1.92 L
FRCPLETH-PRED: 2.59 L
FVC-%PRED-PRE: 92 %
FVC-PRE: 2.33 L
FVC-PRED: 2.52 L
IC-%PRED-PRE: 95 %
IC-PRE: 2.21 L
IC-PRED: 2.31 L
RVPLETH-%PRED-PRE: 89 %
RVPLETH-PRE: 1.71 L
RVPLETH-PRED: 1.92 L
TLCPLETH-%PRED-PRE: 89 %
TLCPLETH-PRE: 4.13 L
TLCPLETH-PRED: 4.6 L
VA-%PRED-PRE: 83 %
VA-PRE: 3.64 L
VC-%PRED-PRE: 84 %
VC-PRE: 2.42 L
VC-PRED: 2.87 L

## 2022-12-09 PROCEDURE — 71250 CT THORAX DX C-: CPT | Mod: MG

## 2022-12-09 PROCEDURE — G1010 CDSM STANSON: HCPCS

## 2022-12-15 NOTE — PROGRESS NOTES
"  Assessment & Plan     1. Chronic bilateral low back pain with right-sided sciatica  I think gabapentin is very reasonable to try.  Will start with a low dose at night and titrate up.  Other medications refilled.  PT referral made as well.  Follow-up in 3-4 weeks for review of symptoms, sooner as needed.  - gabapentin (NEURONTIN) 100 MG capsule; Take 1 capsule (100 mg) by mouth At Bedtime for 7 days, THEN 2 capsules (200 mg) At Bedtime for 7 days, THEN 3 capsules (300 mg) At Bedtime for 14 days.  Dispense: 63 capsule; Refill: 0  - Physical Therapy Referral; Future  - baclofen (LIORESAL) 10 MG tablet; Take 1 tablet (10 mg) by mouth 3 times daily as needed for muscle spasms  Dispense: 90 tablet; Refill: 1  - HYDROcodone-acetaminophen (NORCO) 7.5-325 MG per tablet; Take 1 tablet by mouth every 6 hours as needed for moderate to severe pain  Dispense: 30 tablet; Refill: 0    2. Sciatica of right side  - gabapentin (NEURONTIN) 100 MG capsule; Take 1 capsule (100 mg) by mouth At Bedtime for 7 days, THEN 2 capsules (200 mg) At Bedtime for 7 days, THEN 3 capsules (300 mg) At Bedtime for 14 days.  Dispense: 63 capsule; Refill: 0  - Physical Therapy Referral; Future  - HYDROcodone-acetaminophen (NORCO) 7.5-325 MG per tablet; Take 1 tablet by mouth every 6 hours as needed for moderate to severe pain  Dispense: 30 tablet; Refill: 0        BMI:   Estimated body mass index is 27.58 kg/m  as calculated from the following:    Height as of this encounter: 1.575 m (5' 2\").    Weight as of this encounter: 68.4 kg (150 lb 12.8 oz).     Return in about 3 weeks (around 1/6/2023) for Medication review, Follow-up.    Carolyn Ritter MD  [facility] RENEA Gonzalez is a 67 year old presenting for the following health issues:  Musculoskeletal Problem      HPI     Chronic/Recurring Back Pain Follow Up      Where is your back pain located? (Select all that apply) low back right    How would you describe your " "back pain?  sharp, shooting and stabbing    Where does your back pain spread? the right buttock, the right  thigh and the right shoulder    Since your last clinic visit for back pain, how has your pain changed? always present, but gets better and worse    Does your back pain interfere with your job? Not applicable    Since your last visit, have you tried any new treatment? No     Patient states she overall isn't getting much relief from her medications, except hydrocodone which she doesn't want to take very much.  She notes her sister mentioned gabapentin, and she wonders about the utility of that medication possibly.        Review of Systems   Constitutional, HEENT, cardiovascular, pulmonary, gi and gu systems are negative, except as otherwise noted.      Objective    /70 (BP Location: Left arm, Patient Position: Sitting, Cuff Size: Adult Regular)   Pulse 63   Temp 97.2  F (36.2  C) (Tympanic)   Resp 16   Ht 1.575 m (5' 2\")   Wt 68.4 kg (150 lb 12.8 oz)   SpO2 97%   BMI 27.58 kg/m    Body mass index is 27.58 kg/m .  Physical Exam   GENERAL: healthy, alert and no distress  PSYCH: mentation appears normal, affect normal/bright                "

## 2022-12-16 ENCOUNTER — OFFICE VISIT (OUTPATIENT)
Dept: FAMILY MEDICINE | Facility: OTHER | Age: 67
End: 2022-12-16
Attending: FAMILY MEDICINE
Payer: COMMERCIAL

## 2022-12-16 VITALS
RESPIRATION RATE: 16 BRPM | WEIGHT: 150.8 LBS | OXYGEN SATURATION: 97 % | SYSTOLIC BLOOD PRESSURE: 116 MMHG | DIASTOLIC BLOOD PRESSURE: 70 MMHG | HEIGHT: 62 IN | BODY MASS INDEX: 27.75 KG/M2 | TEMPERATURE: 97.2 F | HEART RATE: 63 BPM

## 2022-12-16 DIAGNOSIS — M54.41 CHRONIC BILATERAL LOW BACK PAIN WITH RIGHT-SIDED SCIATICA: Primary | ICD-10-CM

## 2022-12-16 DIAGNOSIS — G89.29 CHRONIC BILATERAL LOW BACK PAIN WITH RIGHT-SIDED SCIATICA: Primary | ICD-10-CM

## 2022-12-16 DIAGNOSIS — M54.31 SCIATICA OF RIGHT SIDE: ICD-10-CM

## 2022-12-16 PROCEDURE — 99214 OFFICE O/P EST MOD 30 MIN: CPT | Performed by: FAMILY MEDICINE

## 2022-12-16 PROCEDURE — G0463 HOSPITAL OUTPT CLINIC VISIT: HCPCS

## 2022-12-16 RX ORDER — HYDROCODONE BITARTRATE AND ACETAMINOPHEN 7.5; 325 MG/1; MG/1
1 TABLET ORAL EVERY 6 HOURS PRN
Qty: 30 TABLET | Refills: 0 | Status: SHIPPED | OUTPATIENT
Start: 2022-12-16 | End: 2023-11-15

## 2022-12-16 RX ORDER — BACLOFEN 10 MG/1
10 TABLET ORAL 3 TIMES DAILY PRN
Qty: 90 TABLET | Refills: 1 | Status: SHIPPED | OUTPATIENT
Start: 2022-12-16 | End: 2023-08-01

## 2022-12-16 RX ORDER — GABAPENTIN 100 MG/1
CAPSULE ORAL
Qty: 63 CAPSULE | Refills: 0 | Status: SHIPPED | OUTPATIENT
Start: 2022-12-16 | End: 2023-01-06

## 2022-12-16 ASSESSMENT — PAIN SCALES - GENERAL: PAINLEVEL: MODERATE PAIN (5)

## 2022-12-27 ENCOUNTER — HOSPITAL ENCOUNTER (OUTPATIENT)
Dept: PHYSICAL THERAPY | Facility: OTHER | Age: 67
Discharge: HOME | End: 2022-12-27
Attending: FAMILY MEDICINE | Admitting: FAMILY MEDICINE
Payer: MEDICARE

## 2022-12-27 DIAGNOSIS — G89.29 CHRONIC BILATERAL LOW BACK PAIN WITH RIGHT-SIDED SCIATICA: ICD-10-CM

## 2022-12-27 DIAGNOSIS — M54.31 SCIATICA OF RIGHT SIDE: ICD-10-CM

## 2022-12-27 DIAGNOSIS — M54.41 CHRONIC BILATERAL LOW BACK PAIN WITH RIGHT-SIDED SCIATICA: ICD-10-CM

## 2022-12-27 PROCEDURE — 97110 THERAPEUTIC EXERCISES: CPT | Mod: GP

## 2022-12-27 PROCEDURE — 97535 SELF CARE MNGMENT TRAINING: CPT | Mod: GP

## 2022-12-27 PROCEDURE — 97161 PT EVAL LOW COMPLEX 20 MIN: CPT | Mod: GP
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
## 2023-01-03 ENCOUNTER — HOSPITAL ENCOUNTER (OUTPATIENT)
Dept: PHYSICAL THERAPY | Facility: OTHER | Age: 68
Discharge: HOME | End: 2023-01-03
Attending: FAMILY MEDICINE | Admitting: FAMILY MEDICINE
Payer: MEDICARE

## 2023-01-03 PROCEDURE — 97140 MANUAL THERAPY 1/> REGIONS: CPT | Mod: GP

## 2023-01-03 PROCEDURE — 97110 THERAPEUTIC EXERCISES: CPT | Mod: GP

## 2023-01-05 NOTE — PROGRESS NOTES
"  Assessment & Plan     1. Chronic bilateral low back pain with right-sided sciatica  Patient does feel medication is helping, will increase dose slightly.  We did discuss possibly adding daytime dose, patient does not feel she needs this yet.  Follow-up in about 3 months for medication review, sooner as needed.  - gabapentin (NEURONTIN) 100 MG capsule; Take 2 capsules (200 mg) by mouth At Bedtime  Dispense: 60 capsule; Refill: 2    2. Sciatica of right side  - gabapentin (NEURONTIN) 100 MG capsule; Take 2 capsules (200 mg) by mouth At Bedtime  Dispense: 60 capsule; Refill: 2        BMI:   Estimated body mass index is 27.4 kg/m  as calculated from the following:    Height as of this encounter: 1.575 m (5' 2\").    Weight as of this encounter: 67.9 kg (149 lb 12.8 oz).       Return in about 3 months (around 4/6/2023) for Medication review.    Carolyn Ritter MD  [facility] Gonzalez is a 67 year old presenting for the following health issues:  Musculoskeletal Problem      HPI     Pain History:  When did you first notice your pain? - Chronic Pain   Have you seen this provider for your pain in the past?   Yes   Where in your body do you have pain? back  Are you seeing anyone else for your pain? No        Chronic Pain Follow Up:    Location of pain: low back  Analgesia/pain control:    - Recent changes:  No change    - Overall control: Tolerable with discomfort    - Current treatments: gabapentin, norco, baclofen  Adherence:     - Do you ever take more pain medicine than prescribed? No    - When did you take your last dose of pain medicine?  Wednesday   Adverse effects: No   PDMP Review     None        Last CSA Agreement:   CSA -- Patient Level:    CSA: None found at the patient level.       Last UDS:       Review of Systems   Constitutional, HEENT, cardiovascular, pulmonary, gi and gu systems are negative, except as otherwise noted.      Objective    /68 (BP Location: Right " "arm, Patient Position: Sitting, Cuff Size: Adult Regular)   Pulse 71   Temp 97.8  F (36.6  C) (Tympanic)   Resp 16   Ht 1.575 m (5' 2\")   Wt 67.9 kg (149 lb 12.8 oz)   SpO2 96%   BMI 27.40 kg/m    Body mass index is 27.4 kg/m .  Physical Exam   GENERAL: healthy, alert and no distress  PSYCH: mentation appears normal, affect normal/bright                "

## 2023-01-06 ENCOUNTER — HOSPITAL ENCOUNTER (OUTPATIENT)
Dept: PHYSICAL THERAPY | Facility: OTHER | Age: 68
Discharge: HOME | End: 2023-01-06
Attending: FAMILY MEDICINE
Payer: MEDICARE

## 2023-01-06 ENCOUNTER — OFFICE VISIT (OUTPATIENT)
Dept: FAMILY MEDICINE | Facility: OTHER | Age: 68
End: 2023-01-06
Attending: FAMILY MEDICINE
Payer: MEDICARE

## 2023-01-06 VITALS
BODY MASS INDEX: 27.57 KG/M2 | WEIGHT: 149.8 LBS | DIASTOLIC BLOOD PRESSURE: 68 MMHG | HEART RATE: 71 BPM | OXYGEN SATURATION: 96 % | TEMPERATURE: 97.8 F | RESPIRATION RATE: 16 BRPM | SYSTOLIC BLOOD PRESSURE: 138 MMHG | HEIGHT: 62 IN

## 2023-01-06 DIAGNOSIS — M54.31 SCIATICA OF RIGHT SIDE: ICD-10-CM

## 2023-01-06 DIAGNOSIS — G89.29 CHRONIC BILATERAL LOW BACK PAIN WITH RIGHT-SIDED SCIATICA: Primary | ICD-10-CM

## 2023-01-06 DIAGNOSIS — M54.41 CHRONIC BILATERAL LOW BACK PAIN WITH RIGHT-SIDED SCIATICA: Primary | ICD-10-CM

## 2023-01-06 PROCEDURE — 97140 MANUAL THERAPY 1/> REGIONS: CPT | Mod: GP

## 2023-01-06 PROCEDURE — 99214 OFFICE O/P EST MOD 30 MIN: CPT | Performed by: FAMILY MEDICINE

## 2023-01-06 PROCEDURE — 97110 THERAPEUTIC EXERCISES: CPT | Mod: GP

## 2023-01-06 PROCEDURE — G0463 HOSPITAL OUTPT CLINIC VISIT: HCPCS

## 2023-01-06 RX ORDER — GABAPENTIN 100 MG/1
200 CAPSULE ORAL AT BEDTIME
Qty: 60 CAPSULE | Refills: 2 | Status: SHIPPED | OUTPATIENT
Start: 2023-01-06 | End: 2023-04-24

## 2023-01-06 ASSESSMENT — PAIN SCALES - GENERAL: PAINLEVEL: MILD PAIN (3)

## 2023-01-10 ENCOUNTER — HOSPITAL ENCOUNTER (OUTPATIENT)
Dept: PHYSICAL THERAPY | Facility: OTHER | Age: 68
Discharge: HOME | End: 2023-01-10
Attending: FAMILY MEDICINE | Admitting: FAMILY MEDICINE
Payer: MEDICARE

## 2023-01-10 PROCEDURE — 97140 MANUAL THERAPY 1/> REGIONS: CPT | Mod: GP

## 2023-01-12 ENCOUNTER — HOSPITAL ENCOUNTER (OUTPATIENT)
Dept: PHYSICAL THERAPY | Facility: OTHER | Age: 68
Discharge: HOME | End: 2023-01-12
Attending: FAMILY MEDICINE | Admitting: FAMILY MEDICINE
Payer: MEDICARE

## 2023-01-12 PROCEDURE — 97140 MANUAL THERAPY 1/> REGIONS: CPT | Mod: GP

## 2023-01-17 ENCOUNTER — ALLIED HEALTH/NURSE VISIT (OUTPATIENT)
Dept: FAMILY MEDICINE | Facility: OTHER | Age: 68
End: 2023-01-17
Attending: FAMILY MEDICINE
Payer: MEDICARE

## 2023-01-17 ENCOUNTER — HOSPITAL ENCOUNTER (OUTPATIENT)
Dept: PHYSICAL THERAPY | Facility: OTHER | Age: 68
Discharge: HOME | End: 2023-01-17
Attending: FAMILY MEDICINE
Payer: MEDICARE

## 2023-01-17 DIAGNOSIS — G89.29 CHRONIC BILATERAL LOW BACK PAIN WITH RIGHT-SIDED SCIATICA: Primary | ICD-10-CM

## 2023-01-17 DIAGNOSIS — M54.41 CHRONIC BILATERAL LOW BACK PAIN WITH RIGHT-SIDED SCIATICA: Primary | ICD-10-CM

## 2023-01-17 PROCEDURE — 97140 MANUAL THERAPY 1/> REGIONS: CPT | Mod: GP

## 2023-01-17 NOTE — PROGRESS NOTES
Pt presents for nurse only apt inquiring about back injections that have previously been suggested in May of 2022. She no longer has work comp and medicare only - did not move forward with this in the past d/t work comp not covering/dropping coverage however would like to move forward with this at this time. MRI was completed 5/25 - view encounter summary to see suggested treatment. Will route to PCP to determine if another interventional radiology consult will need to be placed or what steps to take next. PCP nurse will notify pt of next steps - pt would like a [employer] message with details.

## 2023-01-18 ENCOUNTER — HOSPITAL ENCOUNTER (OUTPATIENT)
Facility: HOSPITAL | Age: 68
Discharge: HOME | End: 2023-01-18
Attending: RADIOLOGY | Admitting: RADIOLOGY
Payer: MEDICARE

## 2023-01-18 NOTE — PROGRESS NOTES
Pt notified of new referral - verbalized understanding that she may be called to schedule and additional consult as last one was completed last year.

## 2023-01-19 ENCOUNTER — HOSPITAL ENCOUNTER (OUTPATIENT)
Dept: PHYSICAL THERAPY | Facility: OTHER | Age: 68
Discharge: HOME | End: 2023-01-19
Attending: FAMILY MEDICINE | Admitting: FAMILY MEDICINE
Payer: MEDICARE

## 2023-01-19 PROCEDURE — 97140 MANUAL THERAPY 1/> REGIONS: CPT | Mod: GP

## 2023-01-24 ENCOUNTER — HOSPITAL ENCOUNTER (OUTPATIENT)
Dept: PHYSICAL THERAPY | Facility: OTHER | Age: 68
Discharge: HOME | End: 2023-01-24
Attending: FAMILY MEDICINE | Admitting: FAMILY MEDICINE
Payer: MEDICARE

## 2023-01-24 PROCEDURE — 97140 MANUAL THERAPY 1/> REGIONS: CPT | Mod: GP

## 2023-01-25 NOTE — TELEPHONE ENCOUNTER
12:07 PM    Reason for Call: Phone Call    Description: Patient called inquiring about the status of the PT referral. Please call patient back for further discussion.     Was an appointment offered for this call? No  If yes : Appointment type              Date    Preferred method for responding to this message: Telephone Call  What is your phone number ? [phone]    If we cannot reach you directly, may we leave a detailed response at the number you provided? Yes    Can this message wait until your PCP/provider returns, if available today? YES, Patient was advised provider is out today    Isatu Lind     Tarsorrhaphy Text: A tarsorrhaphy was performed using Frost sutures.

## 2023-02-14 ENCOUNTER — HOSPITAL ENCOUNTER (OUTPATIENT)
Dept: INTERVENTIONAL RADIOLOGY/VASCULAR | Facility: HOSPITAL | Age: 68
Discharge: HOME | End: 2023-02-14
Attending: FAMILY MEDICINE
Payer: MEDICARE

## 2023-02-14 DIAGNOSIS — M47.26 OTHER SPONDYLOSIS WITH RADICULOPATHY, LUMBAR REGION: ICD-10-CM

## 2023-02-14 PROCEDURE — 250N000011 HC RX IP 250 OP 636: Performed by: RADIOLOGY

## 2023-02-14 PROCEDURE — 62323 NJX INTERLAMINAR LMBR/SAC: CPT

## 2023-02-14 RX ORDER — IOPAMIDOL 612 MG/ML
15 INJECTION, SOLUTION INTRATHECAL ONCE
Status: COMPLETED | OUTPATIENT
Start: 2023-02-14 | End: 2023-02-14

## 2023-02-14 RX ORDER — DEXAMETHASONE SODIUM PHOSPHATE 10 MG/ML
10 INJECTION, SOLUTION INTRAMUSCULAR; INTRAVENOUS ONCE
Status: COMPLETED | OUTPATIENT
Start: 2023-02-14 | End: 2023-02-14

## 2023-02-14 RX ADMIN — IOPAMIDOL 6 ML: 612 INJECTION, SOLUTION INTRATHECAL at 11:52

## 2023-02-14 RX ADMIN — DEXAMETHASONE SODIUM PHOSPHATE 10 MG: 10 INJECTION INTRAMUSCULAR; INTRAVENOUS at 11:52

## 2023-02-14 NOTE — DISCHARGE INSTRUCTIONS
Cell number on file:    Telephone Information:   Mobile [phone]     Is it ok to leave a message at this number(s)? Yes    Dr. Granado completed your procedure on 2/14/2023.    Current Pain Level (0-10 Scale): 4/10  Post Pain Level (0-10):  1/10    Radiology Discharge instructions for Steroid Injection    Activity Level:     Do not do any heavy activity or exercise for 24 hours.   Do not drive for 4 hours after your injection.  Diet:   Return to your normal diet.  Medications:   If you have stopped taking your Aspirin, Coumadin/Warfarin, Ibuprofen, or any   other blood thinner for this procedure you may resume in the morning unless   your primary care provider has given you other instructions.    Diabetics may see an increase in blood sugar after steroid injections. If you are concerned about your blood sugar, please contact your family doctor.    Site Care:  Remove the bandage and bathe or shower the morning after the procedure.      This is a Pain Management procedure.  You will be contacted in two weeks for follow up.    Call your Primary Care Provider if you have the following (if your primary care provider is not available please seek emergency care):   Nausea with vomiting   Severe headache   Drowsiness or confusion   Redness or drainage at the injection or puncture site   Temperature over 101 degrees F   Other concerns   Worsening back pain   Stiff neck

## 2023-02-22 NOTE — PROGRESS NOTES
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    Services to be provided/Interventions:   Interventions will be to alleviate anxiety, alleviate depressed mood, increase coping skills, process losses, teach CBT skills, teach mindfulness skills and teach relaxation strategies.    Functional Impairment:   Activities of Daily Living - lack of motivation, energy, interest  Social / Relational - anxiety is affecting relationships and social events    Anticipated number of session: 6      MeasurableTreatment Goal(s) related to diagnosis / functional impairment(s)  Goal 1: Client will report a reduction in depressed mood 5 sessions out of 6.     Objective A (Client Action)                  Client will learn 5 CBT skills to use for reducing depressed mood.     Objective B  Client will report using at least 2 CBT skills 5 days out of 7.     Objective C  Client will learn and use mindfulness skills 5 days out of 7.     Goal 2: Client will report a decrease in anxiety 5 out of 6 sessions.     Objective A (Client Action)                  Client will learn 5 relaxation skills.     Objective B  Client will report using relaxations skills 5 days out of 7.    VILLA EduardoSW       Headache

## 2023-02-28 ENCOUNTER — TELEPHONE (OUTPATIENT)
Dept: INTERVENTIONAL RADIOLOGY/VASCULAR | Facility: HOSPITAL | Age: 68
End: 2023-02-28

## 2023-02-28 NOTE — TELEPHONE ENCOUNTER
CONSULT PATIENT  PAIN INJECTION POST CALL    Procedure: Epidural TL L2-3  Radiologist(s): Dr. Giancarlo Granado  Date of Procedure: 2/14/23    I responded to the patient's questions/concerns.    Relief of pain from this injection    A = 90%  A- = 85%  B = 80%  B- =75%  C = 70%  C- = 65%  D = 60%  D- = 50%  F = less than 50%      Would you say this injection has been beneficial? Yes, patient states her back is at 90% of relief.   If yes, for how long? She feels more of her pain now above her butt crack, right in the SI joint.    Was there one injection that worked better than the other? First injection    Where is the pain? Patient states that she feels the pain in her lower back below the waistline right above her butt crack. Hurts worse on the right side but it is affecting the left now (NEW PAIN)  Can you describe the pain? Dull, achy, burning (newer), shooting and stabbing  Does the pain radiate anywhere? Yes   If yes, where does it radiate and where does the pain stop? It travels on the right side only on the back of the leg where it usually stops at the knee. But sometimes it will go all the way down to the right foot (about 3 or 4 times a week and tpically at night). She also notices that her sciatica will act really bad it will make her whole body spasm. That only happens a couple times every couple of weeks.    Is this new pain? Yes: She never use to get pain on her left side in the lower back below the waistline. It started about a week ago.    Patient would like to pursue another injection.      Caroline Yeboah

## 2023-03-04 ENCOUNTER — APPOINTMENT (OUTPATIENT)
Dept: CT IMAGING | Facility: HOSPITAL | Age: 68
End: 2023-03-04
Attending: PHYSICIAN ASSISTANT
Payer: MEDICARE

## 2023-03-04 ENCOUNTER — APPOINTMENT (OUTPATIENT)
Dept: GENERAL RADIOLOGY | Facility: HOSPITAL | Age: 68
End: 2023-03-04
Attending: PHYSICIAN ASSISTANT
Payer: MEDICARE

## 2023-03-04 ENCOUNTER — HOSPITAL ENCOUNTER (EMERGENCY)
Facility: HOSPITAL | Age: 68
Discharge: HOME | End: 2023-03-04
Attending: PHYSICIAN ASSISTANT | Admitting: PHYSICIAN ASSISTANT
Payer: MEDICARE

## 2023-03-04 VITALS
BODY MASS INDEX: 27.44 KG/M2 | SYSTOLIC BLOOD PRESSURE: 140 MMHG | RESPIRATION RATE: 18 BRPM | HEART RATE: 74 BPM | WEIGHT: 150 LBS | DIASTOLIC BLOOD PRESSURE: 56 MMHG | TEMPERATURE: 98 F | OXYGEN SATURATION: 97 %

## 2023-03-04 DIAGNOSIS — S09.90XA CLOSED HEAD INJURY, INITIAL ENCOUNTER: ICD-10-CM

## 2023-03-04 DIAGNOSIS — E87.6 HYPOKALEMIA: ICD-10-CM

## 2023-03-04 DIAGNOSIS — R55 SYNCOPE: Primary | ICD-10-CM

## 2023-03-04 DIAGNOSIS — S30.0XXA CONTUSION OF SACRUM, INITIAL ENCOUNTER: ICD-10-CM

## 2023-03-04 LAB
ALBUMIN SERPL BCG-MCNC: 4.4 G/DL (ref 3.5–5.2)
ALBUMIN UR-MCNC: NEGATIVE MG/DL
ALP SERPL-CCNC: 82 U/L (ref 35–104)
ALT SERPL W P-5'-P-CCNC: 23 U/L (ref 10–35)
ANION GAP SERPL CALCULATED.3IONS-SCNC: 10 MMOL/L (ref 7–15)
APPEARANCE UR: CLEAR
AST SERPL W P-5'-P-CCNC: 22 U/L (ref 10–35)
BASOPHILS # BLD AUTO: 0.1 10E3/UL (ref 0–0.2)
BASOPHILS NFR BLD AUTO: 1 %
BILIRUB SERPL-MCNC: 0.5 MG/DL
BILIRUB UR QL STRIP: NEGATIVE
BUN SERPL-MCNC: 14.5 MG/DL (ref 8–23)
CALCIUM SERPL-MCNC: 10.2 MG/DL (ref 8.8–10.2)
CHLORIDE SERPL-SCNC: 98 MMOL/L (ref 98–107)
COLOR UR AUTO: ABNORMAL
CREAT SERPL-MCNC: 0.67 MG/DL (ref 0.51–0.95)
DEPRECATED HCO3 PLAS-SCNC: 32 MMOL/L (ref 22–29)
EOSINOPHIL # BLD AUTO: 0.1 10E3/UL (ref 0–0.7)
EOSINOPHIL NFR BLD AUTO: 2 %
ERYTHROCYTE [DISTWIDTH] IN BLOOD BY AUTOMATED COUNT: 12.8 % (ref 10–15)
GFR SERPL CREATININE-BSD FRML MDRD: >90 ML/MIN/1.73M2
GLUCOSE SERPL-MCNC: 99 MG/DL (ref 70–99)
GLUCOSE UR STRIP-MCNC: NEGATIVE MG/DL
HCT VFR BLD AUTO: 45.1 % (ref 35–47)
HGB BLD-MCNC: 15.9 G/DL (ref 11.7–15.7)
HGB UR QL STRIP: NEGATIVE
IMM GRANULOCYTES # BLD: 0 10E3/UL
IMM GRANULOCYTES NFR BLD: 0 %
KETONES UR STRIP-MCNC: NEGATIVE MG/DL
LEUKOCYTE ESTERASE UR QL STRIP: NEGATIVE
LYMPHOCYTES # BLD AUTO: 1.5 10E3/UL (ref 0.8–5.3)
LYMPHOCYTES NFR BLD AUTO: 18 %
MAGNESIUM SERPL-MCNC: 1.9 MG/DL (ref 1.7–2.3)
MCH RBC QN AUTO: 32.9 PG (ref 26.5–33)
MCHC RBC AUTO-ENTMCNC: 35.3 G/DL (ref 31.5–36.5)
MCV RBC AUTO: 93 FL (ref 78–100)
MONOCYTES # BLD AUTO: 0.7 10E3/UL (ref 0–1.3)
MONOCYTES NFR BLD AUTO: 8 %
MUCOUS THREADS #/AREA URNS LPF: PRESENT /LPF
NEUTROPHILS # BLD AUTO: 6 10E3/UL (ref 1.6–8.3)
NEUTROPHILS NFR BLD AUTO: 71 %
NITRATE UR QL: NEGATIVE
NRBC # BLD AUTO: 0 10E3/UL
NRBC BLD AUTO-RTO: 0 /100
PH UR STRIP: 5.5 [PH] (ref 4.7–8)
PLATELET # BLD AUTO: 339 10E3/UL (ref 150–450)
POTASSIUM SERPL-SCNC: 3.3 MMOL/L (ref 3.4–5.3)
PROT SERPL-MCNC: 7.3 G/DL (ref 6.4–8.3)
RBC # BLD AUTO: 4.83 10E6/UL (ref 3.8–5.2)
RBC URINE: 1 /HPF
SODIUM SERPL-SCNC: 140 MMOL/L (ref 136–145)
SP GR UR STRIP: 1.01 (ref 1–1.03)
SQUAMOUS EPITHELIAL: 0 /HPF
TROPONIN T SERPL HS-MCNC: <6 NG/L
UROBILINOGEN UR STRIP-MCNC: NORMAL MG/DL
WBC # BLD AUTO: 8.4 10E3/UL (ref 4–11)
WBC URINE: 4 /HPF

## 2023-03-04 PROCEDURE — 83735 ASSAY OF MAGNESIUM: CPT | Performed by: PHYSICIAN ASSISTANT

## 2023-03-04 PROCEDURE — 72170 X-RAY EXAM OF PELVIS: CPT

## 2023-03-04 PROCEDURE — 70450 CT HEAD/BRAIN W/O DYE: CPT | Mod: MA

## 2023-03-04 PROCEDURE — 36415 COLL VENOUS BLD VENIPUNCTURE: CPT | Performed by: PHYSICIAN ASSISTANT

## 2023-03-04 PROCEDURE — 80053 COMPREHEN METABOLIC PANEL: CPT | Performed by: PHYSICIAN ASSISTANT

## 2023-03-04 PROCEDURE — 93010 ELECTROCARDIOGRAM REPORT: CPT | Performed by: INTERNAL MEDICINE

## 2023-03-04 PROCEDURE — 99285 EMERGENCY DEPT VISIT HI MDM: CPT | Mod: 25

## 2023-03-04 PROCEDURE — 85048 AUTOMATED LEUKOCYTE COUNT: CPT | Performed by: PHYSICIAN ASSISTANT

## 2023-03-04 PROCEDURE — 99284 EMERGENCY DEPT VISIT MOD MDM: CPT | Performed by: PHYSICIAN ASSISTANT

## 2023-03-04 PROCEDURE — 85014 HEMATOCRIT: CPT | Performed by: PHYSICIAN ASSISTANT

## 2023-03-04 PROCEDURE — 81001 URINALYSIS AUTO W/SCOPE: CPT | Performed by: PHYSICIAN ASSISTANT

## 2023-03-04 PROCEDURE — 93005 ELECTROCARDIOGRAM TRACING: CPT

## 2023-03-04 PROCEDURE — 84484 ASSAY OF TROPONIN QUANT: CPT | Performed by: PHYSICIAN ASSISTANT

## 2023-03-04 ASSESSMENT — ENCOUNTER SYMPTOMS
WEAKNESS: 1
FEVER: 0
ABDOMINAL PAIN: 0
BACK PAIN: 1
SHORTNESS OF BREATH: 0
CHEST TIGHTNESS: 0
NUMBNESS: 0
HEADACHES: 1
DIZZINESS: 0
LIGHT-HEADEDNESS: 0
FATIGUE: 0

## 2023-03-04 ASSESSMENT — ACTIVITIES OF DAILY LIVING (ADL): ADLS_ACUITY_SCORE: 35

## 2023-03-04 NOTE — ED TRIAGE NOTES
"\"Yesterday in [place] at the [facility].  I blacked out, fainted, and fell to the floor.  I do not know how long I was blacked out.  I was unable to get up off the floor by myself.  I am having pain in the back of my head and pain in my tailbone area and lower back.  I am having generalized weakness.  I had 5 cans of beer from 2pm-7pm.  I did not eat much the whole day.  I did not have any generalized weakness before the fall.\"       "

## 2023-03-04 NOTE — DISCHARGE INSTRUCTIONS
I have placed outpatient orders for an echocardiogram, ultrasound of your carotid arteries, and Zio patch.  This will rule in or rule out issues with cardiac anatomy, vessel stenosis leading to your brain, and rhythm disturbances.  Please follow-up with your primary care physician this week for recheck.  Rest and stay hydrated.  Return here for chest pain, shortness of breath, or other questions or concerns.
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
## 2023-03-13 ENCOUNTER — HOSPITAL ENCOUNTER (OUTPATIENT)
Dept: CARDIOLOGY | Facility: CLINIC | Age: 68
Discharge: HOME | End: 2023-03-13
Attending: PHYSICIAN ASSISTANT | Admitting: PHYSICIAN ASSISTANT
Payer: MEDICARE

## 2023-03-13 DIAGNOSIS — R55 SYNCOPE: ICD-10-CM

## 2023-03-13 PROCEDURE — 93244 EXT ECG>48HR<7D REV&INTERPJ: CPT | Performed by: INTERNAL MEDICINE

## 2023-03-13 PROCEDURE — 93242 EXT ECG>48HR<7D RECORDING: CPT

## 2023-03-17 ENCOUNTER — HOSPITAL ENCOUNTER (OUTPATIENT)
Dept: CARDIOLOGY | Facility: CLINIC | Age: 68
Discharge: HOME | End: 2023-03-17
Attending: PHYSICIAN ASSISTANT
Payer: MEDICARE

## 2023-03-17 ENCOUNTER — HOSPITAL ENCOUNTER (OUTPATIENT)
Dept: ULTRASOUND IMAGING | Facility: CLINIC | Age: 68
Discharge: HOME | End: 2023-03-17
Attending: PHYSICIAN ASSISTANT
Payer: MEDICARE

## 2023-03-17 DIAGNOSIS — R55 SYNCOPE: ICD-10-CM

## 2023-03-17 LAB — LVEF ECHO: NORMAL

## 2023-03-17 PROCEDURE — 93306 TTE W/DOPPLER COMPLETE: CPT | Mod: 26 | Performed by: INTERNAL MEDICINE

## 2023-03-17 PROCEDURE — 93880 EXTRACRANIAL BILAT STUDY: CPT | Mod: 26 | Performed by: INTERNAL MEDICINE

## 2023-03-17 PROCEDURE — 93306 TTE W/DOPPLER COMPLETE: CPT

## 2023-03-17 PROCEDURE — 93880 EXTRACRANIAL BILAT STUDY: CPT

## 2023-03-31 ENCOUNTER — TELEPHONE (OUTPATIENT)
Dept: FAMILY MEDICINE | Facility: OTHER | Age: 68
End: 2023-03-31

## 2023-03-31 NOTE — TELEPHONE ENCOUNTER
11:52 AM    Reason for Call: Phone Call    Description: pt would like to talk to someone from Dr. Duff care team about her restless legs, Please call pt. Was on hold with triage for over 30 minutes    Was an appointment offered for this call? Yes  If yes : Appointment type              Date    Preferred method for responding to this message: Telephone Call  What is your phone number ? [phone]    If we cannot reach you directly, may we leave a detailed response at the number you provided? Yes    Can this message wait until your PCP/provider returns, if available today? Erma Stokes

## 2023-03-31 NOTE — TELEPHONE ENCOUNTER
Gabapentin is not working at all, 2 hours later she takes a baclofen and still no relief.  Pt states she is having spasms in both legs.  Pt wants to know all things that would needed to be stopped before she is able to get shots to help with this?  Pt did have the spinal injection but she seems to be having what she explains as a bubble popping when she moves in her spine and she states it feels like someone is stabbing her and it takes her breath away.  Appointment?

## 2023-04-04 ENCOUNTER — OFFICE VISIT (OUTPATIENT)
Dept: FAMILY MEDICINE | Facility: OTHER | Age: 68
End: 2023-04-04
Attending: FAMILY MEDICINE
Payer: COMMERCIAL

## 2023-04-04 VITALS
HEIGHT: 62 IN | TEMPERATURE: 97.3 F | OXYGEN SATURATION: 98 % | SYSTOLIC BLOOD PRESSURE: 124 MMHG | BODY MASS INDEX: 27.6 KG/M2 | WEIGHT: 150 LBS | RESPIRATION RATE: 18 BRPM | HEART RATE: 71 BPM | DIASTOLIC BLOOD PRESSURE: 70 MMHG

## 2023-04-04 DIAGNOSIS — I10 BENIGN ESSENTIAL HYPERTENSION: ICD-10-CM

## 2023-04-04 DIAGNOSIS — M79.641 PAIN OF RIGHT HAND: ICD-10-CM

## 2023-04-04 DIAGNOSIS — M62.838 MUSCLE SPASM: Primary | ICD-10-CM

## 2023-04-04 PROCEDURE — 99214 OFFICE O/P EST MOD 30 MIN: CPT | Performed by: FAMILY MEDICINE

## 2023-04-04 PROCEDURE — G0463 HOSPITAL OUTPT CLINIC VISIT: HCPCS

## 2023-04-04 RX ORDER — LISINOPRIL 20 MG/1
20 TABLET ORAL DAILY
Qty: 30 TABLET | Refills: 2 | Status: SHIPPED | OUTPATIENT
Start: 2023-04-04 | End: 2023-06-01

## 2023-04-04 ASSESSMENT — PAIN SCALES - GENERAL: PAINLEVEL: NO PAIN (1)

## 2023-04-04 NOTE — PROGRESS NOTES
"  Assessment & Plan     1. Muscle spasm  Patient does feel that change in activity level might be contributing.  I do think this could be medication side effect as well.  See medication recommendation below.    2. Benign essential hypertension  Will stop hydrochlorothiazide and start lisinopril.  Follow-up in a few weeks to recheck BP  - lisinopril (ZESTRIL) 20 MG tablet; Take 1 tablet (20 mg) by mouth daily  Dispense: 30 tablet; Refill: 2    3. Pain of right hand  Referral ordered  - Orthopedic  Referral; Future       BMI:   Estimated body mass index is 27.44 kg/m  as calculated from the following:    Height as of this encounter: 1.575 m (5' 2\").    Weight as of this encounter: 68 kg (150 lb).     Return in about 3 weeks (around 4/25/2023) for Follow-up.    Carolyn Ritter MD  [facility]      Rodrigue Gonzalez is a 68 year old, presenting for the following health issues:  Musculoskeletal Problem      HPI     Concern - Leg spasms  Onset: a year  Description: leg spasms  Intensity: moderate  Progression of Symptoms:  worsening  Accompanying Signs & Symptoms: mostly at night, wake her up in the middle of the night and early in the am  Previous history of similar problem: yes  Precipitating factors:        Worsened by: none  Alleviating factors:        Improved by: none  Therapies tried and outcome: gabapentin and baclofen    Patient does bring up pain in her hand at the end of the appointment.    Review of Systems   Constitutional, HEENT, cardiovascular, pulmonary, gi and gu systems are negative, except as otherwise noted.      Objective    /70 (BP Location: Left arm, Patient Position: Sitting, Cuff Size: Adult Regular)   Pulse 71   Temp 97.3  F (36.3  C) (Tympanic)   Resp 18   Ht 1.575 m (5' 2\")   Wt 68 kg (150 lb)   SpO2 98%   BMI 27.44 kg/m    Body mass index is 27.44 kg/m .  Physical Exam   GENERAL: healthy, alert and no distress  PSYCH: mentation appears normal, " affect normal/bright

## 2023-04-21 DIAGNOSIS — M54.31 SCIATICA OF RIGHT SIDE: ICD-10-CM

## 2023-04-21 DIAGNOSIS — M54.41 CHRONIC BILATERAL LOW BACK PAIN WITH RIGHT-SIDED SCIATICA: ICD-10-CM

## 2023-04-21 DIAGNOSIS — G89.29 CHRONIC BILATERAL LOW BACK PAIN WITH RIGHT-SIDED SCIATICA: ICD-10-CM

## 2023-04-24 RX ORDER — GABAPENTIN 100 MG/1
CAPSULE ORAL
Qty: 180 CAPSULE | Refills: 0 | Status: SHIPPED | OUTPATIENT
Start: 2023-04-24 | End: 2023-07-06

## 2023-04-24 NOTE — TELEPHONE ENCOUNTER
Gabapentin - insurance requests 90 day supply, which is pended      Last Written Prescription Date:  2.17.23  Last Fill Quantity: #60,   # refills: 0  Last Office Visit: 4.4.23  Future Office visit:       Routing refill request to provider for review/approval because:  Drug not on the [facility], [facility] or [facility] refill protocol or controlled substance

## 2023-04-28 ENCOUNTER — TRANSFERRED RECORDS (OUTPATIENT)
Dept: HEALTH INFORMATION MANAGEMENT | Facility: CLINIC | Age: 68
End: 2023-04-28

## 2023-05-23 ENCOUNTER — ANCILLARY PROCEDURE (OUTPATIENT)
Dept: MAMMOGRAPHY | Facility: OTHER | Age: 68
End: 2023-05-23
Attending: FAMILY MEDICINE
Payer: MEDICARE

## 2023-05-23 DIAGNOSIS — Z12.31 VISIT FOR SCREENING MAMMOGRAM: ICD-10-CM

## 2023-05-23 PROCEDURE — 77067 SCR MAMMO BI INCL CAD: CPT | Mod: TC

## 2023-05-24 ENCOUNTER — TELEPHONE (OUTPATIENT)
Dept: MAMMOGRAPHY | Facility: OTHER | Age: 68
End: 2023-05-24

## 2023-05-30 DIAGNOSIS — Z78.0 MENOPAUSE: ICD-10-CM

## 2023-05-30 DIAGNOSIS — M81.0 AGE-RELATED OSTEOPOROSIS WITHOUT CURRENT PATHOLOGICAL FRACTURE: ICD-10-CM

## 2023-05-30 DIAGNOSIS — I10 BENIGN ESSENTIAL HYPERTENSION: ICD-10-CM

## 2023-06-01 RX ORDER — IBANDRONATE SODIUM 150 MG/1
TABLET, FILM COATED ORAL
Qty: 3 TABLET | Refills: 1 | Status: SHIPPED | OUTPATIENT
Start: 2023-06-01 | End: 2024-01-04

## 2023-06-01 RX ORDER — CITALOPRAM HYDROBROMIDE 20 MG/1
TABLET ORAL
Qty: 90 TABLET | Refills: 1 | Status: SHIPPED | OUTPATIENT
Start: 2023-06-01 | End: 2023-11-21

## 2023-06-01 RX ORDER — LISINOPRIL 20 MG/1
TABLET ORAL
Qty: 90 TABLET | Refills: 1 | Status: SHIPPED | OUTPATIENT
Start: 2023-06-01 | End: 2023-11-21

## 2023-06-01 NOTE — TELEPHONE ENCOUNTER
lisinopril      Last Written Prescription Date:  4/4/23  Last Fill Quantity: 30,   # refills: 2  Last Office Visit: 4/4/23  Future Office visit:    Next 5 appointments (look out 90 days)    Jun 06, 2023 11:30 AM  (Arrive by 11:15 AM)  Office Visit with Carolyn Ritter MD  [facility] ([facility] ) [address]  [place] [address]  [phone]           boniva 9/19/22 # 3 with2  Citalopram 11/28/22 #90 with 1

## 2023-06-03 ENCOUNTER — HEALTH MAINTENANCE LETTER (OUTPATIENT)
Age: 68
End: 2023-06-03

## 2023-06-06 ENCOUNTER — OFFICE VISIT (OUTPATIENT)
Dept: FAMILY MEDICINE | Facility: OTHER | Age: 68
End: 2023-06-06
Attending: FAMILY MEDICINE
Payer: COMMERCIAL

## 2023-06-06 VITALS
SYSTOLIC BLOOD PRESSURE: 110 MMHG | TEMPERATURE: 97.6 F | RESPIRATION RATE: 18 BRPM | DIASTOLIC BLOOD PRESSURE: 62 MMHG | WEIGHT: 146 LBS | BODY MASS INDEX: 27.56 KG/M2 | HEART RATE: 63 BPM | OXYGEN SATURATION: 99 % | HEIGHT: 61 IN

## 2023-06-06 DIAGNOSIS — E78.5 HYPERLIPIDEMIA, UNSPECIFIED HYPERLIPIDEMIA TYPE: ICD-10-CM

## 2023-06-06 DIAGNOSIS — Z00.00 ENCOUNTER FOR MEDICARE ANNUAL WELLNESS EXAM: Primary | ICD-10-CM

## 2023-06-06 DIAGNOSIS — I10 BENIGN ESSENTIAL HYPERTENSION: ICD-10-CM

## 2023-06-06 LAB
ANION GAP SERPL CALCULATED.3IONS-SCNC: 12 MMOL/L (ref 7–15)
BUN SERPL-MCNC: 13.8 MG/DL (ref 8–23)
CALCIUM SERPL-MCNC: 10.5 MG/DL (ref 8.8–10.2)
CHLORIDE SERPL-SCNC: 101 MMOL/L (ref 98–107)
CHOLEST SERPL-MCNC: 257 MG/DL
CREAT SERPL-MCNC: 0.69 MG/DL (ref 0.51–0.95)
DEPRECATED HCO3 PLAS-SCNC: 28 MMOL/L (ref 22–29)
GFR SERPL CREATININE-BSD FRML MDRD: >90 ML/MIN/1.73M2
GLUCOSE SERPL-MCNC: 97 MG/DL (ref 70–99)
HDLC SERPL-MCNC: 76 MG/DL
HOLD SPECIMEN: NORMAL
LDLC SERPL CALC-MCNC: 162 MG/DL
NONHDLC SERPL-MCNC: 181 MG/DL
POTASSIUM SERPL-SCNC: 4.7 MMOL/L (ref 3.4–5.3)
SODIUM SERPL-SCNC: 141 MMOL/L (ref 136–145)
TRIGL SERPL-MCNC: 97 MG/DL

## 2023-06-06 PROCEDURE — G0463 HOSPITAL OUTPT CLINIC VISIT: HCPCS

## 2023-06-06 PROCEDURE — 36415 COLL VENOUS BLD VENIPUNCTURE: CPT | Mod: ZL | Performed by: FAMILY MEDICINE

## 2023-06-06 PROCEDURE — 80061 LIPID PANEL: CPT | Mod: ZL | Performed by: FAMILY MEDICINE

## 2023-06-06 PROCEDURE — 80048 BASIC METABOLIC PNL TOTAL CA: CPT | Mod: ZL | Performed by: FAMILY MEDICINE

## 2023-06-06 PROCEDURE — 99213 OFFICE O/P EST LOW 20 MIN: CPT | Mod: 25 | Performed by: FAMILY MEDICINE

## 2023-06-06 PROCEDURE — G0439 PPPS, SUBSEQ VISIT: HCPCS | Performed by: FAMILY MEDICINE

## 2023-06-06 ASSESSMENT — ENCOUNTER SYMPTOMS
DIARRHEA: 0
FEVER: 0
PARESTHESIAS: 0
DIZZINESS: 0
CHILLS: 0
HEMATOCHEZIA: 0
NAUSEA: 1
JOINT SWELLING: 1
NERVOUS/ANXIOUS: 0
ARTHRALGIAS: 1
MYALGIAS: 1
SORE THROAT: 0
HEMATURIA: 0
COUGH: 1
SHORTNESS OF BREATH: 0
BREAST MASS: 0
WEAKNESS: 1
HEADACHES: 0
DYSURIA: 0
HEARTBURN: 0
FREQUENCY: 0
ABDOMINAL PAIN: 0
PALPITATIONS: 0
CONSTIPATION: 0
EYE PAIN: 0

## 2023-06-06 ASSESSMENT — ACTIVITIES OF DAILY LIVING (ADL): CURRENT_FUNCTION: HOUSEWORK REQUIRES ASSISTANCE

## 2023-06-06 ASSESSMENT — PAIN SCALES - GENERAL: PAINLEVEL: MILD PAIN (2)
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
## 2023-06-06 NOTE — PATIENT INSTRUCTIONS
Regular (not sugar free) altoids for the peppermint oil  Aquaphor ointment for your hand  Talk to your pharmacy about your second shingles vaccine

## 2023-06-12 ENCOUNTER — HOSPITAL ENCOUNTER (OUTPATIENT)
Dept: INTERVENTIONAL RADIOLOGY/VASCULAR | Facility: HOSPITAL | Age: 68
Discharge: HOME | End: 2023-06-12
Attending: FAMILY MEDICINE | Admitting: RADIOLOGY
Payer: MEDICARE

## 2023-06-12 ENCOUNTER — TELEPHONE (OUTPATIENT)
Dept: FAMILY MEDICINE | Facility: OTHER | Age: 68
End: 2023-06-12

## 2023-06-12 DIAGNOSIS — M54.41 CHRONIC BILATERAL LOW BACK PAIN WITH RIGHT-SIDED SCIATICA: ICD-10-CM

## 2023-06-12 DIAGNOSIS — E78.5 HYPERLIPIDEMIA, UNSPECIFIED HYPERLIPIDEMIA TYPE: Primary | ICD-10-CM

## 2023-06-12 DIAGNOSIS — G89.29 CHRONIC BILATERAL LOW BACK PAIN WITH RIGHT-SIDED SCIATICA: ICD-10-CM

## 2023-06-12 PROCEDURE — G0463 HOSPITAL OUTPT CLINIC VISIT: HCPCS

## 2023-06-12 RX ORDER — ATORVASTATIN CALCIUM 10 MG/1
10 TABLET, FILM COATED ORAL DAILY
Qty: 30 TABLET | Refills: 0 | Status: SHIPPED | OUTPATIENT
Start: 2023-06-12 | End: 2023-07-13

## 2023-06-12 NOTE — TELEPHONE ENCOUNTER
Patient called stating she is willing to start a statin.  Please send it to [place] where she is vacationing.  Please call her when this is sent in at [phone]            Jeni Sim LPN   6/9/2023  4:09 PM CDT Back to Top      Notified patient of results.  SARA Farr MD   6/9/2023 12:47 PM CDT       The 10-year ASCVD risk score (Lyly DK, et al., 2019) is: 7.7%    Values used to calculate the score:      Age: 68 years      Sex: Female      Is Non- : No      Diabetic: No      Tobacco smoker: No      Systolic Blood Pressure: 110 mmHg      Is BP treated: Yes      HDL Cholesterol: 76 mg/dL      Total Cholesterol: 257 mg/dL      Statin medication recommended.  Metabolic panel normal.

## 2023-07-03 ENCOUNTER — HOSPITAL ENCOUNTER (OUTPATIENT)
Facility: HOSPITAL | Age: 68
Discharge: HOME | End: 2023-07-03
Attending: RADIOLOGY | Admitting: RADIOLOGY
Payer: MEDICARE

## 2023-07-03 ENCOUNTER — HOSPITAL ENCOUNTER (OUTPATIENT)
Dept: GENERAL RADIOLOGY | Facility: HOSPITAL | Age: 68
Discharge: HOME | End: 2023-07-03
Attending: FAMILY MEDICINE
Payer: MEDICARE

## 2023-07-03 DIAGNOSIS — M47.26 OTHER SPONDYLOSIS WITH RADICULOPATHY, LUMBAR REGION: ICD-10-CM

## 2023-07-03 PROCEDURE — 250N000011 HC RX IP 250 OP 636: Mod: JZ | Performed by: RADIOLOGY

## 2023-07-03 PROCEDURE — 272N000472 XR LUMBAR TRANSLAMINAR INJ INCL IMAGING

## 2023-07-03 RX ORDER — DEXAMETHASONE SODIUM PHOSPHATE 10 MG/ML
10 INJECTION, SOLUTION INTRAMUSCULAR; INTRAVENOUS ONCE
Status: COMPLETED | OUTPATIENT
Start: 2023-07-03 | End: 2023-07-03

## 2023-07-03 RX ORDER — IOPAMIDOL 612 MG/ML
15 INJECTION, SOLUTION INTRATHECAL ONCE
Status: COMPLETED | OUTPATIENT
Start: 2023-07-03 | End: 2023-07-03

## 2023-07-03 RX ADMIN — IOPAMIDOL 6 ML: 612 INJECTION, SOLUTION INTRATHECAL at 11:45

## 2023-07-03 RX ADMIN — DEXAMETHASONE SODIUM PHOSPHATE 10 MG: 10 INJECTION, SOLUTION INTRAMUSCULAR; INTRAVENOUS at 11:45

## 2023-07-03 ASSESSMENT — ACTIVITIES OF DAILY LIVING (ADL): ADLS_ACUITY_SCORE: 35

## 2023-07-03 NOTE — DISCHARGE INSTRUCTIONS
Cell number on file:    Telephone Information:   Mobile [phone]     Is it ok to leave a message at this number(s)? Yes    Dr. Granado completed your procedure on 7/3/2023.    Current Pain Level (0-10 Scale): 2/10  Post Pain Level (0-10):  0/10    Radiology Discharge instructions for Steroid Injection    Activity Level:     Do not do any heavy activity or exercise for 24 hours.   Do not drive for 4 hours after your injection.  Diet:   Return to your normal diet.  Medications:   If you have stopped taking your Aspirin, Coumadin/Warfarin, Ibuprofen, or any   other blood thinner for this procedure you may resume in the morning unless   your primary care provider has given you other instructions.    Diabetics may see an increase in blood sugar after steroid injections. If you are concerned about your blood sugar, please contact your family doctor.    Site Care:  Remove the bandage and bathe or shower the morning after the procedure.      This is a Pain Management procedure.  You will be contacted in two weeks for follow up.    Call your Primary Care Provider if you have the following (if your primary care provider is not available please seek emergency care):   Nausea with vomiting   Severe headache   Drowsiness or confusion   Redness or drainage at the injection or puncture site   Temperature over 101 degrees F   Other concerns   Worsening back pain   Stiff neck

## 2023-07-05 DIAGNOSIS — M54.41 CHRONIC BILATERAL LOW BACK PAIN WITH RIGHT-SIDED SCIATICA: ICD-10-CM

## 2023-07-05 DIAGNOSIS — G89.29 CHRONIC BILATERAL LOW BACK PAIN WITH RIGHT-SIDED SCIATICA: ICD-10-CM

## 2023-07-05 DIAGNOSIS — M54.31 SCIATICA OF RIGHT SIDE: ICD-10-CM

## 2023-07-06 RX ORDER — GABAPENTIN 100 MG/1
CAPSULE ORAL
Qty: 180 CAPSULE | Refills: 1 | Status: SHIPPED | OUTPATIENT
Start: 2023-07-06 | End: 2023-12-08

## 2023-07-06 NOTE — TELEPHONE ENCOUNTER
GABAPENTIN 100MG CAPSULE          Last Written Prescription Date:  4/24/23  Last Fill Quantity: 180,   # refills: 0  Last Office Visit: 6/6/23  Future Office visit:       Routing refill request to provider for review/approval because:    Drug not on the [facility], [facility] or [facility] refill protocol or controlled substance

## 2023-07-10 ENCOUNTER — TRANSFERRED RECORDS (OUTPATIENT)
Dept: HEALTH INFORMATION MANAGEMENT | Facility: CLINIC | Age: 68
End: 2023-07-10
Payer: COMMERCIAL

## 2023-07-10 ENCOUNTER — MEDICAL CORRESPONDENCE (OUTPATIENT)
Dept: MRI IMAGING | Facility: HOSPITAL | Age: 68
End: 2023-07-10

## 2023-07-13 DIAGNOSIS — E78.5 HYPERLIPIDEMIA, UNSPECIFIED HYPERLIPIDEMIA TYPE: ICD-10-CM

## 2023-07-13 RX ORDER — ATORVASTATIN CALCIUM 10 MG/1
10 TABLET, FILM COATED ORAL DAILY
Qty: 90 TABLET | Refills: 1 | Status: SHIPPED | OUTPATIENT
Start: 2023-07-13 | End: 2023-11-21

## 2023-07-17 ENCOUNTER — TELEPHONE (OUTPATIENT)
Dept: INTERVENTIONAL RADIOLOGY/VASCULAR | Facility: HOSPITAL | Age: 68
End: 2023-07-17

## 2023-07-17 NOTE — TELEPHONE ENCOUNTER
CONSULT PATIENT  PAIN INJECTION POST CALL    Procedure: Epidural TL L2-3  Radiologist(s): Dr. Giancarlo Granado  Date of Procedure: 7/3/23    The patient was not available by telephone.    LEFT A MESSAGE FOR PATIENT TO CALL IR DEPARTMENT      Caroline Yeboah

## 2023-07-17 NOTE — TELEPHONE ENCOUNTER
CONSULT PATIENT  PAIN INJECTION POST CALL    Procedure: Epidural TL L2-3  Radiologist(s): Dr. Giancarlo Granado  Date of Procedure: 7/3/23    I responded to the patient's questions/concerns.    Relief of pain from this injection    A = 90%  A- = 85%  B = 80%  B- =75%  C = 70%  C- = 65%  D = 60%  D- = 50%  F = less than 50%       Did you get any relief from this injection in the past 2 weeks? Yes for the first week patient was feeling about 25% of relief. She stated it took her sciatica pain away.     If yes, how long did the relief last? 1 week    Are you stlll feeling relief? (2 weeks out) No, she is now at 0% of relief.  Would you say this injection has been beneficial? Somewhat      Was there one injection that worked better than the other? The first injection she had (the same injection) was better. She got 2 months of relief at 75%    Where is the pain? The pain is still located in the lower back below the waistline, on the right side.  Can you describe the pain? Spasms, shooting, sharp  Does the pain radiate anywhere? Yes  If yes, where does it radiate and where does the pain stop? It travels to the right buttocks then down the backside of the right leg going to the top of the foot where it stops.    Is this new pain? No    Patient would like to pursue another injection.      Caroline Yeboah
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
## 2023-07-21 ENCOUNTER — OFFICE VISIT (OUTPATIENT)
Dept: FAMILY MEDICINE | Facility: OTHER | Age: 68
End: 2023-07-21
Attending: FAMILY MEDICINE
Payer: COMMERCIAL

## 2023-07-21 VITALS
HEART RATE: 70 BPM | DIASTOLIC BLOOD PRESSURE: 64 MMHG | BODY MASS INDEX: 27.83 KG/M2 | WEIGHT: 147.4 LBS | OXYGEN SATURATION: 99 % | SYSTOLIC BLOOD PRESSURE: 126 MMHG | HEIGHT: 61 IN | RESPIRATION RATE: 18 BRPM | TEMPERATURE: 97.2 F

## 2023-07-21 DIAGNOSIS — S83.242D TEAR OF MEDIAL MENISCUS OF LEFT KNEE, CURRENT, UNSPECIFIED TEAR TYPE, SUBSEQUENT ENCOUNTER: ICD-10-CM

## 2023-07-21 DIAGNOSIS — I10 BENIGN ESSENTIAL HYPERTENSION: ICD-10-CM

## 2023-07-21 DIAGNOSIS — Z01.818 PRE-OPERATIVE GENERAL PHYSICAL EXAMINATION: Primary | ICD-10-CM

## 2023-07-21 DIAGNOSIS — E78.5 HYPERLIPIDEMIA, UNSPECIFIED HYPERLIPIDEMIA TYPE: ICD-10-CM

## 2023-07-21 LAB
ANION GAP SERPL CALCULATED.3IONS-SCNC: 13 MMOL/L (ref 7–15)
BUN SERPL-MCNC: 12.3 MG/DL (ref 8–23)
CALCIUM SERPL-MCNC: 9.6 MG/DL (ref 8.8–10.2)
CHLORIDE SERPL-SCNC: 100 MMOL/L (ref 98–107)
CREAT SERPL-MCNC: 0.75 MG/DL (ref 0.51–0.95)
DEPRECATED HCO3 PLAS-SCNC: 26 MMOL/L (ref 22–29)
ERYTHROCYTE [DISTWIDTH] IN BLOOD BY AUTOMATED COUNT: 13.3 % (ref 10–15)
GFR SERPL CREATININE-BSD FRML MDRD: 86 ML/MIN/1.73M2
GLUCOSE SERPL-MCNC: 98 MG/DL (ref 70–99)
HCT VFR BLD AUTO: 38.8 % (ref 35–47)
HGB BLD-MCNC: 13.2 G/DL (ref 11.7–15.7)
MCH RBC QN AUTO: 33.1 PG (ref 26.5–33)
MCHC RBC AUTO-ENTMCNC: 34 G/DL (ref 31.5–36.5)
MCV RBC AUTO: 97 FL (ref 78–100)
PLATELET # BLD AUTO: 327 10E3/UL (ref 150–450)
POTASSIUM SERPL-SCNC: 4.1 MMOL/L (ref 3.4–5.3)
RBC # BLD AUTO: 3.99 10E6/UL (ref 3.8–5.2)
SODIUM SERPL-SCNC: 139 MMOL/L (ref 136–145)
WBC # BLD AUTO: 6.5 10E3/UL (ref 4–11)

## 2023-07-21 PROCEDURE — 80048 BASIC METABOLIC PNL TOTAL CA: CPT | Mod: ZL | Performed by: FAMILY MEDICINE

## 2023-07-21 PROCEDURE — 85027 COMPLETE CBC AUTOMATED: CPT | Mod: ZL | Performed by: FAMILY MEDICINE

## 2023-07-21 PROCEDURE — 99214 OFFICE O/P EST MOD 30 MIN: CPT | Performed by: FAMILY MEDICINE

## 2023-07-21 PROCEDURE — G0463 HOSPITAL OUTPT CLINIC VISIT: HCPCS

## 2023-07-21 PROCEDURE — 36415 COLL VENOUS BLD VENIPUNCTURE: CPT | Mod: ZL | Performed by: FAMILY MEDICINE

## 2023-07-21 ASSESSMENT — PAIN SCALES - GENERAL: PAINLEVEL: MILD PAIN (2)

## 2023-08-01 DIAGNOSIS — M54.41 CHRONIC BILATERAL LOW BACK PAIN WITH RIGHT-SIDED SCIATICA: ICD-10-CM

## 2023-08-01 DIAGNOSIS — G89.29 CHRONIC BILATERAL LOW BACK PAIN WITH RIGHT-SIDED SCIATICA: ICD-10-CM

## 2023-08-01 RX ORDER — BACLOFEN 10 MG/1
10 TABLET ORAL 3 TIMES DAILY PRN
Qty: 90 TABLET | Refills: 1 | Status: SHIPPED | OUTPATIENT
Start: 2023-08-01 | End: 2024-01-04

## 2023-08-01 NOTE — TELEPHONE ENCOUNTER
baclofen (LIORESAL) 10 MG tablet       Last Written Prescription Date:  12/16/23  Last Fill Quantity: 90,   # refills: 1  Last Office Visit: 7/21/23  Future Office visit:       Routing refill request to provider for review/approval because:

## 2023-08-16 NOTE — ADDENDUM NOTE
Addended by: ILEANA ROBERT on: 8/21/2018 01:15 PM     Modules accepted: Orders     Detail Level: Simple Detail Level: Detailed

## 2023-08-25 ENCOUNTER — TELEPHONE (OUTPATIENT)
Dept: INTERVENTIONAL RADIOLOGY/VASCULAR | Facility: HOSPITAL | Age: 68
End: 2023-08-25

## 2023-08-25 NOTE — TELEPHONE ENCOUNTER
Called patient to remind them of their appt on 8/29. Also reminded patient to not take any antibiotics, steroids, or immunizations two weeks before and after this appt.  And they also need a .     REILLY CALLEJAS

## 2023-08-29 ENCOUNTER — HOSPITAL ENCOUNTER (OUTPATIENT)
Dept: GENERAL RADIOLOGY | Facility: HOSPITAL | Age: 68
Discharge: HOME | End: 2023-08-29
Attending: FAMILY MEDICINE | Admitting: RADIOLOGY
Payer: MEDICARE

## 2023-08-29 ENCOUNTER — HOSPITAL ENCOUNTER (OUTPATIENT)
Facility: HOSPITAL | Age: 68
Discharge: HOME | End: 2023-08-29
Attending: RADIOLOGY | Admitting: RADIOLOGY
Payer: MEDICARE

## 2023-08-29 DIAGNOSIS — M53.3 SACROILIAC JOINT PAIN: ICD-10-CM

## 2023-08-29 PROCEDURE — 250N000009 HC RX 250: Performed by: RADIOLOGY

## 2023-08-29 PROCEDURE — 27096 INJECT SACROILIAC JOINT: CPT | Mod: RT

## 2023-08-29 PROCEDURE — 250N000011 HC RX IP 250 OP 636: Mod: JZ | Performed by: RADIOLOGY

## 2023-08-29 RX ORDER — LIDOCAINE HYDROCHLORIDE 10 MG/ML
10 INJECTION, SOLUTION EPIDURAL; INFILTRATION; INTRACAUDAL; PERINEURAL ONCE
Status: DISCONTINUED | OUTPATIENT
Start: 2023-08-29 | End: 2023-08-30 | Stop reason: HOSPADM

## 2023-08-29 RX ORDER — LIDOCAINE HYDROCHLORIDE 10 MG/ML
5 INJECTION, SOLUTION EPIDURAL; INFILTRATION; INTRACAUDAL; PERINEURAL ONCE
Status: DISCONTINUED | OUTPATIENT
Start: 2023-08-29 | End: 2023-08-29

## 2023-08-29 RX ORDER — IOPAMIDOL 612 MG/ML
30 INJECTION, SOLUTION INTRAVASCULAR ONCE
Status: DISCONTINUED | OUTPATIENT
Start: 2023-08-29 | End: 2023-08-30 | Stop reason: HOSPADM

## 2023-08-29 RX ORDER — TRIAMCINOLONE ACETONIDE 40 MG/ML
40 INJECTION, SUSPENSION INTRA-ARTICULAR; INTRAMUSCULAR ONCE
Status: COMPLETED | OUTPATIENT
Start: 2023-08-29 | End: 2023-08-29

## 2023-08-29 RX ADMIN — TRIAMCINOLONE ACETONIDE 40 MG: 40 INJECTION, SUSPENSION INTRA-ARTICULAR; INTRAMUSCULAR at 15:06

## 2023-08-29 RX ADMIN — IOPAMIDOL 2 ML: 612 INJECTION, SOLUTION INTRAVENOUS at 15:06

## 2023-08-29 RX ADMIN — LIDOCAINE HYDROCHLORIDE 5 ML: 10 INJECTION, SOLUTION EPIDURAL; INFILTRATION; INTRACAUDAL; PERINEURAL at 15:05

## 2023-08-29 ASSESSMENT — ACTIVITIES OF DAILY LIVING (ADL)
ADLS_ACUITY_SCORE: 35

## 2023-08-29 NOTE — DISCHARGE INSTRUCTIONS
Home number on file [phone] (home)  Is it ok to leave a message at this number(s)? Yes    Dr Moreno completed your procedure on 8/29/2023.    Current Pain Level (0-10 Scale): 2/10  Post Pain Level (0-10):  1/10    Radiology Discharge instructions for Steroid Injection    Activity Level:     Do not do any heavy activity or exercise for 24 hours.   Do not drive for 4 hours after your injection.  Diet:   Return to your normal diet.  Medications:   If you have stopped taking your Aspirin, Coumadin/Warfarin, Ibuprofen, or any   other blood thinner for this procedure you may resume in the morning unless   your primary care provider has given you other instructions.    Diabetics may see an increase in blood sugar after steroid injections. If you are concerned about your blood sugar, please contact your family doctor.    Site Care:  Remove the bandage and bathe or shower the morning after the procedure.      This is a Pain Management procedure.  You will be contacted in two weeks for follow up.    Call your Primary Care Provider if you have the following (if your primary care provider is not available please seek emergency care):   Nausea with vomiting   Severe headache   Drowsiness or confusion   Redness or drainage at the injection or puncture site   Temperature over 101 degrees F   Other concerns   Worsening back pain   Stiff neck

## 2023-08-30 ASSESSMENT — ACTIVITIES OF DAILY LIVING (ADL)
ADLS_ACUITY_SCORE: 35

## 2023-09-12 ENCOUNTER — TELEPHONE (OUTPATIENT)
Dept: INTERVENTIONAL RADIOLOGY/VASCULAR | Facility: HOSPITAL | Age: 68
End: 2023-09-12

## 2023-09-12 NOTE — TELEPHONE ENCOUNTER
INJECTION POST CALL    Procedure: Epidural bilat L4-5,L5-s1 facet   Radiologist(s): Dr. Rory Moreno  Date of Procedure:  08/29/23    Relief of pain from this injection    A = 90%  A- = 85%  B = 80%  B- =75%  C = 70%  C- = 65%  D = 60%  D- = 50%  F = less than 50%    Percentage of relief   90    Do you feel this injection was beneficial?yes   If yes, how long did it last?  Patient states that they are currently receiving relief from this injection.    Instruct patient to follow up with their provider for any further care they may need. (as Dr. Granado will no longer be making recommendations nor addended the exam with recommmendations)    Zahraa Pereira

## 2023-11-15 DIAGNOSIS — M54.41 CHRONIC BILATERAL LOW BACK PAIN WITH RIGHT-SIDED SCIATICA: ICD-10-CM

## 2023-11-15 DIAGNOSIS — G89.29 CHRONIC BILATERAL LOW BACK PAIN WITH RIGHT-SIDED SCIATICA: ICD-10-CM

## 2023-11-15 DIAGNOSIS — M54.31 SCIATICA OF RIGHT SIDE: ICD-10-CM

## 2023-11-15 RX ORDER — HYDROCODONE BITARTRATE AND ACETAMINOPHEN 7.5; 325 MG/1; MG/1
1 TABLET ORAL EVERY 6 HOURS PRN
Qty: 30 TABLET | Refills: 0 | Status: SHIPPED | OUTPATIENT
Start: 2023-11-15 | End: 2024-08-12

## 2023-11-15 NOTE — TELEPHONE ENCOUNTER
Roseanneco      Last Written Prescription Date:  12.27.22  Last Fill Quantity: #30,   # refills: 0  Last Office Visit: 7.21.23  Future Office visit:       Routing refill request to provider for review/approval because:  Drug not on the [facility], P or [facility] refill protocol or controlled substance

## 2023-11-20 DIAGNOSIS — Z78.0 MENOPAUSE: ICD-10-CM

## 2023-11-20 DIAGNOSIS — I10 BENIGN ESSENTIAL HYPERTENSION: ICD-10-CM

## 2023-11-20 DIAGNOSIS — E78.5 HYPERLIPIDEMIA, UNSPECIFIED HYPERLIPIDEMIA TYPE: ICD-10-CM

## 2023-11-20 NOTE — TELEPHONE ENCOUNTER
Zestril      Last Written Prescription Date:  06/01/23  Last Fill Quantity: 90,   # refills: 1  Last Office Visit: 07/21/23  Future Office visit:         Celexa      Last Written Prescription Date:  06/01/23  Last Fill Quantity: 90,   # refills: 1  Last Office Visit: 07/21/23  Future Office visit:         Lipitor      Last Written Prescription Date:  07/13/23  Last Fill Quantity: 90,   # refills: 1  Last Office Visit: 07/21/23  Future Office visit:

## 2023-11-21 RX ORDER — ATORVASTATIN CALCIUM 10 MG/1
TABLET, FILM COATED ORAL
Qty: 90 TABLET | Refills: 3 | Status: SHIPPED | OUTPATIENT
Start: 2023-11-21

## 2023-11-21 RX ORDER — CITALOPRAM HYDROBROMIDE 20 MG/1
TABLET ORAL
Qty: 90 TABLET | Refills: 3 | Status: SHIPPED | OUTPATIENT
Start: 2023-11-21

## 2023-11-21 RX ORDER — LISINOPRIL 20 MG/1
TABLET ORAL
Qty: 90 TABLET | Refills: 3 | Status: SHIPPED | OUTPATIENT
Start: 2023-11-21

## 2023-12-07 DIAGNOSIS — M54.41 CHRONIC BILATERAL LOW BACK PAIN WITH RIGHT-SIDED SCIATICA: ICD-10-CM

## 2023-12-07 DIAGNOSIS — M54.31 SCIATICA OF RIGHT SIDE: ICD-10-CM

## 2023-12-07 DIAGNOSIS — G89.29 CHRONIC BILATERAL LOW BACK PAIN WITH RIGHT-SIDED SCIATICA: ICD-10-CM

## 2023-12-07 NOTE — TELEPHONE ENCOUNTER
gabapentin (NEURONTIN) 100 MG capsule 180 capsule 1 7/6/2023   Last Office Visit: 07/21/23     Future Office visit:       Routing refill request to provider for review/approval because:

## 2023-12-08 RX ORDER — GABAPENTIN 100 MG/1
CAPSULE ORAL
Qty: 180 CAPSULE | Refills: 1 | Status: SHIPPED | OUTPATIENT
Start: 2023-12-08 | End: 2024-01-04
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
## 2024-01-02 DIAGNOSIS — M81.0 AGE-RELATED OSTEOPOROSIS WITHOUT CURRENT PATHOLOGICAL FRACTURE: ICD-10-CM

## 2024-01-03 ENCOUNTER — TELEPHONE (OUTPATIENT)
Dept: FAMILY MEDICINE | Facility: OTHER | Age: 69
End: 2024-01-03

## 2024-01-03 DIAGNOSIS — M54.31 SCIATICA OF RIGHT SIDE: ICD-10-CM

## 2024-01-03 DIAGNOSIS — G89.29 CHRONIC BILATERAL LOW BACK PAIN WITH RIGHT-SIDED SCIATICA: ICD-10-CM

## 2024-01-03 DIAGNOSIS — M54.41 CHRONIC BILATERAL LOW BACK PAIN WITH RIGHT-SIDED SCIATICA: ICD-10-CM

## 2024-01-03 NOTE — TELEPHONE ENCOUNTER
[facility]      Last Written Prescription Date:  9.15.23  Last Fill Quantity: #3,   # refills: 0  Last Office Visit: 7.21.23  Future Office visit:       Routing refill request to provider for review/approval because:      
fall, multiple ich/sdh , syncope

## 2024-01-03 NOTE — TELEPHONE ENCOUNTER
11:36 AM    Reason for Call: Phone Call    Description: pt called she has some questions about her medications, pt stated that some of them are not working correctly and she does not know if she needs to up them or completely change her medications, pt would like some advise on what she should do     Was an appointment offered for this call? No  If yes : Appointment type              Date    Preferred method for responding to this message: Telephone Call  What is your phone number ? [phone]    If we cannot reach you directly, may we leave a detailed response at the number you provided? Yes    Can this message wait until your PCP/provider returns, if available today? Not applicable    Melody Kevin

## 2024-01-04 RX ORDER — IBANDRONATE SODIUM 150 MG/1
TABLET, FILM COATED ORAL
Qty: 3 TABLET | Refills: 1 | Status: SHIPPED | OUTPATIENT
Start: 2024-01-04 | End: 2024-04-05

## 2024-01-04 RX ORDER — BACLOFEN 10 MG/1
TABLET ORAL
Qty: 90 TABLET | Refills: 0 | Status: SHIPPED | OUTPATIENT
Start: 2024-01-04 | End: 2024-08-13

## 2024-01-04 RX ORDER — GABAPENTIN 100 MG/1
CAPSULE ORAL
Qty: 180 CAPSULE | Refills: 0 | Status: SHIPPED | OUTPATIENT
Start: 2024-01-04 | End: 2024-01-09

## 2024-01-04 NOTE — TELEPHONE ENCOUNTER
Baclofen      Last Written Prescription Date:  8/1/23  Last Fill Quantity: 90,   # refills: 1  Last Office Visit: 7/21/23  Future Office visit:    Next 5 appointments (look out 90 days)      Jan 09, 2024  3:00 PM  (Arrive by 2:45 PM)  SHORT with Carolyn Ritter MD  [facility] ([facility] ) [address]  [place] [address]  [phone]             Routing refill request to provider for review/approval because:      Gabapentin      Last Written Prescription Date:  12/8/23  Last Fill Quantity: 180,   # refills: 1  Last Office Visit: 7/21/23  Future Office visit:    Next 5 appointments (look out 90 days)      Jan 09, 2024  3:00 PM  (Arrive by 2:45 PM)  SHORT with Carolyn Ritter MD  [facility] ([facility] ) [address]  [place] [address]  [phone]             Routing refill request to provider for review/approval because:

## 2024-01-09 ENCOUNTER — OFFICE VISIT (OUTPATIENT)
Dept: FAMILY MEDICINE | Facility: OTHER | Age: 69
End: 2024-01-09
Attending: FAMILY MEDICINE
Payer: COMMERCIAL

## 2024-01-09 VITALS
WEIGHT: 142.4 LBS | OXYGEN SATURATION: 97 % | HEIGHT: 61 IN | BODY MASS INDEX: 26.88 KG/M2 | HEART RATE: 73 BPM | SYSTOLIC BLOOD PRESSURE: 120 MMHG | TEMPERATURE: 97.7 F | RESPIRATION RATE: 18 BRPM | DIASTOLIC BLOOD PRESSURE: 64 MMHG

## 2024-01-09 DIAGNOSIS — F41.9 ANXIETY: Primary | ICD-10-CM

## 2024-01-09 DIAGNOSIS — M54.41 CHRONIC BILATERAL LOW BACK PAIN WITH RIGHT-SIDED SCIATICA: ICD-10-CM

## 2024-01-09 DIAGNOSIS — M54.31 SCIATICA OF RIGHT SIDE: ICD-10-CM

## 2024-01-09 DIAGNOSIS — G89.29 CHRONIC BILATERAL LOW BACK PAIN WITH RIGHT-SIDED SCIATICA: ICD-10-CM

## 2024-01-09 PROCEDURE — 99214 OFFICE O/P EST MOD 30 MIN: CPT | Performed by: FAMILY MEDICINE

## 2024-01-09 PROCEDURE — G0463 HOSPITAL OUTPT CLINIC VISIT: HCPCS

## 2024-01-09 RX ORDER — BUPROPION HYDROCHLORIDE 150 MG/1
150 TABLET ORAL EVERY MORNING
Qty: 30 TABLET | Refills: 2 | Status: SHIPPED | OUTPATIENT
Start: 2024-01-09 | End: 2024-04-05

## 2024-01-09 RX ORDER — GABAPENTIN 100 MG/1
300 CAPSULE ORAL AT BEDTIME
Qty: 270 CAPSULE | Refills: 0 | Status: SHIPPED | OUTPATIENT
Start: 2024-01-09 | End: 2024-04-24

## 2024-01-09 ASSESSMENT — ANXIETY QUESTIONNAIRES
2. NOT BEING ABLE TO STOP OR CONTROL WORRYING: SEVERAL DAYS
3. WORRYING TOO MUCH ABOUT DIFFERENT THINGS: SEVERAL DAYS
GAD7 TOTAL SCORE: 3
8. IF YOU CHECKED OFF ANY PROBLEMS, HOW DIFFICULT HAVE THESE MADE IT FOR YOU TO DO YOUR WORK, TAKE CARE OF THINGS AT HOME, OR GET ALONG WITH OTHER PEOPLE?: SOMEWHAT DIFFICULT
7. FEELING AFRAID AS IF SOMETHING AWFUL MIGHT HAPPEN: NOT AT ALL
6. BECOMING EASILY ANNOYED OR IRRITABLE: SEVERAL DAYS
GAD7 TOTAL SCORE: 3
GAD7 TOTAL SCORE: 3
4. TROUBLE RELAXING: NOT AT ALL
7. FEELING AFRAID AS IF SOMETHING AWFUL MIGHT HAPPEN: NOT AT ALL
5. BEING SO RESTLESS THAT IT IS HARD TO SIT STILL: NOT AT ALL
1. FEELING NERVOUS, ANXIOUS, OR ON EDGE: NOT AT ALL
IF YOU CHECKED OFF ANY PROBLEMS ON THIS QUESTIONNAIRE, HOW DIFFICULT HAVE THESE PROBLEMS MADE IT FOR YOU TO DO YOUR WORK, TAKE CARE OF THINGS AT HOME, OR GET ALONG WITH OTHER PEOPLE: SOMEWHAT DIFFICULT

## 2024-01-09 ASSESSMENT — PATIENT HEALTH QUESTIONNAIRE - PHQ9
SUM OF ALL RESPONSES TO PHQ QUESTIONS 1-9: 4
SUM OF ALL RESPONSES TO PHQ QUESTIONS 1-9: 4

## 2024-01-09 ASSESSMENT — PAIN SCALES - GENERAL: PAINLEVEL: MILD PAIN (2)

## 2024-01-09 NOTE — PROGRESS NOTES
"  Assessment & Plan     1. Anxiety  Will add bupropion to help with increased anxiety.  Follow-up in 4 weeks for medication review, sooner as needed.  - buPROPion (WELLBUTRIN XL) 150 MG 24 hr tablet; Take 1 tablet (150 mg) by mouth every morning  Dispense: 30 tablet; Refill: 2    2. Chronic bilateral low back pain with right-sided sciatica  Will increase dose slightly, can increase further if needed.  - gabapentin (NEURONTIN) 100 MG capsule; Take 3 capsules (300 mg) by mouth at bedtime  Dispense: 270 capsule; Refill: 0    3. Sciatica of right side  As above.  - gabapentin (NEURONTIN) 100 MG capsule; Take 3 capsules (300 mg) by mouth at bedtime  Dispense: 270 capsule; Refill: 0          BMI:   Estimated body mass index is 26.91 kg/m  as calculated from the following:    Height as of this encounter: 1.549 m (5' 1\").    Weight as of this encounter: 64.6 kg (142 lb 6.4 oz).     Return in about 4 weeks (around 2024) for Medication review.    Carolyn Ritter MD  [facility] - AURORA Gonzalez is a 68 year old, presenting for the following health issues:  Recheck Medication      HPI     Anxiety Follow-Up  How are you doing with your anxiety since your last visit? Worsened -pts has some family issue  Are you having other symptoms that might be associated with anxiety? Yes:  can't seem to focus  Have you had a significant life event? Relationship Concerns   Are you feeling depressed? Yes:  a few days  Do you have any concerns with your use of alcohol or other drugs? No  Patient has had more trouble focusing and task managing.      Social History     Tobacco Use    Smoking status: Former     Types: Cigarettes     Quit date: 1993     Years since quittin.4    Smokeless tobacco: Never    Tobacco comments:     year quit: , no passive exposure   Vaping Use    Vaping Use: Never used   Substance Use Topics    Alcohol use: Yes     Comment: socially-weekends    Drug use: No         " 3/26/2018     9:30 AM 9/20/2018    11:54 AM 1/9/2024     2:54 PM   DAYTON-7 SCORE   Total Score   3 (minimal anxiety)   Total Score 1 4 3         3/26/2018     9:30 AM 9/20/2018    11:54 AM 1/9/2024     2:53 PM   PHQ   PHQ-9 Total Score 4 8 4   Q9: Thoughts of better off dead/self-harm past 2 weeks Not at all Not at all Not at all         1/9/2024     2:53 PM   Last PHQ-9   1.  Little interest or pleasure in doing things 1   2.  Feeling down, depressed, or hopeless 1   3.  Trouble falling or staying asleep, or sleeping too much 1   4.  Feeling tired or having little energy 1   5.  Poor appetite or overeating 0   6.  Feeling bad about yourself 0   7.  Trouble concentrating 0   8.  Moving slowly or restless 0   Q9: Thoughts of better off dead/self-harm past 2 weeks 0   PHQ-9 Total Score 4         1/9/2024     2:54 PM   DAYTON-7    1. Feeling nervous, anxious, or on edge 0   2. Not being able to stop or control worrying 1   3. Worrying too much about different things 1   4. Trouble relaxing 0   5. Being so restless that it is hard to sit still 0   6. Becoming easily annoyed or irritable 1   7. Feeling afraid, as if something awful might happen 0   DAYTON-7 Total Score 3   If you checked any problems, how difficult have they made it for you to do your work, take care of things at home, or get along with other people? Somewhat difficult       Concern - Muscle spasms  Onset: several years  Description: muscle spasms  Intensity: moderate  Progression of Symptoms:  worsening  Accompanying Signs & Symptoms: increasing and not sure if the medication is helping  Previous history of similar problem: yes  Precipitating factors:        Worsened by: nothing  Alleviating factors:        Improved by: nothing  Therapies tried and outcome: baclofen, gabapentin      Review of Systems   Constitutional, HEENT, cardiovascular, pulmonary, gi and gu systems are negative, except as otherwise noted.      Objective    /64 (BP Location: Right arm,  "Patient Position: Sitting, Cuff Size: Adult Regular)   Pulse 73   Temp 97.7  F (36.5  C) (Tympanic)   Resp 18   Ht 1.549 m (5' 1\")   Wt 64.6 kg (142 lb 6.4 oz)   SpO2 97%   BMI 26.91 kg/m    Body mass index is 26.91 kg/m .  Physical Exam   GENERAL: healthy, alert and no distress  PSYCH: mentation appears normal and anxious          Answers submitted by the patient for this visit:  Patient Health Questionnaire (Submitted on 1/9/2024)  PHQ9 TOTAL SCORE: 4  DAYTON-7 (Submitted on 1/9/2024)  DAYTON 7 TOTAL SCORE: 3    "

## 2024-02-05 NOTE — PROGRESS NOTES
"  Assessment & Plan     1. Hyperlipidemia, unspecified hyperlipidemia type  Labs updated, will adjust medication as needed.  Follow-up in six months, sooner as needed.  - Lipid Profile (Chol, Trig, HDL, LDL calc); Future  - ALT; Future  - Lipid Profile (Chol, Trig, HDL, LDL calc)  - ALT    2. Benign essential hypertension  As above.  - Basic metabolic panel; Future  - Basic metabolic panel    3. Anxiety  No changes, patient is doing well with current medication    4. Chronic bilateral low back pain with right-sided sciatica  No changes to current care plan, patient does find increased dose of gabapentin helpful.         BMI  Estimated body mass index is 26.72 kg/m  as calculated from the following:    Height as of this encounter: 1.549 m (5' 1\").    Weight as of this encounter: 64.1 kg (141 lb 6.4 oz).       Return in about 3 months (around 5/6/2024) for Chronic Disease Management, Medication review.      Rodrigue Gonzalez is a 69 year old, presenting for the following health issues:  Hypertension and Lipids    HPI     Hyperlipidemia Follow-Up    Are you regularly taking any medication or supplement to lower your cholesterol?   Yes- Lipitor 10 mg  Are you having muscle aches or other side effects that you think could be caused by your cholesterol lowering medication?  No    Hypertension Follow-up    Do you check your blood pressure regularly outside of the clinic? No   Are you following a low salt diet? Yes  Are your blood pressures ever more than 140 on the top number (systolic) OR more   than 90 on the bottom number (diastolic), for example 140/90? No    Depression and Anxiety Follow-Up  How are you doing with your depression since your last visit? No change  How are you doing with your anxiety since your last visit?  Improved a bit  Are you having other symptoms that might be associated with depression or anxiety? No  Have you had a significant life event? No   Do you have any concerns with your use of alcohol " "or other drugs? No    Social History     Tobacco Use    Smoking status: Former     Types: Cigarettes     Quit date: 1993     Years since quittin.5    Smokeless tobacco: Never    Tobacco comments:     year quit: , no passive exposure   Vaping Use    Vaping Use: Never used   Substance Use Topics    Alcohol use: Yes     Comment: socially-weekends    Drug use: No         3/26/2018     9:30 AM 2018    11:54 AM 2024     2:53 PM   PHQ   PHQ-9 Total Score 4 8 4   Q9: Thoughts of better off dead/self-harm past 2 weeks Not at all Not at all Not at all         3/26/2018     9:30 AM 2018    11:54 AM 2024     2:54 PM   DAYTON-7 SCORE   Total Score   3 (minimal anxiety)   Total Score 1 4 3       Suicide Assessment Five-step Evaluation and Treatment (SAFE-T)    Chronic/Recurring Back Pain Follow Up    Where is your back pain located? (Select all that apply) low back bilateral  Where does your back pain spread? the right buttock  Since your last clinic visit for back pain, how has your pain changed? unchanged  Does your back pain interfere with your job? Not applicable  Since your last visit, have you tried any new treatment? Medication adjustment        Review of Systems  Constitutional, HEENT, cardiovascular, pulmonary, gi and gu systems are negative, except as otherwise noted.      Objective    /58 (BP Location: Left arm, Patient Position: Chair, Cuff Size: Adult Regular)   Pulse 70   Temp 97.8  F (36.6  C) (Tympanic)   Resp 16   Ht 1.549 m (5' 1\")   Wt 64.1 kg (141 lb 6.4 oz)   SpO2 98%   BMI 26.72 kg/m    Body mass index is 26.72 kg/m .  Physical Exam   GENERAL: alert and no distress  PSYCH: mentation appears normal, affect normal/bright          Signed Electronically by: Carolyn Ritter MD    "

## 2024-02-06 ENCOUNTER — OFFICE VISIT (OUTPATIENT)
Dept: FAMILY MEDICINE | Facility: OTHER | Age: 69
End: 2024-02-06
Attending: FAMILY MEDICINE
Payer: COMMERCIAL

## 2024-02-06 VITALS
HEART RATE: 70 BPM | HEIGHT: 61 IN | OXYGEN SATURATION: 98 % | TEMPERATURE: 97.8 F | WEIGHT: 141.4 LBS | DIASTOLIC BLOOD PRESSURE: 58 MMHG | RESPIRATION RATE: 16 BRPM | BODY MASS INDEX: 26.7 KG/M2 | SYSTOLIC BLOOD PRESSURE: 110 MMHG

## 2024-02-06 DIAGNOSIS — F41.9 ANXIETY: ICD-10-CM

## 2024-02-06 DIAGNOSIS — I10 BENIGN ESSENTIAL HYPERTENSION: ICD-10-CM

## 2024-02-06 DIAGNOSIS — E78.5 HYPERLIPIDEMIA, UNSPECIFIED HYPERLIPIDEMIA TYPE: Primary | ICD-10-CM

## 2024-02-06 DIAGNOSIS — G89.29 CHRONIC BILATERAL LOW BACK PAIN WITH RIGHT-SIDED SCIATICA: ICD-10-CM

## 2024-02-06 DIAGNOSIS — M54.41 CHRONIC BILATERAL LOW BACK PAIN WITH RIGHT-SIDED SCIATICA: ICD-10-CM

## 2024-02-06 LAB
ALT SERPL W P-5'-P-CCNC: 21 U/L (ref 0–50)
ANION GAP SERPL CALCULATED.3IONS-SCNC: 12 MMOL/L (ref 7–15)
BUN SERPL-MCNC: 15.1 MG/DL (ref 8–23)
CALCIUM SERPL-MCNC: 9.9 MG/DL (ref 8.8–10.2)
CHLORIDE SERPL-SCNC: 101 MMOL/L (ref 98–107)
CHOLEST SERPL-MCNC: 171 MG/DL
CREAT SERPL-MCNC: 0.7 MG/DL (ref 0.51–0.95)
DEPRECATED HCO3 PLAS-SCNC: 29 MMOL/L (ref 22–29)
EGFRCR SERPLBLD CKD-EPI 2021: >90 ML/MIN/1.73M2
FASTING STATUS PATIENT QL REPORTED: NO
GLUCOSE SERPL-MCNC: 90 MG/DL (ref 70–99)
HDLC SERPL-MCNC: 74 MG/DL
HOLD SPECIMEN: NORMAL
LDLC SERPL CALC-MCNC: 83 MG/DL
NONHDLC SERPL-MCNC: 97 MG/DL
POTASSIUM SERPL-SCNC: 4.3 MMOL/L (ref 3.4–5.3)
SODIUM SERPL-SCNC: 142 MMOL/L (ref 135–145)
TRIGL SERPL-MCNC: 71 MG/DL

## 2024-02-06 PROCEDURE — 80061 LIPID PANEL: CPT | Mod: ZL | Performed by: FAMILY MEDICINE

## 2024-02-06 PROCEDURE — G0463 HOSPITAL OUTPT CLINIC VISIT: HCPCS

## 2024-02-06 PROCEDURE — 36415 COLL VENOUS BLD VENIPUNCTURE: CPT | Mod: ZL | Performed by: FAMILY MEDICINE

## 2024-02-06 PROCEDURE — 99214 OFFICE O/P EST MOD 30 MIN: CPT | Performed by: FAMILY MEDICINE

## 2024-02-06 PROCEDURE — 80048 BASIC METABOLIC PNL TOTAL CA: CPT | Mod: ZL | Performed by: FAMILY MEDICINE

## 2024-02-06 PROCEDURE — 84460 ALANINE AMINO (ALT) (SGPT): CPT | Mod: ZL | Performed by: FAMILY MEDICINE

## 2024-02-06 ASSESSMENT — PAIN SCALES - GENERAL: PAINLEVEL: MILD PAIN (2)

## 2024-03-13 NOTE — PROGRESS NOTES
Outpatient Physical Therapy Discharge Note     Patient: Lisa Angeles  : 1955    Beginning/End Dates of Reporting Period:  4/15/21 to 2021    Referring Provider: Dr. Ritter    Therapy Diagnosis: Chronic B LBP with R sciatica     Client Self Report: Pt reports mild intermittent R LBP and notes shes back to her baseline level of discomfort.    Objective Measurements:  Objective Measure: spinal mechanics  Details: no SIJ dsfx  Objective Measure: palpation  Details: minimal remaining soft tissue tension R lumbar                              Outcome Measures (most recent score):      Goals:  Goal Identifier STG 1   Goal Description Decreased pain when at its worst was 6/10   Target Date 21   Date Met  21   Progress:     Goal Identifier LTG 1   Goal Description She will demonstrate independence with home exercise program   Target Date 21   Date Met  21   Progress:     Goal Identifier LTG 2   Goal Description Patient will be able to walk 1+ miles without significant increased back pain 75% of the time   Target Date 21   Date Met  21   Progress:     Goal Identifier     Goal Description     Target Date     Date Met      Progress:     Goal Identifier     Goal Description     Target Date     Date Met      Progress:     Goal Identifier     Goal Description     Target Date     Date Met      Progress:     Goal Identifier     Goal Description     Target Date     Date Met      Progress:     Goal Identifier     Goal Description     Target Date     Date Met      Progress:     Progress Toward Goals:   Progress this reporting period: PT goals are met          Plan:  Discharge from therapy.    Discharge:    Reason for Discharge: Patient has met all goals.    Equipment Issued: none    Discharge Plan: Patient to continue home program.  
PAST MEDICAL HISTORY:  No pertinent past medical history

## 2024-04-03 ENCOUNTER — TELEPHONE (OUTPATIENT)
Dept: FAMILY MEDICINE | Facility: OTHER | Age: 69
End: 2024-04-03

## 2024-04-03 DIAGNOSIS — G89.29 CHRONIC BILATERAL LOW BACK PAIN WITH RIGHT-SIDED SCIATICA: ICD-10-CM

## 2024-04-03 DIAGNOSIS — M54.41 CHRONIC BILATERAL LOW BACK PAIN WITH RIGHT-SIDED SCIATICA: ICD-10-CM

## 2024-04-03 DIAGNOSIS — F41.9 ANXIETY: ICD-10-CM

## 2024-04-03 DIAGNOSIS — M81.0 AGE-RELATED OSTEOPOROSIS WITHOUT CURRENT PATHOLOGICAL FRACTURE: ICD-10-CM

## 2024-04-03 NOTE — TELEPHONE ENCOUNTER
Wellbutrin      Last Written Prescription Date:  1/9/24  Last Fill Quantity: 30,   # refills: 2  Last Office Visit: 2/6/24  Future Office visit:    Next 5 appointments (look out 90 days)      May 06, 2024 11:00 AM  (Arrive by 10:45 AM)  SHORT with Carolyn Ritter MD  [facility] ([facility] ) [address]  [place] [address]  [phone]     Brayan 10, 2024 10:00 AM  (Arrive by 9:45 AM)  PHYSICAL with Carolyn Ritter MD  [facility] ([facility] ) [address]  [place] [address]  [phone]             Routing refill request to provider for review/approval because:      [facility]      Last Written Prescription Date:  1/4/24  Last Fill Quantity: 3,   # refills: 1  Last Office Visit: 2/6/24  Future Office visit:    Next 5 appointments (look out 90 days)      May 06, 2024 11:00 AM  (Arrive by 10:45 AM)  [facility] [address]  [place] [address]  [phone]     Brayan 10, 2024 10:00 AM  (Arrive by 9:45 AM)  PHYSICAL with Carolyn Ritter MD  [facility] ([facility] ) [address]  [place] [address]  [phone]             Routing refill request to provider for review/approval because:

## 2024-04-03 NOTE — TELEPHONE ENCOUNTER
Reason for call:  Medication      Have you contacted your pharmacy? No   If patient has contacted Pharmacy and it has been over 72hrs, continue to #2  Medication ibuprofen 800 mg   What Pharmacy do you use? [facility]      (Please note that the turn-around-time for prescriptions is 72 business hours; I am sending your request at this time. SEND TO  [facility]  )

## 2024-04-04 RX ORDER — IBUPROFEN 800 MG/1
TABLET, FILM COATED ORAL
Qty: 90 TABLET | Refills: 2 | Status: SHIPPED | OUTPATIENT
Start: 2024-04-04

## 2024-04-05 RX ORDER — BUPROPION HYDROCHLORIDE 150 MG/1
TABLET ORAL
Qty: 30 TABLET | Refills: 5 | Status: SHIPPED | OUTPATIENT
Start: 2024-04-05

## 2024-04-05 RX ORDER — IBANDRONATE SODIUM 150 MG/1
TABLET, FILM COATED ORAL
Qty: 3 TABLET | Refills: 3 | Status: SHIPPED | OUTPATIENT
Start: 2024-04-05

## 2024-04-05 NOTE — TELEPHONE ENCOUNTER
Bisphosphonates [id number]/2024 08:52 AM   Protocol Details Dexa scan completed in the past 48-months

## 2024-04-23 DIAGNOSIS — M54.31 SCIATICA OF RIGHT SIDE: ICD-10-CM

## 2024-04-23 DIAGNOSIS — G89.29 CHRONIC BILATERAL LOW BACK PAIN WITH RIGHT-SIDED SCIATICA: ICD-10-CM

## 2024-04-23 DIAGNOSIS — M54.41 CHRONIC BILATERAL LOW BACK PAIN WITH RIGHT-SIDED SCIATICA: ICD-10-CM

## 2024-04-23 NOTE — TELEPHONE ENCOUNTER
Gabapentin      Last Written Prescription Date:  1/9/24  Last Fill Quantity: 270,   # refills: 0  Last Office Visit: 2/6/24  Future Office visit:    Next 5 appointments (look out 90 days)      May 06, 2024 11:00 AM  (Arrive by 10:45 AM)  SHORT with Carolyn Ritter MD  [facility] ([facility] ) [address]  [place] [address]  [phone]     Brayan 10, 2024 10:00 AM  (Arrive by 9:40 AM)  Adult Preventative Visit with Carolyn Ritter MD  [facility] ([facility] ) [address]  [place] [address]  [phone]             Routing refill request to provider for review/approval because:

## 2024-04-24 RX ORDER — GABAPENTIN 100 MG/1
CAPSULE ORAL
Qty: 270 CAPSULE | Refills: 0 | Status: SHIPPED | OUTPATIENT
Start: 2024-04-24 | End: 2024-05-06

## 2024-04-26 DIAGNOSIS — Z12.31 VISIT FOR SCREENING MAMMOGRAM: Primary | ICD-10-CM

## 2024-05-03 NOTE — PROGRESS NOTES
"  Assessment & Plan     1. Hyperlipidemia, unspecified hyperlipidemia type  No changes, recheck labs in three months.    2. Benign essential hypertension  No changes    3. Chronic bilateral low back pain with right-sided sciatica  Will increase gabapentin to 400 mg nightly, new script sent.  Patient has used flexeril in the past, will refill at lower dose to use only at night as needed.    - gabapentin (NEURONTIN) 100 MG capsule; Take 4 capsules (400 mg) by mouth at bedtime  Dispense: 360 capsule; Refill: 1  - cyclobenzaprine (FLEXERIL) 5 MG tablet; Take 1 tablet (5 mg) by mouth nightly as needed for muscle spasms  Dispense: 30 tablet; Refill: 1    4. Sciatica of right side  - gabapentin (NEURONTIN) 100 MG capsule; Take 4 capsules (400 mg) by mouth at bedtime  Dispense: 360 capsule; Refill: 1  - cyclobenzaprine (FLEXERIL) 5 MG tablet; Take 1 tablet (5 mg) by mouth nightly as needed for muscle spasms  Dispense: 30 tablet; Refill: 1       The longitudinal plan of care for the diagnosis(es)/condition(s) as documented were addressed during this visit. Due to the added complexity in care, I will continue to support Lisa in the subsequent management and with ongoing continuity of care.       BMI  Estimated body mass index is 26.83 kg/m  as calculated from the following:    Height as of this encounter: 1.549 m (5' 1\").    Weight as of this encounter: 64.4 kg (142 lb).       Return in about 3 months (around 8/6/2024) for Chronic Disease Management.      Rodrigue Gonzalez is a 69 year old, presenting for the following health issues:  Hypertension and Lipids        5/6/2024    10:52 AM   Additional Questions   Roomed by enrique   Accompanied by none     HPI     Hyperlipidemia Follow-Up    Are you regularly taking any medication or supplement to lower your cholesterol?   Yes- Lipitor 10 mg  Are you having muscle aches or other side effects that you think could be caused by your cholesterol lowering medication?  " "No    Hypertension Follow-up    Do you check your blood pressure regularly outside of the clinic? No   Are you following a low salt diet? Yes  Are your blood pressures ever more than 140 on the top number (systolic) OR more   than 90 on the bottom number (diastolic), for example 140/90? No  Chronic/Recurring Back Pain Follow Up    Where is your back pain located? (Select all that apply) low back right  How would you describe your back pain?  dull ache, shooting, and stabbing  Where does your back pain spread? the right buttock, the right  thigh, the right  knee, and the right foot  Since your last clinic visit for back pain, how has your pain changed? always present, but gets better and worse  Does your back pain interfere with your job? Not applicable  Since your last visit, have you tried any new treatment? No  Patient states back spasms at night are worse.  She did try taking gabapentin 400 mg one night, and that did help some.          Review of Systems  Constitutional, HEENT, cardiovascular, pulmonary, gi and gu systems are negative, except as otherwise noted.      Objective    /68 (BP Location: Left arm, Patient Position: Chair, Cuff Size: Adult Regular)   Pulse 68   Temp 97.8  F (36.6  C) (Tympanic)   Resp 16   Ht 1.549 m (5' 1\")   Wt 64.4 kg (142 lb)   SpO2 100%   BMI 26.83 kg/m    Body mass index is 26.83 kg/m .  Physical Exam   GENERAL: alert and no distress  PSYCH: mentation appears normal, affect normal/bright          Signed Electronically by: Carolyn Ritter MD    "

## 2024-05-06 ENCOUNTER — OFFICE VISIT (OUTPATIENT)
Dept: FAMILY MEDICINE | Facility: OTHER | Age: 69
End: 2024-05-06
Attending: FAMILY MEDICINE
Payer: COMMERCIAL

## 2024-05-06 VITALS
WEIGHT: 142 LBS | SYSTOLIC BLOOD PRESSURE: 114 MMHG | HEIGHT: 61 IN | BODY MASS INDEX: 26.81 KG/M2 | HEART RATE: 68 BPM | OXYGEN SATURATION: 100 % | TEMPERATURE: 97.8 F | RESPIRATION RATE: 16 BRPM | DIASTOLIC BLOOD PRESSURE: 68 MMHG

## 2024-05-06 DIAGNOSIS — M54.31 SCIATICA OF RIGHT SIDE: ICD-10-CM

## 2024-05-06 DIAGNOSIS — E78.5 HYPERLIPIDEMIA, UNSPECIFIED HYPERLIPIDEMIA TYPE: Primary | ICD-10-CM

## 2024-05-06 DIAGNOSIS — M54.41 CHRONIC BILATERAL LOW BACK PAIN WITH RIGHT-SIDED SCIATICA: ICD-10-CM

## 2024-05-06 DIAGNOSIS — I10 BENIGN ESSENTIAL HYPERTENSION: ICD-10-CM

## 2024-05-06 DIAGNOSIS — G89.29 CHRONIC BILATERAL LOW BACK PAIN WITH RIGHT-SIDED SCIATICA: ICD-10-CM

## 2024-05-06 PROCEDURE — 99214 OFFICE O/P EST MOD 30 MIN: CPT | Performed by: FAMILY MEDICINE

## 2024-05-06 PROCEDURE — G2211 COMPLEX E/M VISIT ADD ON: HCPCS | Performed by: FAMILY MEDICINE

## 2024-05-06 PROCEDURE — G0463 HOSPITAL OUTPT CLINIC VISIT: HCPCS

## 2024-05-06 RX ORDER — GABAPENTIN 100 MG/1
400 CAPSULE ORAL AT BEDTIME
Qty: 360 CAPSULE | Refills: 1 | Status: SHIPPED | OUTPATIENT
Start: 2024-05-06

## 2024-05-06 RX ORDER — CYCLOBENZAPRINE HCL 5 MG
5 TABLET ORAL
Qty: 30 TABLET | Refills: 1 | Status: SHIPPED | OUTPATIENT
Start: 2024-05-06 | End: 2024-08-13

## 2024-05-06 ASSESSMENT — PAIN SCALES - GENERAL: PAINLEVEL: MILD PAIN (2)

## 2024-05-07 NOTE — TELEPHONE ENCOUNTER
.p   Follow up SLP recommendation for diet. Treat Oral candidiasis as per medicine team. No ENT intervention at this time. ENT will sign off.

## 2024-05-10 ENCOUNTER — TELEPHONE (OUTPATIENT)
Dept: FAMILY MEDICINE | Facility: OTHER | Age: 69
End: 2024-05-10

## 2024-05-10 ENCOUNTER — HOSPITAL ENCOUNTER (EMERGENCY)
Facility: HOSPITAL | Age: 69
Discharge: HOME | End: 2024-05-10
Attending: NURSE PRACTITIONER | Admitting: NURSE PRACTITIONER
Payer: MEDICARE

## 2024-05-10 ENCOUNTER — APPOINTMENT (OUTPATIENT)
Dept: GENERAL RADIOLOGY | Facility: HOSPITAL | Age: 69
End: 2024-05-10
Attending: NURSE PRACTITIONER
Payer: MEDICARE

## 2024-05-10 VITALS
TEMPERATURE: 98.8 F | HEIGHT: 62 IN | OXYGEN SATURATION: 96 % | BODY MASS INDEX: 26.13 KG/M2 | SYSTOLIC BLOOD PRESSURE: 108 MMHG | DIASTOLIC BLOOD PRESSURE: 68 MMHG | HEART RATE: 67 BPM | WEIGHT: 141.98 LBS | RESPIRATION RATE: 16 BRPM

## 2024-05-10 DIAGNOSIS — B34.9 ACUTE VIRAL SYNDROME: Primary | ICD-10-CM

## 2024-05-10 LAB — GROUP A STREP BY PCR: NOT DETECTED

## 2024-05-10 PROCEDURE — G0463 HOSPITAL OUTPT CLINIC VISIT: HCPCS | Mod: 25

## 2024-05-10 PROCEDURE — 99213 OFFICE O/P EST LOW 20 MIN: CPT | Performed by: NURSE PRACTITIONER

## 2024-05-10 PROCEDURE — 71045 X-RAY EXAM CHEST 1 VIEW: CPT

## 2024-05-10 PROCEDURE — 87651 STREP A DNA AMP PROBE: CPT | Performed by: NURSE PRACTITIONER

## 2024-05-10 ASSESSMENT — ENCOUNTER SYMPTOMS
MYALGIAS: 0
ABDOMINAL PAIN: 0
FEVER: 0
TROUBLE SWALLOWING: 0
PSYCHIATRIC NEGATIVE: 1
NECK PAIN: 0
EYE REDNESS: 0
CHILLS: 0
HEADACHES: 0
COUGH: 1
EYE DISCHARGE: 0
DIARRHEA: 0
RHINORRHEA: 1
SORE THROAT: 1
SHORTNESS OF BREATH: 0
VOMITING: 1
NECK STIFFNESS: 0

## 2024-05-10 ASSESSMENT — ACTIVITIES OF DAILY LIVING (ADL): ADLS_ACUITY_SCORE: 35

## 2024-05-10 NOTE — ED TRIAGE NOTES
Pt presents with c/o cough and sore throat. Sx started Sunday. Pt had an johann with primary and was told to call primary back if things got worse but they had no openings today. Denies known exposures. Denies hx of asthma, copd, or seasonal allergies. Pt had vitamins. Pt would not like multiplex testing at this time.

## 2024-05-10 NOTE — DISCHARGE INSTRUCTIONS
Symptomatic treatments recommended.  -Discussed that antibiotics would not help symptoms of viral URI. Education provided on symptoms of secondary bacterial infection such as new fever, chills, rigors, shortness of breath, increased work of breathing, that can occur with viral URI and need for further evaluation, if they occur.   - Ensure you are staying hydrated by drinking plenty of fluids or eating foods such as popsicles, jello, pudding.  - Honey can be soothing for sore throat  - Warm salt water gurgles can help soothe sore throat  - Rest  - Humidifier can help with congestion and help keep mucus membranes such as throat and nose from drying out.  - Sleeping slightly propped up can help with congestion and postnasal drainage that can worsen cough at bedtime.  - As long as you have never been told to take Tylenol and/or Ibuprofen you can use them to manage fever and body aches per package instructions  Make sure you eat when you take ibuprofen to avoid stomach upset.  - OTC cough medications per package instructions to help with cough. Check to see if the cough/cold medication already has acetaminophen (Tylenol) in it. If it does avoid taking additional Tylenol.  - If sudden onset of new fever, worsening symptoms return for further evaluation.  - OTC nasal steroid such as Flonase can help decrease sinus inflammation to help with congestion.  - Education provided on symptoms of post-viral bacterial infections including ear infection and pneumonia. This would require re-evaluation for treatment.    Schedule follow-up with primary care provider to review enlargement of aorta seen on chest XR.    Follow-up with primary care provider or return to urgent care/ED with any worsening in condition or additional concerns.
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
## 2024-05-10 NOTE — TELEPHONE ENCOUNTER
Seen ER for UR symptoms and was noted on CXR enlarged aorta.      Finding from CXR: FINDINGS: Heart and pulmonary vasculature are normal. There is ectasia  of the aorta. Lungs are clear. No pleural effusion is seen.    Pt scheduled with PCP on 05/23/2023.  Would you like to see pt sooner?       Per assessment and Plan on ED: On x-ray ectasia of the aorta was seen.  Patient to follow-up with PCP for further evaluation, patient states that she has a follow-up with PCP on 6/10/2024 and will review it then.

## 2024-05-17 ENCOUNTER — TELEPHONE (OUTPATIENT)
Dept: FAMILY MEDICINE | Facility: OTHER | Age: 69
End: 2024-05-17

## 2024-05-17 DIAGNOSIS — J01.90 ACUTE SINUSITIS WITH SYMPTOMS > 10 DAYS: Primary | ICD-10-CM

## 2024-05-17 RX ORDER — AMOXICILLIN 875 MG
875 TABLET ORAL 2 TIMES DAILY
Qty: 20 TABLET | Refills: 0 | Status: SHIPPED | OUTPATIENT
Start: 2024-05-17 | End: 2024-05-27

## 2024-05-17 NOTE — TELEPHONE ENCOUNTER
12:42 PM    Reason for Call: Phone Call    Description: patient is calling about having cold symptoms, sore throat, congestion and saw you 2 weeks ago. She is wondering if you can prescribe something for her?  Leave a message if you can't get ahold of her.    Was an appointment offered for this call? No  If yes : Appointment type              Date    Preferred method for responding to this message: Telephone Call  What is your phone number ? [phone]     If we cannot reach you directly, may we leave a detailed response at the number you provided? Yes    Can this message wait until your PCP/provider returns, if available today? YES, No provider is in today    DEANGELO EARL

## 2024-05-20 NOTE — PROGRESS NOTES
"  Assessment & Plan     1. Abnormality of thoracic aorta  Likely normal and nothing to worry about, will order echo.  Follow-up with results when available.  - Echocardiogram Complete     The longitudinal plan of care for the diagnosis(es)/condition(s) as documented were addressed during this visit. Due to the added complexity in care, I will continue to support Lisa in the subsequent management and with ongoing continuity of care.     BMI  Estimated body mass index is 25.57 kg/m  as calculated from the following:    Height as of this encounter: 1.575 m (5' 2\").    Weight as of this encounter: 63.4 kg (139 lb 12.8 oz).       Return if symptoms worsen or fail to improve.      Subjective   Lisa is a 69 year old, presenting for the following health issues:  Recurrent Erosion Follow Up (Enlarged aorta on chest xray)    History of Present Illness       Reason for visit:  Enlarged aorta  Symptoms include:  None  What makes it worse:  No    She eats 0-1 servings of fruits and vegetables daily.She consumes 0 sweetened beverage(s) daily.She exercises with enough effort to increase her heart rate 9 or less minutes per day.  She exercises with enough effort to increase her heart rate 3 or less days per week.   She is taking medications regularly.       ED/UC Followup:    Facility:  [facility]  Date of visit: 05/10/24  Reason for visit: Acute viral syndrome  Current Status: having no symptoms    CXR report is as follows, patient was asked to follow-up:  PROCEDURE: XR CHEST PORT 1 VIEW 5/10/2024 2:10 PM     HISTORY: cough     COMPARISONS: 12/7/2022.      TECHNIQUE: Single portable view.     FINDINGS: Heart and pulmonary vasculature are normal. There is ectasia  of the aorta. Lungs are clear. No pleural effusion is seen.                                                                        IMPRESSION: No acute infiltrate.     CLARITA LUNA MD         Review of Systems  Constitutional, HEENT, cardiovascular, " "pulmonary, gi and gu systems are negative, except as otherwise noted.      Objective    /70   Pulse 71   Temp 98.4  F (36.9  C) (Tympanic)   Resp 16   Ht 1.575 m (5' 2\")   Wt 63.4 kg (139 lb 12.8 oz)   SpO2 99%   BMI 25.57 kg/m    Body mass index is 25.57 kg/m .  Physical Exam   GENERAL: alert and no distress  PSYCH: mentation appears normal, affect normal/bright          Signed Electronically by: Carolyn Ritter MD    "

## 2024-05-23 ENCOUNTER — OFFICE VISIT (OUTPATIENT)
Dept: FAMILY MEDICINE | Facility: OTHER | Age: 69
End: 2024-05-23
Attending: FAMILY MEDICINE
Payer: COMMERCIAL

## 2024-05-23 VITALS
BODY MASS INDEX: 25.73 KG/M2 | TEMPERATURE: 98.4 F | SYSTOLIC BLOOD PRESSURE: 110 MMHG | DIASTOLIC BLOOD PRESSURE: 70 MMHG | HEART RATE: 71 BPM | RESPIRATION RATE: 16 BRPM | WEIGHT: 139.8 LBS | HEIGHT: 62 IN | OXYGEN SATURATION: 99 %

## 2024-05-23 DIAGNOSIS — Q25.40 ABNORMALITY OF THORACIC AORTA: Primary | ICD-10-CM

## 2024-05-23 PROCEDURE — 99212 OFFICE O/P EST SF 10 MIN: CPT | Performed by: FAMILY MEDICINE

## 2024-05-23 PROCEDURE — G2211 COMPLEX E/M VISIT ADD ON: HCPCS | Performed by: FAMILY MEDICINE

## 2024-05-23 PROCEDURE — G0463 HOSPITAL OUTPT CLINIC VISIT: HCPCS | Performed by: FAMILY MEDICINE

## 2024-05-23 ASSESSMENT — PAIN SCALES - GENERAL: PAINLEVEL: MILD PAIN (3)

## 2024-05-29 ENCOUNTER — HOSPITAL ENCOUNTER (OUTPATIENT)
Dept: CARDIOLOGY | Facility: HOSPITAL | Age: 69
Discharge: HOME | End: 2024-05-29
Attending: FAMILY MEDICINE | Admitting: INTERNAL MEDICINE
Payer: MEDICARE

## 2024-05-29 DIAGNOSIS — Q25.40 ABNORMALITY OF THORACIC AORTA: ICD-10-CM

## 2024-05-29 LAB — LVEF ECHO: NORMAL

## 2024-05-29 PROCEDURE — 93306 TTE W/DOPPLER COMPLETE: CPT

## 2024-05-29 PROCEDURE — 93306 TTE W/DOPPLER COMPLETE: CPT | Mod: 26 | Performed by: INTERNAL MEDICINE

## 2024-05-30 ENCOUNTER — TELEPHONE (OUTPATIENT)
Dept: FAMILY MEDICINE | Facility: OTHER | Age: 69
End: 2024-05-30

## 2024-05-30 DIAGNOSIS — Q25.40 ABNORMALITY OF THORACIC AORTA: Primary | ICD-10-CM

## 2024-06-07 ENCOUNTER — HOSPITAL ENCOUNTER (OUTPATIENT)
Dept: CT IMAGING | Facility: HOSPITAL | Age: 69
Discharge: HOME | End: 2024-06-07
Attending: FAMILY MEDICINE | Admitting: FAMILY MEDICINE
Payer: MEDICARE

## 2024-06-07 DIAGNOSIS — Q25.40 ABNORMALITY OF THORACIC AORTA: ICD-10-CM

## 2024-06-07 PROCEDURE — 250N000011 HC RX IP 250 OP 636: Performed by: STUDENT IN AN ORGANIZED HEALTH CARE EDUCATION/TRAINING PROGRAM

## 2024-06-07 PROCEDURE — 71275 CT ANGIOGRAPHY CHEST: CPT | Mod: MG

## 2024-06-07 RX ORDER — IOPAMIDOL 755 MG/ML
100 INJECTION, SOLUTION INTRAVASCULAR ONCE
Status: COMPLETED | OUTPATIENT
Start: 2024-06-07 | End: 2024-06-07

## 2024-06-07 RX ADMIN — IOPAMIDOL 100 ML: 755 INJECTION, SOLUTION INTRAVENOUS at 11:23

## 2024-06-10 ENCOUNTER — OFFICE VISIT (OUTPATIENT)
Dept: FAMILY MEDICINE | Facility: OTHER | Age: 69
End: 2024-06-10
Attending: FAMILY MEDICINE
Payer: COMMERCIAL

## 2024-06-10 ENCOUNTER — TELEPHONE (OUTPATIENT)
Dept: FAMILY MEDICINE | Facility: OTHER | Age: 69
End: 2024-06-10

## 2024-06-10 VITALS
BODY MASS INDEX: 25.58 KG/M2 | HEART RATE: 72 BPM | OXYGEN SATURATION: 98 % | TEMPERATURE: 98.1 F | RESPIRATION RATE: 16 BRPM | HEIGHT: 62 IN | WEIGHT: 139 LBS | SYSTOLIC BLOOD PRESSURE: 112 MMHG | DIASTOLIC BLOOD PRESSURE: 68 MMHG

## 2024-06-10 DIAGNOSIS — Z00.00 ENCOUNTER FOR MEDICARE ANNUAL WELLNESS EXAM: Primary | ICD-10-CM

## 2024-06-10 DIAGNOSIS — Z23 NEED FOR SHINGLES VACCINE: ICD-10-CM

## 2024-06-10 DIAGNOSIS — M81.0 AGE-RELATED OSTEOPOROSIS WITHOUT CURRENT PATHOLOGICAL FRACTURE: ICD-10-CM

## 2024-06-10 PROCEDURE — G0463 HOSPITAL OUTPT CLINIC VISIT: HCPCS

## 2024-06-10 PROCEDURE — G0439 PPPS, SUBSEQ VISIT: HCPCS | Performed by: FAMILY MEDICINE

## 2024-06-10 RX ORDER — RESPIRATORY SYNCYTIAL VIRUS VACCINE 120MCG/0.5
0.5 KIT INTRAMUSCULAR ONCE
Qty: 1 EACH | Refills: 0 | Status: CANCELLED | OUTPATIENT
Start: 2024-06-10 | End: 2024-06-10

## 2024-06-10 SDOH — HEALTH STABILITY: PHYSICAL HEALTH: ON AVERAGE, HOW MANY MINUTES DO YOU ENGAGE IN EXERCISE AT THIS LEVEL?: 0 MIN

## 2024-06-10 SDOH — HEALTH STABILITY: PHYSICAL HEALTH: ON AVERAGE, HOW MANY DAYS PER WEEK DO YOU ENGAGE IN MODERATE TO STRENUOUS EXERCISE (LIKE A BRISK WALK)?: 0 DAYS

## 2024-06-10 ASSESSMENT — PAIN SCALES - GENERAL: PAINLEVEL: MILD PAIN (2)

## 2024-06-10 ASSESSMENT — SOCIAL DETERMINANTS OF HEALTH (SDOH): HOW OFTEN DO YOU GET TOGETHER WITH FRIENDS OR RELATIVES?: ONCE A WEEK

## 2024-06-10 NOTE — TELEPHONE ENCOUNTER
11:44 AM    Reason for Call: Phone Call    Description: Pt states they need to go to the [facility] for their second dose of shingles shot. Needing referral sent over to have this done at that clinic.     Was an appointment offered for this call? No  If yes : Appointment type              Date    Preferred method for responding to this message: Telephone Call  What is your phone number ?[phone]     If we cannot reach you directly, may we leave a detailed response at the number you provided? Yes    Can this message wait until your PCP/provider returns, if available today? Not applicable    Cherry Wan
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
## 2024-06-10 NOTE — COMMUNITY RESOURCES LIST (ENGLISH)
Sherita 10, 2024           YOUR PERSONALIZED LIST OF SERVICES & PROGRAMS           & SHELTER    Housing      [facility] for families  [address] [place] [address] (Distance: 5.3 miles)  Phone: [phone]  Language: English  Fee: Free      [facility] - Shelter for individuals  [address] [place] [address] (Distance: 5.3 miles)  Phone: [phone]  Language: English  Fee: Free      [facility]  Phone: [phone]  Website: [email or web address]  Language: English, Hmong, Oromo, Jamaican, Omani  Hours: Mon 9:00 AM - 5:00 PM Tue 9:00 AM - 5:00 PM Wed 9:00 AM - 5:00 PM Thu 9:00 AM - 5:00 PM Fri 9:00 AM - 5:00 PM  Fee: Insurance  Accessibility: Blind accommodation, Deaf or hard of hearing, Translation services  Transportation Options: Free transportation    Case Management      [facility] search assistance  [address] [place] [address] (Distance: 4.8 miles)  Language: English  Fee: Free      [employer] - Housing Stabilization Services  Phone: [phone]  Website: https://homebasemn.com/  Language: English  Hours: Mon 8:00 AM - 4:00 PM Tue 8:00 AM - 4:00 PM Wed 8:00 AM - 4:00 PM Thu 8:00 AM - 4:00 PM Fri 8:00 AM - 4:00 PM  Fee: Free  Accessibility: Blind accommodation, Deaf or hard of hearing  Transportation Options: Free transportation    Drop-In Services      [facility] - MAIL SERVICE FOR THE HOMELESS  Phone: [phone]  Website: [email or web address] [email or web address]               IMPORTANT NUMBERS & WEBSITES        Emergency Services  911  .   United Way  211 http://211unitedway.org  .   Poison Control  (565) 240-9786 http://[place]poison.org http://wis[place]poison.org  .     Suicide and Crisis Lifeline  988 http://988lifeline.org  .   Childhelp National Child Abuse Hotline  287.845.5848 http://Childhelphotline.org   .   National Sexual Assault Hotline  (803)  425-6047 (HOPE) http://Rainn.org   .     National Runaway Safeline  (275) 355-8111 (RUNAWAY) http://Aviga SystemsruAkampus.The [address] Mall  .   Pregnancy & Postpartum Support  Call/text 201-917-3918  [place]: http://ppsupportmn.org  [place]: http://psichapters.com/wi  .   Substance Abuse National Helpline ([facility])  961-303-HELP (7965) http://Findtreatment.gov   .                DISCLAIMER: These resources have been generated via the LocalView Platform. LocalView does not endorse any service providers mentioned in this resource list. LocalView does not guarantee that the services mentioned in this resource list will be available to you or will improve your health or wellness.    [facility]

## 2024-07-09 ENCOUNTER — TELEPHONE (OUTPATIENT)
Dept: FAMILY MEDICINE | Facility: OTHER | Age: 69
End: 2024-07-09

## 2024-07-09 DIAGNOSIS — M54.41 CHRONIC BILATERAL LOW BACK PAIN WITH RIGHT-SIDED SCIATICA: Primary | ICD-10-CM

## 2024-07-09 DIAGNOSIS — M54.31 SCIATICA OF RIGHT SIDE: ICD-10-CM

## 2024-07-09 DIAGNOSIS — G89.29 CHRONIC BILATERAL LOW BACK PAIN WITH RIGHT-SIDED SCIATICA: Primary | ICD-10-CM

## 2024-07-09 NOTE — TELEPHONE ENCOUNTER
12:50 PM    Reason for Call: Phone Call    Description: Patient called in requesting a referral for PT in [facility] for her lower back and sciatica. Please call patient back.     Was an appointment offered for this call? No  If yes : Appointment type              Date    Preferred method for responding to this message: Telephone Call  What is your phone number ? [phone]     If we cannot reach you directly, may we leave a detailed response at the number you provided? Yes    Can this message wait until your PCP/provider returns, if available today? Not applicable, PROVIDER IN CLINIC    Denisse Kim   with patient

## 2024-07-24 ENCOUNTER — THERAPY VISIT (OUTPATIENT)
Dept: PHYSICAL THERAPY | Facility: OTHER | Age: 69
End: 2024-07-24
Attending: FAMILY MEDICINE
Payer: MEDICARE

## 2024-07-24 DIAGNOSIS — M54.31 SCIATICA OF RIGHT SIDE: ICD-10-CM

## 2024-07-24 DIAGNOSIS — M54.41 CHRONIC BILATERAL LOW BACK PAIN WITH RIGHT-SIDED SCIATICA: ICD-10-CM

## 2024-07-24 DIAGNOSIS — G89.29 CHRONIC BILATERAL LOW BACK PAIN WITH RIGHT-SIDED SCIATICA: ICD-10-CM

## 2024-07-24 PROCEDURE — 97162 PT EVAL MOD COMPLEX 30 MIN: CPT | Mod: GP

## 2024-07-24 NOTE — PROGRESS NOTES
"PHYSICAL THERAPY EVALUATION  Type of Visit: Evaluation       Fall Risk Screen:  Fall screen completed by: PT  Have you fallen 2 or more times in the past year?: Yes  Have you fallen and had an injury in the past year?: No  Is patient a fall risk?: No    Subjective       Presenting condition or subjective complaint: low right sided back with siatica both legs  Date of onset: 07/09/24    Relevant medical history:     Dates & types of surgery:      Prior diagnostic imaging/testing results: X-ray     Prior therapy history for the same diagnosis, illness or injury: Yes      Prior Level of Function  Transfers: Independent  Ambulation: Independent  ADL: Independent  IADL:     Living Environment  Social support: With a significant other or spouse   Type of home: House; 1 level   Stairs to enter the home: Yes       Ramp: No   Stairs inside the home: No       Help at home: None  Equipment owned:       Employment: No    Hobbies/Interests:      Patient goals for therapy: most everything    Pain assessment:  Patient reports a longstanding history of back pain.  She states she was injured at work on 9/14/2009 when working at [employer].  She has not had any surgeries.  She has had spinal injections which have helped somewhat she states.  She has seen back specialist in the past however no one recently.  She has had previous physical therapy with her last PT being approximately 2 years ago she states.  Patient notes her back pain as progressively worsening especially in the last 6 months.  She is now referred to physical therapy by her primary care provider     Objective   LUMBAR SPINE EVALUATION  PAIN: Pain Level at Rest: 2/10  Pain Level with Use: 8/10  Pain Location: Chief the patient with pain is along the right SI joint/buttock region she also notes \"spasms \"and pain into her lower extremity  Pain Quality: Aching, Dull, Sharp, and Shooting  Pain Frequency: constant  Pain is Exacerbated By: Lifting, sitting, traveling, " walking  Pain is Relieved By: cold, rest, and meds  Pain Progression: Unchanged  INTEGUMENTARY (edema, incisions): WFL  POSTURE:  Increased CT kyphosis and lumbar lordosis.  The left shoulder and iliac crest were elevated as compared to the right  GAIT:   Weightbearing Status: WBAT  Assistive Device(s): None  Gait Deviations: WFL  BALANCE/PROPRIOCEPTION: WFL  WEIGHTBEARING ALIGNMENT: WFL  NON-WEIGHTBEARING ALIGNMENT:    ROM:  Trunk active range of motion: Flexion = 0 to 63 degrees with right low back pain, back bending = 0 to 19 degrees with right low back pain, right sidebending = 0 to 21 degrees, left sidebending = 0 to 21 degrees with contralateral pull  PELVIC/SI SCREEN:  Test positive for a left on left anterior sacral torsion dysfunction  STRENGTH:  She was able to withstand minimal to moderate resistance with her trunk flexors and moderate resistance with her trunk extensors    MYOTOMES:  She was able to heel and toe walk well  DTR S:   CORD SIGNS: WNL  DERMATOMES: WNL  NEURAL TENSION: Lumbar WNL  FLEXIBILITY:  Hypomobility noted especially in the hamstring, gastroc, piriformis and quadricep muscles  LUMBAR/HIP Special Tests: WFL   PELVIS/SI SPECIAL TESTS:  Positive for on the left on left AST  FUNCTIONAL TESTS:   PALPATION:  Point tenderness over the right SI joint.  Significant soft tissue tension tenderness to palpation along the right piriformis/gluteal muscles.  Mild soft tissue tension tenderness along the right lumbar paraspinal muscles and quadratus lumborum muscles  SPINAL SEGMENTAL CONCLUSIONS: WFL      Assessment & Plan   CLINICAL IMPRESSIONS  Medical Diagnosis: Chronic bilateral low back pain with right-sided sciatica    Treatment Diagnosis: Chronic bilateral low back pain with right-sided sciatica   Impression/Assessment: Patient is a 69 year old female with right low back and lower extremity pain/spasms complaints.  The following significant findings have been identified: Pain, Decreased  ROM/flexibility, Decreased strength, Impaired muscle performance, and Decreased activity tolerance. These impairments interfere with their ability to perform self care tasks, recreational activities, household chores, driving , and community mobility as compared to previous level of function.     Clinical Decision Making (Complexity):  Clinical Presentation: Evolving/Changing  Clinical Presentation Rationale: based on medical and personal factors listed in PT evaluation  Clinical Decision Making (Complexity): Moderate complexity    PLAN OF CARE  Treatment Interventions:  Modalities: Cryotherapy, E-stim, Ultrasound  Interventions: Manual Therapy, Neuromuscular Re-education, Therapeutic Activity, Therapeutic Exercise, Self-Care/Home Management    Long Term Goals     PT Goal 1  Goal Identifier: STG 1  Goal Description: Decreased pain when at its worst to a 7/10  Target Date: 08/08/24  PT Goal 2  Goal Identifier: LTG 1  Goal Description: Patient will demonstrate independence with home exercise program  Target Date: 09/19/24  PT Goal 3  Goal Identifier: LTG 2  Goal Description: Patient was able to increase sitting tolerance to 60+ minutes  Target Date: 09/19/24      Frequency of Treatment: 2 times/week  Duration of Treatment: Up to 8 weeks    Recommended Referrals to Other Professionals:   Education Assessment:   Learner/Method: Patient;Listening;No Barriers to Learning  Education Comments: Patient education/discussion in today's objective findings along with recommended treatment plan    Risks and benefits of evaluation/treatment have been explained.   Patient/Family/caregiver agrees with Plan of Care.     Evaluation Time:     PT Eval, Moderate Complexity Minutes (78234): 45       Signing Clinician: Eloise Vanegas PT        [facility]                                                                                   OUTPATIENT PHYSICAL THERAPY      PLAN OF TREATMENT FOR OUTPATIENT  REHABILITATION   Patient's Last Name, First Name, Lisa Erickson YOB: 1955   Provider's Name   [facility]   Medical Record No.  [id number]     Onset Date: 07/09/24  Start of Care Date: 07/24/24     Medical Diagnosis:  Chronic bilateral low back pain with right-sided sciatica      PT Treatment Diagnosis:  Chronic bilateral low back pain with right-sided sciatica Plan of Treatment  Frequency/Duration: 2 times/week/ Up to 8 weeks    Certification date from 07/24/24 to 09/19/24         See note for plan of treatment details and functional goals     Eloise Vanegas, PT                         I CERTIFY THE NEED FOR THESE SERVICES FURNISHED UNDER        THIS PLAN OF TREATMENT AND WHILE UNDER MY CARE     (Physician attestation of this document indicates review and certification of the therapy plan).              Referring Provider:  Carolyn Ritter    Initial Assessment  See Epic Evaluation- Start of Care Date: 07/24/24

## 2024-07-26 ENCOUNTER — TELEPHONE (OUTPATIENT)
Dept: MAMMOGRAPHY | Facility: HOSPITAL | Age: 69
End: 2024-07-26

## 2024-07-26 ENCOUNTER — ANCILLARY PROCEDURE (OUTPATIENT)
Dept: MAMMOGRAPHY | Facility: OTHER | Age: 69
End: 2024-07-26
Attending: FAMILY MEDICINE
Payer: MEDICARE

## 2024-07-26 ENCOUNTER — HOSPITAL ENCOUNTER (OUTPATIENT)
Dept: BONE DENSITY | Facility: HOSPITAL | Age: 69
Discharge: HOME | End: 2024-07-26
Attending: FAMILY MEDICINE
Payer: MEDICARE

## 2024-07-26 DIAGNOSIS — M81.0 AGE-RELATED OSTEOPOROSIS WITHOUT CURRENT PATHOLOGICAL FRACTURE: ICD-10-CM

## 2024-07-26 DIAGNOSIS — Z12.31 VISIT FOR SCREENING MAMMOGRAM: ICD-10-CM

## 2024-07-26 PROCEDURE — 77080 DXA BONE DENSITY AXIAL: CPT

## 2024-07-26 PROCEDURE — 77063 BREAST TOMOSYNTHESIS BI: CPT | Mod: TC

## 2024-07-31 ENCOUNTER — THERAPY VISIT (OUTPATIENT)
Dept: PHYSICAL THERAPY | Facility: OTHER | Age: 69
End: 2024-07-31
Attending: FAMILY MEDICINE
Payer: MEDICARE

## 2024-07-31 DIAGNOSIS — G89.29 CHRONIC BILATERAL LOW BACK PAIN WITH RIGHT-SIDED SCIATICA: Primary | ICD-10-CM

## 2024-07-31 DIAGNOSIS — M54.41 CHRONIC BILATERAL LOW BACK PAIN WITH RIGHT-SIDED SCIATICA: Primary | ICD-10-CM

## 2024-07-31 PROCEDURE — 97035 APP MDLTY 1+ULTRASOUND EA 15: CPT | Mod: GP

## 2024-07-31 PROCEDURE — 97140 MANUAL THERAPY 1/> REGIONS: CPT | Mod: GP

## 2024-08-02 ENCOUNTER — THERAPY VISIT (OUTPATIENT)
Dept: PHYSICAL THERAPY | Facility: OTHER | Age: 69
End: 2024-08-02
Attending: FAMILY MEDICINE
Payer: MEDICARE

## 2024-08-02 DIAGNOSIS — G89.29 CHRONIC BILATERAL LOW BACK PAIN WITH RIGHT-SIDED SCIATICA: Primary | ICD-10-CM

## 2024-08-02 DIAGNOSIS — M54.41 CHRONIC BILATERAL LOW BACK PAIN WITH RIGHT-SIDED SCIATICA: Primary | ICD-10-CM

## 2024-08-02 PROCEDURE — 97140 MANUAL THERAPY 1/> REGIONS: CPT | Mod: GP

## 2024-08-02 PROCEDURE — 97035 APP MDLTY 1+ULTRASOUND EA 15: CPT | Mod: GP

## 2024-08-07 ENCOUNTER — THERAPY VISIT (OUTPATIENT)
Dept: PHYSICAL THERAPY | Facility: OTHER | Age: 69
End: 2024-08-07
Attending: FAMILY MEDICINE
Payer: MEDICARE

## 2024-08-07 DIAGNOSIS — G89.29 CHRONIC BILATERAL LOW BACK PAIN WITH RIGHT-SIDED SCIATICA: Primary | ICD-10-CM

## 2024-08-07 DIAGNOSIS — M54.41 CHRONIC BILATERAL LOW BACK PAIN WITH RIGHT-SIDED SCIATICA: Primary | ICD-10-CM

## 2024-08-07 PROCEDURE — 97140 MANUAL THERAPY 1/> REGIONS: CPT | Mod: GP

## 2024-08-07 PROCEDURE — 97035 APP MDLTY 1+ULTRASOUND EA 15: CPT | Mod: GP

## 2024-08-09 ENCOUNTER — THERAPY VISIT (OUTPATIENT)
Dept: PHYSICAL THERAPY | Facility: OTHER | Age: 69
End: 2024-08-09
Attending: FAMILY MEDICINE
Payer: MEDICARE

## 2024-08-09 DIAGNOSIS — M54.41 CHRONIC BILATERAL LOW BACK PAIN WITH RIGHT-SIDED SCIATICA: Primary | ICD-10-CM

## 2024-08-09 DIAGNOSIS — G89.29 CHRONIC BILATERAL LOW BACK PAIN WITH RIGHT-SIDED SCIATICA: Primary | ICD-10-CM

## 2024-08-09 PROCEDURE — 97035 APP MDLTY 1+ULTRASOUND EA 15: CPT | Mod: GP

## 2024-08-09 PROCEDURE — 97140 MANUAL THERAPY 1/> REGIONS: CPT | Mod: GP

## 2024-08-12 ENCOUNTER — TELEPHONE (OUTPATIENT)
Dept: FAMILY MEDICINE | Facility: OTHER | Age: 69
End: 2024-08-12

## 2024-08-12 DIAGNOSIS — M54.41 CHRONIC BILATERAL LOW BACK PAIN WITH RIGHT-SIDED SCIATICA: ICD-10-CM

## 2024-08-12 DIAGNOSIS — M54.31 SCIATICA OF RIGHT SIDE: ICD-10-CM

## 2024-08-12 DIAGNOSIS — G89.29 CHRONIC BILATERAL LOW BACK PAIN WITH RIGHT-SIDED SCIATICA: ICD-10-CM

## 2024-08-12 RX ORDER — HYDROCODONE BITARTRATE AND ACETAMINOPHEN 7.5; 325 MG/1; MG/1
1 TABLET ORAL EVERY 6 HOURS PRN
Qty: 30 TABLET | Refills: 0 | Status: SHIPPED | OUTPATIENT
Start: 2024-08-12

## 2024-08-12 NOTE — TELEPHONE ENCOUNTER
Reason for call:  Medication      Have you contacted your pharmacy? Yes CONTROLLED SUBSTANCE - PATIENT ALMOST OUT OF MEDICATION - PATIENT GOING OUT OF TOWN FOR 6 WEEKS STARTING 8-24-24  Medication HYDROCODONE  What Pharmacy do you use? [facility]      (Please note that the turn-around-time for prescriptions is 72 business hours; I am sending your request at this time. SEND TO appropriate Care Team Pool )

## 2024-08-13 DIAGNOSIS — M54.31 SCIATICA OF RIGHT SIDE: ICD-10-CM

## 2024-08-13 DIAGNOSIS — G89.29 CHRONIC BILATERAL LOW BACK PAIN WITH RIGHT-SIDED SCIATICA: ICD-10-CM

## 2024-08-13 DIAGNOSIS — M54.41 CHRONIC BILATERAL LOW BACK PAIN WITH RIGHT-SIDED SCIATICA: ICD-10-CM

## 2024-08-13 RX ORDER — BACLOFEN 10 MG/1
TABLET ORAL
Qty: 90 TABLET | Refills: 0 | Status: SHIPPED | OUTPATIENT
Start: 2024-08-13

## 2024-08-13 RX ORDER — CYCLOBENZAPRINE HCL 5 MG
TABLET ORAL
Qty: 30 TABLET | Refills: 0 | Status: SHIPPED | OUTPATIENT
Start: 2024-08-13

## 2024-08-13 NOTE — TELEPHONE ENCOUNTER
baclofen (LIORESAL) 10 MG tablet 90 tablet 0 1/4/2024       cyclobenzaprine (FLEXERIL) 5 MG tablet 30 tablet 1 5/6/2024     Last Office Visit: 06/10/2024  Future Office visit:       Routing refill request to provider for review/approval because:

## 2024-08-13 NOTE — TELEPHONE ENCOUNTER
BACLOFEN 10MG TABLET         Routing refill request to provider for review/approval because:  Drug not on the [facility], [facility] or [facility] refill protocol or controlled substance    CYCLOBENZAPRINE HYDROCHLORIDE 5MG TABLET         Routing refill request to provider for review/approval because:  Drug not on the [facility], [facility] or  [facility] refill protocol or controlled substance

## 2024-08-21 ENCOUNTER — THERAPY VISIT (OUTPATIENT)
Dept: PHYSICAL THERAPY | Facility: OTHER | Age: 69
End: 2024-08-21
Attending: FAMILY MEDICINE
Payer: MEDICARE

## 2024-08-21 DIAGNOSIS — G89.29 CHRONIC BILATERAL LOW BACK PAIN WITH RIGHT-SIDED SCIATICA: Primary | ICD-10-CM

## 2024-08-21 DIAGNOSIS — M54.41 CHRONIC BILATERAL LOW BACK PAIN WITH RIGHT-SIDED SCIATICA: Primary | ICD-10-CM

## 2024-08-21 PROCEDURE — 97035 APP MDLTY 1+ULTRASOUND EA 15: CPT | Mod: GP

## 2024-08-21 PROCEDURE — 97140 MANUAL THERAPY 1/> REGIONS: CPT | Mod: GP

## 2024-08-23 ENCOUNTER — THERAPY VISIT (OUTPATIENT)
Dept: PHYSICAL THERAPY | Facility: OTHER | Age: 69
End: 2024-08-23
Attending: FAMILY MEDICINE
Payer: MEDICARE

## 2024-08-23 DIAGNOSIS — M54.31 SCIATICA OF RIGHT SIDE: Primary | ICD-10-CM

## 2024-08-23 PROCEDURE — 97035 APP MDLTY 1+ULTRASOUND EA 15: CPT | Mod: GP

## 2024-08-23 PROCEDURE — 97140 MANUAL THERAPY 1/> REGIONS: CPT | Mod: GP

## 2024-10-03 NOTE — PROGRESS NOTES
DISCHARGE  Reason for Discharge: Patient has met all goals.    Equipment Issued: none    Discharge Plan: Patient to continue home program.    Referring Provider:  Dr Ritter   01/24/23 1100   Appointment Info   Signing clinician's name / credentials Eloise Vanegas,PT   Visits Used 8/10, Medicare note required   Subjective Report   Subjective Report pt reports overall she is doing pretty good. She has noted the R SIJ pain dang as better. SHe is scheduled for injections to the lumbar spine on 2/14/23.   Objective Measures   Objective Measures Objective Measure 1;Objective Measure 2   Objective Measure 1   Objective Measure Palpation   Details mild soft tissue tension R LB   Objective Measure 2   Objective Measure SIJ dysfx   Details negative   Manual Therapy   Manual Therapy: Mobilization, MFR, MLD, friction massage minutes (54859) 24   Skilled Intervention STM and Joint mobilization   Patient Response/Progress good   Treatment Detail . STM R LB/gluteal mms   Progress no SIJ dysfx found.   Plan   Plan for next session d/c to [facility]

## 2024-10-03 NOTE — PROGRESS NOTES
DISCHARGE  Reason for Discharge: Patient has met all goals.    Equipment Issued: none    Discharge Plan: Patient to continue home program.    Referring Provider:  Dr. Ritter   08/23/24 1101   Appointment Info   Signing clinician's name / credentials Eloise Vanegas,TEMITOPE   Visits Used 7-10 Medicare note needed   Medical Diagnosis Chronic bilateral low back pain with right-sided sciatica   PT Tx Diagnosis Chronic bilateral low back pain with right-sided sciatica   Progress Note/Certification   Start of Care Date 07/24/24   Onset of illness/injury or Date of Surgery 07/09/24   Therapy Frequency 2 times/week   Predicted Duration Up to 8 weeks   Certification date from 07/24/24   Certification date to 09/19/24   Progress Note Completed Date 07/24/24   PT Goal 1   Goal Identifier STG 1   Goal Description Decreased pain when at its worst to a 7/10   Target Date 08/08/24   Date Met 08/08/24   PT Goal 2   Goal Identifier LTG 1   Goal Description Patient will demonstrate independence with home exercise program   Target Date 09/19/24   Date Met 08/23/24   PT Goal 3   Goal Identifier LTG 2   Goal Description Patient was able to increase sitting tolerance to 60+ minutes   Target Date 09/19/24   Date Met 08/23/24   Subjective Report   Subjective Report pt reports her back pain as better since beginning PT. SHe will be leaving town for 6 weeks tomorrow.   Ultrasound   Ultrasound Minutes (59630) 8   Ultrasound -Type (does not include 3-5 min prep/cleanup time) Continuous   Intensity 1.5w/cm2   Duration (does not include the 3-5 min set up/clean up time) 8 min   Frequency 1 MHz   Location R LB/glteal mms   Positioning sidelying   Patient Response/Progress tolerated well   Therapeutic Procedure/Exercise   Therapeutic Procedures: strength, endurance, ROM, flexibility minutes (43363) 4   Ther Proc 1 HEP   Ther Proc 1 - Details Access Code: [email or web address]  URL: [email or web address]  Date: 08/23/2024  Prepared by: Eloise Vanegas     Exercises  - Supine Bridge  - 1 x daily - 7 x weekly - 1 sets - 3-5 reps - 5-10 secs hold   Skilled Intervention Verbal and manual cues, patient education   Patient Response/Progress She was able to perform the exercises correctly   Manual Therapy   Manual Therapy: Mobilization, MFR, MLD, friction massage minutes (31382) 26   Manual Therapy 1 Soft tissue mobilization   Manual Therapy 1 - Details STM right and gluteal muscles   Manual Therapy 2 M ET   Manual Therapy 2 - Details re- assessed SIJ w no dysfx found   Skilled Intervention Decrease soft tissue and/or joint/spinal restrictions   Patient Response/Progress Good   Progress no SIJ dysfx and mild remaining soft tissue tension   Plan   Home program See above   Plan for next session d/c to HEP   Total Session Time   Timed Code Treatment Minutes 38   Total Treatment Time (sum of timed and untimed services) 38

## 2024-10-07 DIAGNOSIS — M54.41 CHRONIC BILATERAL LOW BACK PAIN WITH RIGHT-SIDED SCIATICA: ICD-10-CM

## 2024-10-07 DIAGNOSIS — G89.29 CHRONIC BILATERAL LOW BACK PAIN WITH RIGHT-SIDED SCIATICA: ICD-10-CM

## 2024-10-07 DIAGNOSIS — F41.9 ANXIETY: ICD-10-CM

## 2024-10-07 DIAGNOSIS — M54.31 SCIATICA OF RIGHT SIDE: ICD-10-CM

## 2024-10-07 NOTE — TELEPHONE ENCOUNTER
Gabapentin (Neurontin) 100 MG capsule    Last Written Prescription Date:  05/06/2024  Last Fill Quantity: 360,   # refills: 3  Last Office Visit: 06/10/2024      Bupropion (Wellbutrin XL) 150 MG 24 HR tablet    Last Written Prescription Date:  04/05/2024  Last Fill Quantity: 30,   # refills: 5

## 2024-10-08 ENCOUNTER — TELEPHONE (OUTPATIENT)
Dept: FAMILY MEDICINE | Facility: OTHER | Age: 69
End: 2024-10-08

## 2024-10-08 DIAGNOSIS — G89.29 CHRONIC BILATERAL LOW BACK PAIN WITH RIGHT-SIDED SCIATICA: ICD-10-CM

## 2024-10-08 DIAGNOSIS — M54.31 SCIATICA OF RIGHT SIDE: ICD-10-CM

## 2024-10-08 DIAGNOSIS — F41.9 ANXIETY: ICD-10-CM

## 2024-10-08 DIAGNOSIS — M54.41 CHRONIC BILATERAL LOW BACK PAIN WITH RIGHT-SIDED SCIATICA: ICD-10-CM

## 2024-10-08 RX ORDER — BUPROPION HYDROCHLORIDE 150 MG/1
150 TABLET ORAL EVERY MORNING
Qty: 90 TABLET | Refills: 1 | Status: SHIPPED | OUTPATIENT
Start: 2024-10-08

## 2024-10-08 RX ORDER — CYCLOBENZAPRINE HCL 5 MG
5 TABLET ORAL 3 TIMES DAILY PRN
Qty: 30 TABLET | Refills: 2 | Status: SHIPPED | OUTPATIENT
Start: 2024-10-08 | End: 2024-10-10

## 2024-10-08 RX ORDER — GABAPENTIN 100 MG/1
CAPSULE ORAL
Qty: 360 CAPSULE | Refills: 3 | OUTPATIENT
Start: 2024-10-08

## 2024-10-08 RX ORDER — BUPROPION HYDROCHLORIDE 150 MG/1
TABLET ORAL
Qty: 30 TABLET | Refills: 5 | OUTPATIENT
Start: 2024-10-08

## 2024-10-08 RX ORDER — GABAPENTIN 100 MG/1
400 CAPSULE ORAL AT BEDTIME
Qty: 360 CAPSULE | Refills: 1 | Status: SHIPPED | OUTPATIENT
Start: 2024-10-08

## 2024-10-08 NOTE — TELEPHONE ENCOUNTER
Reason for call:  Medication      Have you contacted your pharmacy? Yes  COMPUTER IS DOWN - HOPING TO HAVE THEM BACK UP IN THE PM -   Medication GABAPENTIN 100MG CAPSULE (DOES HAVE REFILLS),  BUPROPION 150 MG 24 HR TABLET, CYCLOBENZAPRINE 5 MG  What Pharmacy do you use? [facility] -       (Please note that the turn-around-time for prescriptions is 72 business hours; I am sending your request at this time. SEND TO appropriate Care Team Pool )

## 2024-10-09 NOTE — TELEPHONE ENCOUNTER
Flexeril      Last Written Prescription Date:  10/8/24  Last Fill Quantity: 30,   # refills: 2  Last Office Visit: 6/10/24  Future Office visit:       Routing refill request to provider for review/approval because:

## 2024-10-10 RX ORDER — CYCLOBENZAPRINE HCL 5 MG
TABLET ORAL
Qty: 30 TABLET | Refills: 5 | Status: SHIPPED | OUTPATIENT
Start: 2024-10-10

## 2024-11-12 DIAGNOSIS — I10 BENIGN ESSENTIAL HYPERTENSION: ICD-10-CM

## 2024-11-12 DIAGNOSIS — E78.5 HYPERLIPIDEMIA, UNSPECIFIED HYPERLIPIDEMIA TYPE: ICD-10-CM

## 2024-11-12 RX ORDER — ATORVASTATIN CALCIUM 10 MG/1
TABLET, FILM COATED ORAL
Qty: 90 TABLET | Refills: 3 | Status: SHIPPED | OUTPATIENT
Start: 2024-11-12

## 2024-11-12 RX ORDER — LISINOPRIL 20 MG/1
TABLET ORAL
Qty: 90 TABLET | Refills: 3 | Status: SHIPPED | OUTPATIENT
Start: 2024-11-12

## 2024-11-12 NOTE — TELEPHONE ENCOUNTER
Atorvastatin 10 mg      Last Written Prescription Date:  11/21/2023  Last Fill Quantity: 90,   # refills: 3  Last Office Visit: 06/12/2024  Future Office visit:       Lisinopril 20 mg      Last Written Prescription Date:  11/21/2023  Last Fill Quantity: 90,   # refills: 3  Last Office Visit: 06/12/2024  Future Office visit:

## 2024-11-26 DIAGNOSIS — Z78.0 MENOPAUSE: ICD-10-CM

## 2024-11-26 RX ORDER — CITALOPRAM HYDROBROMIDE 20 MG/1
TABLET ORAL
Qty: 90 TABLET | Refills: 1 | Status: SHIPPED | OUTPATIENT
Start: 2024-11-26

## 2024-11-26 NOTE — TELEPHONE ENCOUNTER
CITALOPRAM HYDROBROMIDE 20MG TABLET         Last Written Prescription Date:  11/21/23  Last Fill Quantity: 90,   # refills: 3  Last Office Visit: 6/10/24  Future Office visit:       Routing refill request to provider for review/approval because:    SSRIs Protocol [id number]/26/2024 10:53 AM   Protocol Details Medication indicated for associated diagnosis

## 2025-03-12 ENCOUNTER — NURSE TRIAGE (OUTPATIENT)
Dept: FAMILY MEDICINE | Facility: OTHER | Age: 70
End: 2025-03-12

## 2025-03-12 NOTE — TELEPHONE ENCOUNTER
"Nurse Triage SBAR    Is this a 2nd Level Triage? NO    Situation: Low back pain x 2 months - worsening.     Background: Hx of chronic bilateral low back pain. No new injury.     Assessment: low back pain worsening over the last 2 months, reports straight across entire low back. Patient states, \"feels like something is popping in my spine when bending over\".     Denies radiculopathy, weakness, numbness or changes in bowel/bladder. Denies fever.     Protocol Recommended Disposition:   See in Office Within 3 Days    Recommendation: see in Office within 3 days per protocol.  Ice alternating with heat, rest, continue with prescribed medications baclofen, cyclobenzaprine and gabapentin. Follow up scheduled with Dr. Marx on 3/14/25. Return call or go to ED/UC for worsening symptoms.     Next 5 appointments (look out 90 days)      Mar 14, 2025 1:15 PM  (Arrive by 1:00 PM)  Provider Visit with Carolyn Ritter MD  [facility] ([facility] [address], [place] [address]  [phone]            Routed FYI to Dr. Marx     Does the patient meet one of the following criteria for ADS visit consideration? No       Reason for Disposition   MODERATE back pain (e.g., interferes with normal activities) and present > 3 days    Additional Information   Negative: Passed out (e.g., fainted, lost consciousness, blacked out and was not responding)   Negative: Shock suspected (e.g., cold/pale/clammy skin, too weak to stand, low BP, rapid pulse)   Negative: Sounds like a life-threatening emergency to the triager   Negative: Major injury to the back (e.g., MVA, fall > 10 feet or 3 meters, penetrating injury, etc.)   Negative: Pain in the upper back over the ribs (rib cage) that radiates (travels) into the chest   Negative: Pain in the upper back over the ribs (rib cage) and worsened by coughing (or clearly increases with breathing)   Negative: Back pain during " "pregnancy   Negative: SEVERE back pain of sudden onset and age > 60 years   Negative: SEVERE abdominal pain (e.g., excruciating)   Negative: Abdominal pain and age > 60 years   Negative: Unable to urinate (or only a few drops) and bladder feels very full   Negative: Loss of bladder or bowel control (urine or bowel incontinence; wetting self, leaking stool) of new-onset   Negative: Numbness (loss of sensation) in groin or rectal area   Negative: Pain radiates into groin, scrotum   Negative: Blood in urine (red, pink, or tea-colored)   Negative: Vomiting and pain over lower ribs of back (i.e., flank - kidney area)   Negative: Weakness of a leg or foot (e.g., unable to bear weight, dragging foot)   Negative: Patient sounds very sick or weak to the triager   Negative: Fever > 100.4 F (38.0 C) and flank pain   Negative: Pain or burning with passing urine (urination)   Negative: SEVERE back pain (e.g., excruciating, unable to do any normal activities) and not improved after pain medicine and CARE ADVICE   Negative: Numbness in an arm or hand (i.e., loss of sensation) and upper back pain   Negative: Numbness in a leg or foot (i.e., loss of sensation)   Negative: High-risk adult (e.g., history of cancer, history of HIV, or history of IV Drug Use)   Negative: Soft tissue infection (e.g., abscess, cellulitis) or other serious infection (e.g., bacteremia) in last 2 weeks   Negative: Painful rash with multiple small blisters grouped together (i.e., dermatomal distribution or 'band' or 'stripe')   Negative: Pain radiates into the thigh or further down the leg, and in both legs   Negative: Age > 50 and no history of prior similar back pain    Answer Assessment - Initial Assessment Questions  1. ONSET: \"When did the pain begin?\" (e.g., minutes, hours, days)      X a couple months       Pain is getting worse.     2. LOCATION: \"Where does it hurt?\" (upper, mid or lower back)      Low back - straight across low back       \"Feels like " "something is popping in my spine when bending over\"    3. SEVERITY: \"How bad is the pain?\"  (e.g., Scale 1-10; mild, moderate, or severe)      8 /10 - when bending over and with certain movements.     4. PATTERN: \"Is the pain constant?\" (e.g., yes, no; constant, intermittent)       Constant at a 1/10- hx of SI Joint       Pain increases when bending over    5. RADIATION: \"Does the pain shoot into your legs or somewhere else?\"      No - denies radiculapathy    6. CAUSE:  \"What do you think is causing the back pain?\"       Unknown- hx of chronic bilateral low back pain with RT sided sciatica     7. BACK OVERUSE:  \"Any recent lifting of heavy objects, strenuous work or exercise?\"      No     8. MEDICINES: \"What have you taken so far for the pain?\" (e.g., nothing, acetaminophen, NSAIDS)      Baclofen up to three times per day      Gabapentin 400 mg at bedtime      Cyclobenzaprine prn for muscle spasms- taking more often than usual due to spasms.     9. NEUROLOGIC SYMPTOMS: \"Do you have any weakness, numbness, or problems with bowel/bladder control?\"      Denies neurologic symptoms.     10. OTHER SYMPTOMS: \"Do you have any other symptoms?\" (e.g., fever, abdomen pain, burning with urination, blood in urine)        No     11. PREGNANCY: \"Is there any chance you are pregnant?\" \"When was your last menstrual period?\"        No    Protocols used: Back Pain-A-[place]    "

## 2025-03-12 NOTE — TELEPHONE ENCOUNTER
"Symptom or reason needing to speak to RN: SEVERAL QUESTIONS - SHARP PAIN ACROSS LOWER BACK AND A \"POP\" IN SPINE WHEN STANDING OR MOVING - PAIN AT AN 8    Best number to return call: [phone]     Best time to return call: ANY   "

## 2025-03-14 ENCOUNTER — ANCILLARY PROCEDURE (OUTPATIENT)
Dept: GENERAL RADIOLOGY | Facility: OTHER | Age: 70
End: 2025-03-14
Attending: FAMILY MEDICINE
Payer: MEDICARE

## 2025-03-14 ENCOUNTER — OFFICE VISIT (OUTPATIENT)
Dept: FAMILY MEDICINE | Facility: OTHER | Age: 70
End: 2025-03-14
Attending: FAMILY MEDICINE
Payer: MEDICARE

## 2025-03-14 VITALS
OXYGEN SATURATION: 98 % | HEART RATE: 81 BPM | DIASTOLIC BLOOD PRESSURE: 72 MMHG | SYSTOLIC BLOOD PRESSURE: 115 MMHG | WEIGHT: 141 LBS | HEIGHT: 62 IN | RESPIRATION RATE: 16 BRPM | BODY MASS INDEX: 25.95 KG/M2 | TEMPERATURE: 98.6 F

## 2025-03-14 DIAGNOSIS — M79.674 PAIN OF TOE OF RIGHT FOOT: ICD-10-CM

## 2025-03-14 DIAGNOSIS — G89.29 CHRONIC BILATERAL LOW BACK PAIN WITH RIGHT-SIDED SCIATICA: Primary | ICD-10-CM

## 2025-03-14 DIAGNOSIS — M54.31 SCIATICA OF RIGHT SIDE: ICD-10-CM

## 2025-03-14 DIAGNOSIS — M54.41 CHRONIC BILATERAL LOW BACK PAIN WITH RIGHT-SIDED SCIATICA: Primary | ICD-10-CM

## 2025-03-14 PROCEDURE — 73660 X-RAY EXAM OF TOE(S): CPT | Mod: TC,RT

## 2025-03-14 PROCEDURE — G0463 HOSPITAL OUTPT CLINIC VISIT: HCPCS

## 2025-03-14 RX ORDER — PREDNISONE 20 MG/1
20 TABLET ORAL DAILY
Qty: 5 TABLET | Refills: 0 | Status: SHIPPED | OUTPATIENT
Start: 2025-03-14

## 2025-03-14 RX ORDER — HYDROCODONE BITARTRATE AND ACETAMINOPHEN 7.5; 325 MG/1; MG/1
1 TABLET ORAL EVERY 6 HOURS PRN
Qty: 30 TABLET | Refills: 0 | Status: SHIPPED | OUTPATIENT
Start: 2025-03-14

## 2025-03-14 ASSESSMENT — PAIN SCALES - GENERAL: PAINLEVEL_OUTOF10: MILD PAIN (2)

## 2025-03-14 NOTE — PROGRESS NOTES
"  Assessment & Plan     1. Chronic bilateral low back pain with right-sided sciatica (Primary)  Prednisone burst given, hydrocodone refilled for intermittent use.  New PT referral also placed.  Follow-up if no improvement noted.  - predniSONE (DELTASONE) 20 MG tablet; Take 1 tablet (20 mg) by mouth daily.  Dispense: 5 tablet; Refill: 0  - Physical Therapy  Referral; Future  - HYDROcodone-acetaminophen (NORCO) 7.5-325 MG per tablet; Take 1 tablet by mouth every 6 hours as needed for moderate to severe pain.  Dispense: 30 tablet; Refill: 0    2. Sciatica of right side  - predniSONE (DELTASONE) 20 MG tablet; Take 1 tablet (20 mg) by mouth daily.  Dispense: 5 tablet; Refill: 0  - Physical Therapy  Referral; Future  - HYDROcodone-acetaminophen (NORCO) 7.5-325 MG per tablet; Take 1 tablet by mouth every 6 hours as needed for moderate to severe pain.  Dispense: 30 tablet; Refill: 0    3. Pain of toe of right foot  No acute or subacute fractures noted, recommended stiff soled shoe to minimize flexion of the toe.  Consider Podiatry referral if no improvement noted.  - XR Toe Right G/E 2 Views; Future           BMI  Estimated body mass index is 25.78 kg/m  as calculated from the following:    Height as of this encounter: 1.575 m (5' 2.01\").    Weight as of this encounter: 64 kg (141 lb).       The longitudinal plan of care for the diagnosis(es)/condition(s) as documented were addressed during this visit. Due to the added complexity in care, I will continue to support Lisa in the subsequent management and with ongoing continuity of care.     Return if symptoms worsen or fail to improve.      Subjective   Lisa is a 70 year old, presenting for the following health issues:  Back Pain    HPI      Pain History:  When did you first notice your pain? 9/14/09 DOI   Have you seen this provider for your pain in the past? Yes   Where in your body do you have pain? Low back  Are you seeing anyone else for your " "pain? Yes - years ago          3/26/2018     9:30 AM 9/20/2018    11:54 AM 1/9/2024     2:53 PM   PHQ-9 SCORE   PHQ-9 Total Score MyChart   4 (Minimal depression)   PHQ-9 Total Score 4 8 4           3/26/2018     9:30 AM 9/20/2018    11:54 AM 1/9/2024     2:54 PM   DAY[place]-7 SCORE   Total Score   3 (minimal anxiety)   Total Score 1 4 3            No data to display                Chronic Pain Follow Up:    Location of pain: low back  Analgesia/pain control:    - Recent changes:  feeling of popping and excruciating pain    - Overall control: Inadequate pain control    - Current treatments: hydrocodone, ibuprofen    Adherence:     - Do you ever take more pain medicine than prescribed? No    - When did you take your last dose of pain medicine?  8:45 am on Thursday   Adverse effects: No   PDMP Review         Value Time User    State PDMP site checked  Yes 8/12/2024 11:35 AM Carolyn Ritter MD          Last CSA Agreement:   CSA -- Patient Level:    CSA: None found at the patient level.       Last UDS:       Patient also stubbed her right middle toe a few months ago, she notes it is still painful.        Review of Systems  Constitutional, HEENT, cardiovascular, pulmonary, gi and gu systems are negative, except as otherwise noted.      Objective    /72 (BP Location: Left arm, Patient Position: Sitting, Cuff Size: Adult Regular)   Pulse 81   Temp 98.6  F (37  C) (Tympanic)   Resp 16   Ht 1.575 m (5' 2.01\")   Wt 64 kg (141 lb)   SpO2 98%   BMI 25.78 kg/m    Body mass index is 25.78 kg/m .  Physical Exam   GENERAL: alert and no distress  PSYCH: mentation appears normal, affect normal/bright    Xray - Reviewed and interpreted by me.  No acute or healing fractures        Signed Electronically by: Carolyn Ritter MD    "

## 2025-03-18 ENCOUNTER — THERAPY VISIT (OUTPATIENT)
Dept: PHYSICAL THERAPY | Facility: OTHER | Age: 70
End: 2025-03-18
Attending: FAMILY MEDICINE
Payer: MEDICARE

## 2025-03-18 DIAGNOSIS — M54.41 CHRONIC BILATERAL LOW BACK PAIN WITH RIGHT-SIDED SCIATICA: Primary | ICD-10-CM

## 2025-03-18 DIAGNOSIS — G89.29 CHRONIC BILATERAL LOW BACK PAIN WITH RIGHT-SIDED SCIATICA: Primary | ICD-10-CM

## 2025-03-18 PROCEDURE — 97110 THERAPEUTIC EXERCISES: CPT | Mod: GP

## 2025-03-18 PROCEDURE — 97162 PT EVAL MOD COMPLEX 30 MIN: CPT | Mod: GP

## 2025-03-18 NOTE — PROGRESS NOTES
PHYSICAL THERAPY EVALUATION  Type of Visit: Evaluation       Fall Risk Screen:  Fall screen completed by: PT  Have you fallen 2 or more times in the past year?: No  Have you fallen and had an injury in the past year?: No  Is patient a fall risk?: No    Subjective         Presenting condition or subjective complaint: si joint  spasems both sides  Date of onset: 02/14/09    Relevant medical history: Cancer; High blood pressure; Menopause; Osteoporosis; Overweight; Significant weakness   Dates & types of surgery: L upper leg  2wrist   2 c  sections  R upper leg  sinus    Prior diagnostic imaging/testing results: MRI; X-ray     Prior therapy history for the same diagnosis, illness or injury: Yes pt    Prior Level of Function  Transfers: Independent  Ambulation: Independent  ADL: Independent  IADL:     Living Environment  Social support: With a significant other or spouse   Type of home: House   Stairs to enter the home: Yes 3 Is there a railing: Yes     Ramp: No   Stairs inside the home: No       Help at home: Home and Yard maintenance tasks  Equipment owned:       Employment: No    Hobbies/Interests: fishing in summer  just getting fresh air    Patient goals for therapy: alot of things    Pain assessment:  Patient reports longstanding history of back pain.  She was injured at work when working at [email or web address] on 9/14/2009.  Patient has had previous spinal injections which she has noted some relief from.  She has seen a few different back specialist in the past however no one recently.  She has had significant physical therapy in the past with her last session being from 7/24 to 8/24.  She reports she has continued with most of the exercises.  She notes her back pain is worsening and she is now also having pain across the bilateral lower back.  When she was last seen her pain is primarily on the right side but no notes bilateral low back symptoms and symptoms into the bilateral lower extremities.  She denies any  recent injuries or changes in activities.  She does continue to do heat and ice alternating the 2.  She was recently seen by her primary care provider and was put on a course of prednisone which she will be finishing tomorrow.  She states it did seem to help some.  She is also now referred to physical therapy     Objective   LUMBAR SPINE EVALUATION  PAIN: Pain Level at Rest: 1/10  Pain Level with Use: 8/10  Pain Location: Bilateral low back/lumbar pain is noted.  She also has pain into the right gluteal/buttock region.  She notes intermittent posterior thigh pain tingling and numbness.  Pain Quality: Aching, Dull, Numb, Sharp, Shooting, and Tingling  Pain Frequency: constant  Pain is Exacerbated By: Bending, sleeping-she is now waking 4-5 times a week, lifting, walking, sitting, standing  Pain is Relieved By: cold, heat, and stretch  Pain Progression: Worsened  INTEGUMENTARY (edema, incisions):   POSTURE:  Mild forward head rounded shoulder posture, increased CT kyphosis, increased lumbar lordosis.  The left shoulder and iliac crest were elevated as compared to the right  GAIT:   Weightbearing Status: WBAT  Assistive Device(s): None  Gait Deviations: WFL  BALANCE/PROPRIOCEPTION:   WEIGHTBEARING ALIGNMENT:   NON-WEIGHTBEARING ALIGNMENT:    ROM:  Trunk active range of motion: Flexion = 0 to 65 degrees with general low back tightness noted, back bending = 0 to 23 degrees with pain on right low back, right sidebending = 0 to 18 degrees with patient noting discomfort on right low back, left sidebending = 0 to 26 degrees with contralateral stretch  PELVIC/SI SCREEN:  Positive sitting forward flexion, positive standing forward flexion test, and positive stork test on right  STRENGTH:  She is able to withstand moderate resistance with her trunk flexors and trunk extensors    MYOTOMES:  She is able to heel and toe walk well  DTR S:   CORD SIGNS: WNL  DERMATOMES: WNL  NEURAL TENSION: Lumbar WNL  FLEXIBILITY:  Hypomobility  noted in the quadriceps, gastroc, and piriformis muscles and slightly in the bilateral hamstrings  LUMBAR/HIP Special Tests: WNL   PELVIS/SI SPECIAL TESTS:  Test positive for left on left anterior sacral torsion dysfunction  FUNCTIONAL TESTS:   PALPATION:  Soft tissue tension and tenderness to palpation especially along the right gluteal/piriformis muscles, the left greater than right lumbar paraspinal muscles and quadratus lumborum muscle.  Point tenderness over the right PSIS/SI joint  SPINAL SEGMENTAL CONCLUSIONS:  ERS left L5, FRS left L4      Assessment & Plan   CLINICAL IMPRESSIONS  Medical Diagnosis: Chronic bilateral low back pain with right-sided sciatica    Treatment Diagnosis: Chronic bilateral low back pain with right-sided sciatica   Impression/Assessment: Patient is a 70 year old female with bilateral low back and posterior lower extremity pain complaints.  The following significant findings have been identified: Pain, Decreased ROM/flexibility, Decreased joint mobility, Decreased strength, Impaired muscle performance, and Decreased activity tolerance. These impairments interfere with their ability to perform self care tasks, recreational activities, household chores, household mobility, and community mobility as compared to previous level of function.     Clinical Decision Making (Complexity):  Clinical Presentation: Evolving/Changing  Clinical Presentation Rationale: based on medical and personal factors listed in PT evaluation  Clinical Decision Making (Complexity): Moderate complexity    PLAN OF CARE  Treatment Interventions:  Modalities: Cryotherapy, E-stim, Ultrasound  Interventions: Manual Therapy, Neuromuscular Re-education, Therapeutic Activity, Therapeutic Exercise, Self-Care/Home Management    Long Term Goals     PT Goal 1  Goal Identifier: STG 1  Goal Description: Decrease pain when at its worst to a 6/10  Target Date: 04/01/25  PT Goal 2  Goal Identifier: LTG 1  Goal Description: Patient  will demonstrate independence with home exercise program  Target Date: 05/13/25  PT Goal 3  Goal Identifier: LTG 2  Goal Description: Patient will be able to sleep throughout the night without waking due to back pain 4 out of 7 nights per week  Target Date: 05/13/25      Frequency of Treatment: 2 times/week  Duration of Treatment: Up to 8 weeks    Recommended Referrals to Other Professionals:   Education Assessment:   Learner/Method: Patient;Listening;Pictures/Video;No Barriers to Learning  Education Comments: Patient education/discussion in today's objective findings utilizing the anatomical model along with recommended treatment plan    Risks and benefits of evaluation/treatment have been explained.   Patient/Family/caregiver agrees with Plan of Care.     Evaluation Time:     PT Eval, Moderate Complexity Minutes (16582): 35       Signing Clinician: TEMITOPE Mcnamara [facility]                                                                                   OUTPATIENT PHYSICAL THERAPY      PLAN OF TREATMENT FOR OUTPATIENT REHABILITATION   Patient's Last Name, First Name, MANOJLisa Vyas YOB: 1955   Provider's Name   [facility]   Medical Record No.  [id number]     Onset Date: 02/14/09  Start of Care Date: 03/18/25     Medical Diagnosis:  Chronic bilateral low back pain with right-sided sciatica      PT Treatment Diagnosis:  Chronic bilateral low back pain with right-sided sciatica Plan of Treatment  Frequency/Duration: 2 times/week/ Up to 8 weeks    Certification date from 03/18/25 to 05/13/25         See note for plan of treatment details and functional goals     Eloise Vanegas PT                         I CERTIFY THE NEED FOR THESE SERVICES FURNISHED UNDER        THIS PLAN OF TREATMENT AND WHILE UNDER MY CARE     (Physician attestation of this document indicates review and certification of the therapy plan).               Referring Provider:  Dr. Carolyn Marx    Initial Assessment  See Epic Evaluation- Start of Care Date: 03/18/25

## 2025-03-26 ENCOUNTER — THERAPY VISIT (OUTPATIENT)
Dept: PHYSICAL THERAPY | Facility: OTHER | Age: 70
End: 2025-03-26
Attending: FAMILY MEDICINE
Payer: MEDICARE

## 2025-03-26 DIAGNOSIS — M54.41 CHRONIC BILATERAL LOW BACK PAIN WITH RIGHT-SIDED SCIATICA: Primary | ICD-10-CM

## 2025-03-26 DIAGNOSIS — G89.29 CHRONIC BILATERAL LOW BACK PAIN WITH RIGHT-SIDED SCIATICA: Primary | ICD-10-CM

## 2025-03-26 PROCEDURE — 97140 MANUAL THERAPY 1/> REGIONS: CPT | Mod: GP

## 2025-03-26 PROCEDURE — 97035 APP MDLTY 1+ULTRASOUND EA 15: CPT | Mod: GP

## 2025-03-31 DIAGNOSIS — F41.9 ANXIETY: ICD-10-CM

## 2025-03-31 DIAGNOSIS — G89.29 CHRONIC BILATERAL LOW BACK PAIN WITH RIGHT-SIDED SCIATICA: ICD-10-CM

## 2025-03-31 DIAGNOSIS — M54.41 CHRONIC BILATERAL LOW BACK PAIN WITH RIGHT-SIDED SCIATICA: ICD-10-CM

## 2025-04-01 ENCOUNTER — THERAPY VISIT (OUTPATIENT)
Dept: PHYSICAL THERAPY | Facility: OTHER | Age: 70
End: 2025-04-01
Attending: FAMILY MEDICINE
Payer: MEDICARE

## 2025-04-01 DIAGNOSIS — G89.29 CHRONIC BILATERAL LOW BACK PAIN WITH RIGHT-SIDED SCIATICA: Primary | ICD-10-CM

## 2025-04-01 DIAGNOSIS — M54.41 CHRONIC BILATERAL LOW BACK PAIN WITH RIGHT-SIDED SCIATICA: Primary | ICD-10-CM

## 2025-04-01 PROCEDURE — 97140 MANUAL THERAPY 1/> REGIONS: CPT | Mod: GP

## 2025-04-01 PROCEDURE — 97035 APP MDLTY 1+ULTRASOUND EA 15: CPT | Mod: GP

## 2025-04-01 RX ORDER — BUPROPION HYDROCHLORIDE 150 MG/1
150 TABLET ORAL EVERY MORNING
Qty: 90 TABLET | Refills: 1 | Status: SHIPPED | OUTPATIENT
Start: 2025-04-01

## 2025-04-01 RX ORDER — BACLOFEN 10 MG/1
TABLET ORAL
Qty: 90 TABLET | Refills: 1 | Status: SHIPPED | OUTPATIENT
Start: 2025-04-01

## 2025-04-01 NOTE — TELEPHONE ENCOUNTER
BUPROPION HYDROCHLORIDE ER (XL) 150MG XL TABLET ER 24HR         Last Written Prescription Date:  10/8/24  Last Fill Quantity: 90,   # refills: 1  Last Office Visit: 3/14/25  Future Office visit:       Routing refill request to provider for review/approval because:    Rx Protocol Bupropion Piatap0403/31/2025 04:08 PM   Protocol Details [facility]-7 on file in the past 12 months     BACLOFEN 10MG TABLET         Last Written Prescription Date:  8/13/24  Last Fill Quantity: 90,   # refills: 0  Last Office Visit: 3/14/25  Future Office visit:       Routing refill request to provider for review/approval because:  Drug not on the G, P or [facility] refill protocol or controlled substance

## 2025-04-08 ENCOUNTER — THERAPY VISIT (OUTPATIENT)
Dept: PHYSICAL THERAPY | Facility: OTHER | Age: 70
End: 2025-04-08
Attending: FAMILY MEDICINE
Payer: MEDICARE

## 2025-04-08 DIAGNOSIS — G89.29 CHRONIC BILATERAL LOW BACK PAIN WITH RIGHT-SIDED SCIATICA: Primary | ICD-10-CM

## 2025-04-08 DIAGNOSIS — M54.41 CHRONIC BILATERAL LOW BACK PAIN WITH RIGHT-SIDED SCIATICA: Primary | ICD-10-CM

## 2025-04-08 PROCEDURE — 97035 APP MDLTY 1+ULTRASOUND EA 15: CPT | Mod: GP

## 2025-04-08 PROCEDURE — 97140 MANUAL THERAPY 1/> REGIONS: CPT | Mod: GP

## 2025-04-10 ENCOUNTER — THERAPY VISIT (OUTPATIENT)
Dept: PHYSICAL THERAPY | Facility: OTHER | Age: 70
End: 2025-04-10
Attending: FAMILY MEDICINE
Payer: MEDICARE

## 2025-04-10 DIAGNOSIS — G89.29 CHRONIC BILATERAL LOW BACK PAIN WITH RIGHT-SIDED SCIATICA: Primary | ICD-10-CM

## 2025-04-10 DIAGNOSIS — M54.41 CHRONIC BILATERAL LOW BACK PAIN WITH RIGHT-SIDED SCIATICA: Primary | ICD-10-CM

## 2025-04-10 PROCEDURE — 97140 MANUAL THERAPY 1/> REGIONS: CPT | Mod: GP

## 2025-04-10 PROCEDURE — 97035 APP MDLTY 1+ULTRASOUND EA 15: CPT | Mod: GP

## 2025-04-17 ENCOUNTER — THERAPY VISIT (OUTPATIENT)
Dept: PHYSICAL THERAPY | Facility: OTHER | Age: 70
End: 2025-04-17
Attending: FAMILY MEDICINE
Payer: MEDICARE

## 2025-04-17 DIAGNOSIS — M54.41 CHRONIC BILATERAL LOW BACK PAIN WITH RIGHT-SIDED SCIATICA: Primary | ICD-10-CM

## 2025-04-17 DIAGNOSIS — G89.29 CHRONIC BILATERAL LOW BACK PAIN WITH RIGHT-SIDED SCIATICA: Primary | ICD-10-CM

## 2025-04-17 PROCEDURE — 97035 APP MDLTY 1+ULTRASOUND EA 15: CPT | Mod: GP

## 2025-04-17 PROCEDURE — 97140 MANUAL THERAPY 1/> REGIONS: CPT | Mod: GP

## 2025-05-14 ENCOUNTER — HOSPITAL ENCOUNTER (EMERGENCY)
Facility: HOSPITAL | Age: 70
Discharge: HOME | End: 2025-05-14
Payer: MEDICARE

## 2025-05-14 VITALS
SYSTOLIC BLOOD PRESSURE: 116 MMHG | DIASTOLIC BLOOD PRESSURE: 70 MMHG | TEMPERATURE: 99.3 F | HEART RATE: 81 BPM | OXYGEN SATURATION: 100 % | RESPIRATION RATE: 18 BRPM

## 2025-05-14 DIAGNOSIS — J06.9 VIRAL URI WITH COUGH: ICD-10-CM

## 2025-05-14 DIAGNOSIS — H65.91 OME (OTITIS MEDIA WITH EFFUSION), RIGHT: ICD-10-CM

## 2025-05-14 PROCEDURE — 99213 OFFICE O/P EST LOW 20 MIN: CPT

## 2025-05-14 PROCEDURE — G0463 HOSPITAL OUTPT CLINIC VISIT: HCPCS

## 2025-05-14 ASSESSMENT — ENCOUNTER SYMPTOMS
DIAPHORESIS: 0
CHEST TIGHTNESS: 0
SINUS PAIN: 0
BRUISES/BLEEDS EASILY: 0
GASTROINTESTINAL NEGATIVE: 1
NEUROLOGICAL NEGATIVE: 1
UNEXPECTED WEIGHT CHANGE: 0
VOICE CHANGE: 1
ACTIVITY CHANGE: 0
DIARRHEA: 0
STRIDOR: 0
NAUSEA: 0
ABDOMINAL PAIN: 0
SINUS PRESSURE: 0
RHINORRHEA: 1
TROUBLE SWALLOWING: 0
CHOKING: 0
CONSTIPATION: 0
APPETITE CHANGE: 0
CONSTITUTIONAL NEGATIVE: 1
COUGH: 1
FEVER: 0
APNEA: 0
EYE REDNESS: 0
CHILLS: 0
PHOTOPHOBIA: 0
EYE DISCHARGE: 1
MYALGIAS: 1
EYE ITCHING: 0
FATIGUE: 0
CARDIOVASCULAR NEGATIVE: 1
SHORTNESS OF BREATH: 0
SORE THROAT: 1
VOMITING: 0
ABDOMINAL DISTENTION: 0
WHEEZING: 0
ADENOPATHY: 1
EYE PAIN: 0
PSYCHIATRIC NEGATIVE: 1

## 2025-05-14 ASSESSMENT — COLUMBIA-SUICIDE SEVERITY RATING SCALE - C-SSRS
1. IN THE PAST MONTH, HAVE YOU WISHED YOU WERE DEAD OR WISHED YOU COULD GO TO SLEEP AND NOT WAKE UP?: NO
6. HAVE YOU EVER DONE ANYTHING, STARTED TO DO ANYTHING, OR PREPARED TO DO ANYTHING TO END YOUR LIFE?: NO
2. HAVE YOU ACTUALLY HAD ANY THOUGHTS OF KILLING YOURSELF IN THE PAST MONTH?: NO

## 2025-05-14 ASSESSMENT — ACTIVITIES OF DAILY LIVING (ADL): ADLS_ACUITY_SCORE: 41

## 2025-05-14 NOTE — DISCHARGE INSTRUCTIONS
Symptomatic treatments recommended.  -Discussed that antibiotics would not help symptoms of viral URI. Education provided on symptoms of secondary bacterial infection such as new fever, chills, rigors, shortness of breath, increased work of breathing, that can occur with viral URI and need for further evaluation, if they occur.   - Ensure you are staying hydrated by drinking plenty of fluids or eating foods such as popsicles, jello, pudding.  - Honey can be soothing for sore throat  - Warm salt water gurgles can help soothe sore throat  - Rest  - Humidifier can help with congestion and help keep mucus membranes such as throat and nose from drying out.  - Sleeping slightly propped up can help with congestion and postnasal drainage that can worsen cough at bedtime.  - As long as you have never been told to take Tylenol and/or Ibuprofen you can use them to manage fever and body aches per package instructions  Make sure you eat when you take ibuprofen to avoid stomach upset.  - OTC cough medications per package instructions to help with cough. Check to see if the cough/cold medication already has acetaminophen (Tylenol) in it. If it does avoid taking additional Tylenol.  - If sudden onset of new fever, worsening symptoms return for further evaluation.  - OTC antihistamine such as Allegra, Zyrtec, Claritin (generic is okay) can help with nasal/sinus congestion and OTC nasal steroid such as Flonase can help decrease sinus inflammation to help with congestion.  - Education provided on symptoms of post-viral bacterial infections including ear infection and pneumonia. This would require re-evaluation for treatment.
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
## 2025-05-19 DIAGNOSIS — Z78.0 MENOPAUSE: ICD-10-CM

## 2025-05-20 ENCOUNTER — TELEPHONE (OUTPATIENT)
Dept: FAMILY MEDICINE | Facility: OTHER | Age: 70
End: 2025-05-20

## 2025-05-20 RX ORDER — CITALOPRAM HYDROBROMIDE 20 MG/1
TABLET ORAL
Qty: 90 TABLET | Refills: 2 | Status: SHIPPED | OUTPATIENT
Start: 2025-05-20

## 2025-05-20 NOTE — TELEPHONE ENCOUNTER
CITALOPRAM HYDROBROMIDE 20MG TABLET         Last Written Prescription Date:  11/26/24  Last Fill Quantity: 90,   # refills: 1  Last Office Visit: 3/14/25  Future Office visit:       Routing refill request to provider for review/approval because:      SSRIs Protocol [id number]/19/2025 09:33 AM   Protocol Details Medication indicated for associated diagnosis

## 2025-05-21 NOTE — TELEPHONE ENCOUNTER
I apologize for my delay in response.  We should probably see her here first, then we can determine what work-up and referrals are needed.  In the meantime, a really common thing that can cause both urinary and stool incontinence is actually constipation - the pressure from stool back-up on the bladder prevents it from emptying fully so there can be leakage, and if the colon is stretched because of excess stool, it can be hard to control muscles and loose stool can move around the hard stool and leak.

## 2025-05-21 NOTE — TELEPHONE ENCOUNTER
This writer returned call to the patient. Update provided per Dr. Marx's Note below from 923 am. Patient verbalized understanding.     Appointment scheduled with Dr. Marx as per below:    Next 5 appointments (look out 90 days)      May 23, 2025 4:30 PM  (Arrive by 4:15 PM)  Provider Visit with Carolyn Ritter MD  [facility] ([facility] ) [address]  [facility] [address]  [phone]          Patient will arrive at 130 pm for 145 pm appointment with Dr. Marx.    PAST MEDICAL HISTORY:  Atrial fibrillation     High cholesterol     Hypertension, unspecified type     Hypothyroid     Pacemaker     Stroke 2019 -RT side weaknes

## 2025-05-22 NOTE — PROGRESS NOTES
"  Assessment & Plan     1. Constipation, unspecified constipation type (Primary)  I suspect her main underlying issue is constipation, causing other symptoms as well.  Will start with Miralax clean-out.  Follow-up if no improvement noted.  - polyethylene glycol (MIRALAX) 17 GM/Dose powder; Take 17 g (1 Capful) by mouth 2 times daily as needed for constipation.  Dispense: 510 g; Refill: 0    2. Urinary, incontinence, stress female  Likely related to constipation.  Will start with stool clean-out as above, consider further work-up if no improvement noted.       The longitudinal plan of care for the diagnosis(es)/condition(s) as documented were addressed during this visit. Due to the added complexity in care, I will continue to support Lisa in the subsequent management and with ongoing continuity of care.     BMI  Estimated body mass index is 25.42 kg/m  as calculated from the following:    Height as of this encounter: 1.575 m (5' 2\").    Weight as of this encounter: 63 kg (139 lb).       Follow-up  Return if symptoms worsen or fail to improve.    Subjective   Lisa is a 70 year old, presenting for the following health issues:  Bladder Problems and Bowel Problems    HPI      Concern - Bowel   Onset: 2 months  Description: bowel/rectum feels like a slow leak  Intensity: moderate  Progression of Symptoms:  same  Accompanying Signs & Symptoms: none  Previous history of similar problem: none  Precipitating factors:        Worsened by: none  Alleviating factors:        Improved by: none  Therapies tried and outcome: None  Patient states she is not having normal BMs overall.  She is mostly constipated without daily BMs.    Concern - Bladder  Onset: 3 weeks  Description: leaking of urine   Intensity: moderate  Progression of Symptoms:  same  Accompanying Signs & Symptoms: now with coughing and sneezing makes it happen  Previous history of similar problem: none  Precipitating factors:        Worsened by: none  Alleviating " "factors:        Improved by: none  Therapies tried and outcome: None      Review of Systems  Constitutional, HEENT, cardiovascular, pulmonary, gi and gu systems are negative, except as otherwise noted.      Objective    /66 (BP Location: Right arm, Patient Position: Sitting, Cuff Size: Adult Regular)   Pulse 78   Temp 98.7  F (37.1  C) (Tympanic)   Resp 18   Ht 1.575 m (5' 2\")   Wt 63 kg (139 lb)   SpO2 95%   Breastfeeding No   BMI 25.42 kg/m    Body mass index is 25.42 kg/m .  Physical Exam   GENERAL: alert and no distress  PSYCH: mentation appears normal, affect normal/bright          Signed Electronically by: Carolyn Ritter MD    "

## 2025-05-23 ENCOUNTER — OFFICE VISIT (OUTPATIENT)
Dept: FAMILY MEDICINE | Facility: OTHER | Age: 70
End: 2025-05-23
Attending: FAMILY MEDICINE
Payer: COMMERCIAL

## 2025-05-23 VITALS
BODY MASS INDEX: 25.58 KG/M2 | HEART RATE: 78 BPM | TEMPERATURE: 98.7 F | RESPIRATION RATE: 18 BRPM | DIASTOLIC BLOOD PRESSURE: 66 MMHG | SYSTOLIC BLOOD PRESSURE: 114 MMHG | HEIGHT: 62 IN | WEIGHT: 139 LBS | OXYGEN SATURATION: 95 %

## 2025-05-23 DIAGNOSIS — K59.00 CONSTIPATION, UNSPECIFIED CONSTIPATION TYPE: Primary | ICD-10-CM

## 2025-05-23 DIAGNOSIS — N39.3 URINARY, INCONTINENCE, STRESS FEMALE: ICD-10-CM

## 2025-05-23 PROCEDURE — 3074F SYST BP LT 130 MM HG: CPT | Performed by: FAMILY MEDICINE

## 2025-05-23 PROCEDURE — G0463 HOSPITAL OUTPT CLINIC VISIT: HCPCS

## 2025-05-23 PROCEDURE — 3078F DIAST BP <80 MM HG: CPT | Performed by: FAMILY MEDICINE

## 2025-05-23 PROCEDURE — G2211 COMPLEX E/M VISIT ADD ON: HCPCS | Performed by: FAMILY MEDICINE

## 2025-05-23 PROCEDURE — 1125F AMNT PAIN NOTED PAIN PRSNT: CPT | Performed by: FAMILY MEDICINE

## 2025-05-23 PROCEDURE — 99213 OFFICE O/P EST LOW 20 MIN: CPT | Performed by: FAMILY MEDICINE

## 2025-05-23 RX ORDER — POLYETHYLENE GLYCOL 3350 17 G/17G
1 POWDER, FOR SOLUTION ORAL 2 TIMES DAILY PRN
Qty: 510 G | Refills: 0 | Status: SHIPPED | OUTPATIENT
Start: 2025-05-23

## 2025-05-23 ASSESSMENT — PAIN SCALES - GENERAL: PAINLEVEL_OUTOF10: MILD PAIN (1)

## 2025-06-01 ENCOUNTER — HEALTH MAINTENANCE LETTER (OUTPATIENT)
Age: 70
End: 2025-06-01

## 2025-06-23 DIAGNOSIS — M81.0 AGE-RELATED OSTEOPOROSIS WITHOUT CURRENT PATHOLOGICAL FRACTURE: ICD-10-CM

## 2025-06-23 RX ORDER — IBANDRONATE SODIUM 150 MG/1
TABLET, FILM COATED ORAL
Qty: 3 TABLET | Refills: 3 | Status: SHIPPED | OUTPATIENT
Start: 2025-06-23

## 2025-06-23 NOTE — TELEPHONE ENCOUNTER
IBANDRONATE SODIUM 150MG TABLET       Last Written Prescription Date:  4/5/24  Last Fill Quantity: 3 tabs,   # refills: 3  Last Office Visit: 5/23/25  Future Office visit:       Routing refill request to provider for review/approval because:    Bisphosphonates [id number]/23/2025 09:51 AM   Protocol Details   Most recent Creatinine Clearance in last 12 months >35        Creatinine   Date Value Ref Range Status   02/06/2024 0.70 0.51 - 0.95 mg/dL Final   03/09/2021 0.69 0.52 - 1.04 mg/dL Final

## 2025-07-13 ENCOUNTER — HEALTH MAINTENANCE LETTER (OUTPATIENT)
Age: 70
End: 2025-07-13

## 2025-07-21 DIAGNOSIS — M54.41 CHRONIC BILATERAL LOW BACK PAIN WITH RIGHT-SIDED SCIATICA: ICD-10-CM

## 2025-07-21 DIAGNOSIS — M54.31 SCIATICA OF RIGHT SIDE: ICD-10-CM

## 2025-07-21 DIAGNOSIS — G89.29 CHRONIC BILATERAL LOW BACK PAIN WITH RIGHT-SIDED SCIATICA: ICD-10-CM

## 2025-07-21 RX ORDER — GABAPENTIN 100 MG/1
CAPSULE ORAL
Qty: 360 CAPSULE | Refills: 1 | Status: SHIPPED | OUTPATIENT
Start: 2025-07-21

## 2025-07-21 NOTE — TELEPHONE ENCOUNTER
GABAPENTIN 100MG CAPSULE     Last Written Prescription Date:  10/8/24  Last Fill Quantity: 360,   # refills: 1  Last Office Visit: 5/23/25  Future Office visit:       Routing refill request to provider for review/approval because:  Drug not on the G, P or [facility] refill protocol or controlled substance

## 2025-08-01 ENCOUNTER — ANCILLARY PROCEDURE (OUTPATIENT)
Dept: MAMMOGRAPHY | Facility: OTHER | Age: 70
End: 2025-08-01
Attending: FAMILY MEDICINE
Payer: MEDICARE

## 2025-08-01 DIAGNOSIS — Z12.31 VISIT FOR SCREENING MAMMOGRAM: ICD-10-CM

## 2025-08-01 PROCEDURE — 77063 BREAST TOMOSYNTHESIS BI: CPT | Mod: 26 | Performed by: RADIOLOGY

## 2025-08-01 PROCEDURE — 77067 SCR MAMMO BI INCL CAD: CPT | Mod: 26 | Performed by: RADIOLOGY

## 2025-08-01 PROCEDURE — 77063 BREAST TOMOSYNTHESIS BI: CPT | Mod: TC

## 2025-08-04 ENCOUNTER — TELEPHONE (OUTPATIENT)
Dept: FAMILY MEDICINE | Facility: OTHER | Age: 70
End: 2025-08-04

## 2025-08-04 DIAGNOSIS — M18.0 ARTHRITIS OF CARPOMETACARPAL (CMC) JOINTS OF BOTH THUMBS: Primary | ICD-10-CM

## 2025-08-11 DIAGNOSIS — G89.29 CHRONIC BILATERAL LOW BACK PAIN WITH RIGHT-SIDED SCIATICA: ICD-10-CM

## 2025-08-11 DIAGNOSIS — M54.41 CHRONIC BILATERAL LOW BACK PAIN WITH RIGHT-SIDED SCIATICA: ICD-10-CM

## 2025-08-11 RX ORDER — IBUPROFEN 800 MG/1
800 TABLET, FILM COATED ORAL
Qty: 90 TABLET | Refills: 1 | Status: SHIPPED | OUTPATIENT
Start: 2025-08-11

## 2025-08-13 ENCOUNTER — TELEPHONE (OUTPATIENT)
Dept: FAMILY MEDICINE | Facility: OTHER | Age: 70
End: 2025-08-13

## (undated) DEVICE — FORCEP-COLON BIOPSY STD W/NEEDLE 160CM

## (undated) DEVICE — IRRIGATION-H2O 1000ML

## (undated) DEVICE — SENSOR-OXISENSOR II ADULT

## (undated) DEVICE — CONNECTOR-ERBEFLO 2 PORT

## (undated) DEVICE — TUBING-SUCTION 20FT

## (undated) DEVICE — MOUTHPIECE W/GUARD FOR ENDOSCOPY

## (undated) DEVICE — LUBRICANT JELLY 2OZ. TUBE

## (undated) DEVICE — SYRINGE-30CC SLIP TIP

## (undated) RX ORDER — LIDOCAINE HYDROCHLORIDE 20 MG/ML
INJECTION, SOLUTION EPIDURAL; INFILTRATION; INTRACAUDAL; PERINEURAL
Status: DISPENSED
Start: 2019-02-21

## (undated) RX ORDER — PROPOFOL 10 MG/ML
INJECTION, EMULSION INTRAVENOUS
Status: DISPENSED
Start: 2019-02-21

## (undated) RX ORDER — LIDOCAINE HYDROCHLORIDE 20 MG/ML
INJECTION, SOLUTION EPIDURAL; INFILTRATION; INTRACAUDAL; PERINEURAL
Status: DISPENSED
Start: 2022-09-29

## (undated) RX ORDER — PROPOFOL 10 MG/ML
INJECTION, EMULSION INTRAVENOUS
Status: DISPENSED
Start: 2022-09-29

## (undated) RX ORDER — FENTANYL CITRATE 50 UG/ML
INJECTION, SOLUTION INTRAMUSCULAR; INTRAVENOUS
Status: DISPENSED
Start: 2019-02-21